# Patient Record
Sex: MALE | URBAN - METROPOLITAN AREA
[De-identification: names, ages, dates, MRNs, and addresses within clinical notes are randomized per-mention and may not be internally consistent; named-entity substitution may affect disease eponyms.]

---

## 2024-07-11 ENCOUNTER — TELEPHONE (OUTPATIENT)
Dept: TRANSPLANT | Facility: CLINIC | Age: 29
End: 2024-07-11

## 2024-07-15 ENCOUNTER — TELEPHONE (OUTPATIENT)
Dept: TRANSPLANT | Facility: CLINIC | Age: 29
End: 2024-07-15

## 2024-08-05 ENCOUNTER — TELEPHONE (OUTPATIENT)
Dept: TRANSPLANT | Facility: CLINIC | Age: 29
End: 2024-08-05
Payer: MEDICARE

## 2024-08-06 DIAGNOSIS — N18.6 END STAGE RENAL DISEASE: Primary | ICD-10-CM

## 2024-08-06 DIAGNOSIS — Z76.82 ORGAN TRANSPLANT CANDIDATE: ICD-10-CM

## 2024-08-15 ENCOUNTER — TELEPHONE (OUTPATIENT)
Dept: TRANSPLANT | Facility: CLINIC | Age: 29
End: 2024-08-15
Payer: MEDICAID

## 2024-08-20 ENCOUNTER — CLINICAL SUPPORT (OUTPATIENT)
Dept: INFECTIOUS DISEASES | Facility: CLINIC | Age: 29
End: 2024-08-20
Payer: MEDICARE

## 2024-08-20 ENCOUNTER — HOSPITAL ENCOUNTER (OUTPATIENT)
Dept: RADIOLOGY | Facility: HOSPITAL | Age: 29
Discharge: HOME | End: 2024-08-20
Attending: NURSE PRACTITIONER
Payer: MEDICARE

## 2024-08-20 ENCOUNTER — OFFICE VISIT (OUTPATIENT)
Dept: TRANSPLANT | Facility: CLINIC | Age: 29
End: 2024-08-20
Payer: MEDICARE

## 2024-08-20 ENCOUNTER — TELEPHONE (OUTPATIENT)
Dept: TRANSPLANT | Facility: CLINIC | Age: 29
End: 2024-08-20
Payer: MEDICARE

## 2024-08-20 VITALS
TEMPERATURE: 97 F | HEART RATE: 75 BPM | DIASTOLIC BLOOD PRESSURE: 104 MMHG | OXYGEN SATURATION: 99 % | WEIGHT: 164 LBS | HEIGHT: 71 IN | SYSTOLIC BLOOD PRESSURE: 158 MMHG | BODY MASS INDEX: 22.96 KG/M2 | RESPIRATION RATE: 18 BRPM

## 2024-08-20 DIAGNOSIS — Z01.818 PRE-TRANSPLANT EVALUATION FOR KIDNEY TRANSPLANT: Primary | ICD-10-CM

## 2024-08-20 DIAGNOSIS — K75.81 NASH (NONALCOHOLIC STEATOHEPATITIS): Primary | Chronic | ICD-10-CM

## 2024-08-20 DIAGNOSIS — N25.81 SECONDARY HYPERPARATHYROIDISM: ICD-10-CM

## 2024-08-20 DIAGNOSIS — K75.81 NASH (NONALCOHOLIC STEATOHEPATITIS): Chronic | ICD-10-CM

## 2024-08-20 DIAGNOSIS — I10 ESSENTIAL HYPERTENSION: Chronic | ICD-10-CM

## 2024-08-20 DIAGNOSIS — N18.6 ESRD ON PERITONEAL DIALYSIS: ICD-10-CM

## 2024-08-20 DIAGNOSIS — Z76.82 ORGAN TRANSPLANT CANDIDATE: ICD-10-CM

## 2024-08-20 DIAGNOSIS — Z99.2 ESRD ON PERITONEAL DIALYSIS: ICD-10-CM

## 2024-08-20 PROBLEM — E55.9 VITAMIN D DEFICIENCY: Status: ACTIVE | Noted: 2023-12-31

## 2024-08-20 PROBLEM — N18.9 ANEMIA IN CHRONIC KIDNEY DISEASE: Status: ACTIVE | Noted: 2024-06-14

## 2024-08-20 PROBLEM — D63.1 ANEMIA IN CHRONIC KIDNEY DISEASE: Status: ACTIVE | Noted: 2024-06-14

## 2024-08-20 PROCEDURE — 99999 PR PBB SHADOW E&M-EST. PATIENT-LVL IV: CPT | Mod: PBBFAC,TXP,, | Performed by: NURSE PRACTITIONER

## 2024-08-20 PROCEDURE — 90632 HEPA VACCINE ADULT IM: CPT | Mod: S$GLB,TXP,, | Performed by: INTERNAL MEDICINE

## 2024-08-20 PROCEDURE — 76770 US EXAM ABDO BACK WALL COMP: CPT | Mod: TC,TXP

## 2024-08-20 PROCEDURE — 71046 X-RAY EXAM CHEST 2 VIEWS: CPT | Mod: 26,TXP,, | Performed by: RADIOLOGY

## 2024-08-20 PROCEDURE — 90471 IMMUNIZATION ADMIN: CPT | Mod: S$GLB,TXP,, | Performed by: INTERNAL MEDICINE

## 2024-08-20 PROCEDURE — 71046 X-RAY EXAM CHEST 2 VIEWS: CPT | Mod: TC,TXP

## 2024-08-20 PROCEDURE — 99203 OFFICE O/P NEW LOW 30 MIN: CPT | Mod: 25,S$GLB,TXP, | Performed by: INTERNAL MEDICINE

## 2024-08-20 PROCEDURE — 76770 US EXAM ABDO BACK WALL COMP: CPT | Mod: 26,TXP,, | Performed by: RADIOLOGY

## 2024-08-20 PROCEDURE — 99203 OFFICE O/P NEW LOW 30 MIN: CPT | Mod: S$GLB,TXP,, | Performed by: TRANSPLANT SURGERY

## 2024-08-20 RX ORDER — LABETALOL 200 MG/1
400 TABLET, FILM COATED ORAL 2 TIMES DAILY
COMMUNITY

## 2024-08-20 RX ORDER — PANTOPRAZOLE SODIUM 40 MG/1
40 TABLET, DELAYED RELEASE ORAL DAILY
COMMUNITY

## 2024-08-20 RX ORDER — GENTAMICIN SULFATE 1 MG/G
1 CREAM TOPICAL DAILY
COMMUNITY

## 2024-08-20 RX ORDER — CALCIUM ACETATE 667 MG/1
1334 CAPSULE ORAL
COMMUNITY

## 2024-08-20 RX ORDER — CYCLOBENZAPRINE HCL 10 MG
10 TABLET ORAL 3 TIMES DAILY PRN
COMMUNITY

## 2024-08-20 RX ORDER — HYDRALAZINE HYDROCHLORIDE 25 MG/1
100 TABLET, FILM COATED ORAL EVERY 8 HOURS
COMMUNITY

## 2024-08-20 RX ORDER — NIFEDIPINE 90 MG/1
90 TABLET, EXTENDED RELEASE ORAL DAILY
COMMUNITY

## 2024-08-20 RX ORDER — FUROSEMIDE 80 MG/1
80 TABLET ORAL 2 TIMES DAILY
COMMUNITY

## 2024-08-20 RX ORDER — LOSARTAN POTASSIUM 50 MG/1
50 TABLET ORAL DAILY
COMMUNITY

## 2024-08-20 NOTE — PROGRESS NOTES
Transplant Nephrology  Kidney Transplant Recipient Evaluation    Referring Physician: Raj Florez  Current Nephrologist: Raj Florez    Subjective:   CC:  Initial evaluation of kidney transplant candidacy.    HPI:  Mr. Moreno is a 29 y.o. year old Black or  male who has presented to be evaluated as a potential kidney transplant recipient.  He has ESRD secondary to presumed HTN, no renal biopsy.  Initially started on HD 12/31/2023 for about 3 months before transitioning to home peritoneal dialysis. Currently doing cyclical PD, no peritonitis or exit site infections.  He has a PD catheter and permacath (planning for removal) for dialysis access.    Diagnosed with HTN around age 17. Hospitalized December 2022 with n/v and found to have hypertensive urgency and creatinine at the time was 2.6. No follow up after this until he presented again in December 2023 for similar complaints and found to have creatinine 9 with uremic symptoms. Ultimately initiated dialysis during this hospital stay. No renal biopsy, no family history of kidney disease. Does not recall ever having significant protein in his urine. He does believe he had a procedure done on his prostate in the past and was treated with antibiotics. Reports no problems passing urine.     Elevated AST/ALT on several labs, US abdomen notes mildly increased hepatic echogenicity and coarsened echotexture as seen with diffuse hepatic steatosis and/or chronic liver disease. He has never seen GI or hepatology.    He remains active. Looks great, not frail. Has several family members interested in living donation.    Denies MI, CVA, DVT/PE, or malignancy history.    Current Outpatient Medications   Medication Sig Dispense Refill    calcium acetate,phosphat bind, (PHOSLO) 667 mg capsule Take 1,334 mg by mouth 3 (three) times daily with meals.      cyclobenzaprine (FLEXERIL) 10 MG tablet Take 10 mg by mouth 3 (three) times daily as needed for Muscle  spasms.      furosemide (LASIX) 80 MG tablet Take 80 mg by mouth 2 (two) times daily.      gentamicin (GARAMYCIN) 0.1 % cream Apply 1 Application topically once daily.      hydrALAZINE (APRESOLINE) 25 MG tablet Take 100 mg by mouth every 8 (eight) hours.      labetaloL (NORMODYNE) 200 MG tablet Take 400 mg by mouth 2 (two) times daily.      losartan (COZAAR) 50 MG tablet Take 50 mg by mouth once daily.      NIFEdipine (ADALAT CC) 90 MG TbSR Take 90 mg by mouth once daily.      pantoprazole (PROTONIX) 40 MG tablet Take 40 mg by mouth once daily.       No current facility-administered medications for this visit.       Past Medical History:   Diagnosis Date    Anemia     Disorder of kidney and ureter     Enteritis     Hypertension     LVH (left ventricular hypertrophy)     ZAVALETA (nonalcoholic steatohepatitis)     Prostatitis     Secondary hyperparathyroidism        Past Medical and Surgical History: Mr. Moreno  has a past medical history of Anemia, Disorder of kidney and ureter, Enteritis, Hypertension, LVH (left ventricular hypertrophy), ZAVALETA (nonalcoholic steatohepatitis), Prostatitis, and Secondary hyperparathyroidism.  He has a past surgical history that includes Insertion of dialysis catheter and Peritoneal catheter insertion.    Past Social and Family History: Mr. Moreno reports that he has never smoked. He has never used smokeless tobacco. He reports that he does not currently use alcohol. He reports that he does not use drugs. His family history includes Heart disease in his sister; Hypertension in his maternal grandmother and mother.    Review of Systems   Constitutional:  Positive for fatigue. Negative for activity change and fever.   Eyes:  Negative for visual disturbance.   Respiratory:  Negative for cough and shortness of breath.    Cardiovascular:  Negative for chest pain and leg swelling.   Gastrointestinal:  Negative for abdominal pain, constipation, diarrhea and nausea.   Genitourinary:  Positive for  "decreased urine volume. Negative for difficulty urinating, frequency and hematuria.        Still urinating    Musculoskeletal:  Negative for arthralgias and myalgias.   Skin:  Negative for wound.   Neurological:  Negative for weakness.   Psychiatric/Behavioral:  Negative for sleep disturbance.        Objective:   Blood pressure (!) 158/104, pulse 75, temperature 96.8 °F (36 °C), temperature source Tympanic, resp. rate 18, height 5' 11.5" (1.816 m), weight 74.4 kg (164 lb 0.4 oz), SpO2 99%.body mass index is 22.56 kg/m².    Physical Exam  Vitals and nursing note reviewed.   Constitutional:       Appearance: Normal appearance.   Cardiovascular:      Rate and Rhythm: Normal rate and regular rhythm.      Heart sounds: Normal heart sounds.      Comments: permacath  Pulmonary:      Effort: Pulmonary effort is normal.      Breath sounds: Normal breath sounds.   Abdominal:      General: There is no distension.      Comments: PD catheter    Musculoskeletal:         General: Normal range of motion.   Skin:     General: Skin is warm and dry.   Neurological:      Mental Status: He is alert.         Labs:  Lab Results   Component Value Date    WBC 4.80 08/20/2024    HGB 10.1 (L) 08/20/2024    HCT 30.0 (L) 08/20/2024     08/20/2024    K 3.9 08/20/2024     08/20/2024    CO2 22 (L) 08/20/2024    BUN 68 (H) 08/20/2024    CREATININE 19.5 (H) 08/20/2024    EGFRNORACEVR 3.0 (A) 08/20/2024    CALCIUM 10.1 08/20/2024    PHOS 8.1 (H) 08/20/2024    ALBUMIN 3.6 08/20/2024    AST 32 08/20/2024    ALT 41 08/20/2024    .6 (H) 08/20/2024       Lab Results   Component Value Date    LIPASE 44 05/02/2024    PROTEINUA 3+ (A) 01/02/2024    WBCUA 0-5 (A) 01/02/2024       Labs were reviewed with the patient.    Assessment:     1. Pre-transplant evaluation for kidney transplant    2. ESRD on peritoneal dialysis    3. Essential hypertension    4. ZAVALETA (nonalcoholic steatohepatitis)    5. Secondary hyperparathyroidism    6. BMI " 22.0-22.9, adult      Plan:   29 y.o. year old  male who has presented to be evaluated as a potential kidney transplant recipient.  He has ESRD secondary to presumed HTN, no renal biopsy.  Initially started on HD 12/31/2023 for about 3 months before transitioning to home peritoneal dialysis. Will need afternoon imaging, stress test, and hepatology evaluation prior to selection. OK to start donor work up.      Transplant Candidacy:   Based on available information, Mr. Moreno is an excellent kidney transplant candidate.   Meets center eligibility for accepting HCV+ donor offer - Yes.  Patient educated on HCV+ donors. Jeffrey is willing to accept HCV+ donor offer - Yes   Patient is a candidate for KDPI > 85 kidney donor offer - No (age).  Final determination of transplant candidacy will be made once workup is complete and reviewed by the selection committee.    Patient advised that it is recommended that all transplant candidates, and their close contacts and household members receive Covid vaccination.    UNOS Patient Status  Functional Status: 90% - Able to carry on normal activity: minor symptoms of disease      Yola Brasher NP

## 2024-08-20 NOTE — PROGRESS NOTES
INITIAL PATIENT EDUCATION NOTE AA    Mr. Jeffrey Moreno was seen in pre-kidney transplant clinic for evaluation for kidney, kidney/pancreas or pancreas only transplant.  The patient attended an individual video education session that discussed/reviewed the following aspects of transplantation: evaluation including diagnostic and laboratory testing,(Chemistries, Hematology, Serologies including HIV and Hepatitis and HLA) required for transplantation and selection committee process, UNOS waitlist management/multiple listings, types of organs offered (KDPI < 85%, KDPI > 85%, PHS risk, DCD, HCV+, HIV+ for HIV+ recipients and enbloc/dual), financial aspects, surgical procedures, dietary instruction pre- and post-transplant, health maintenance pre- and post-transplant, post-transplant hospitalization and outpatient follow-up, potential to participate in a research protocol, and medication management and side effects.  A question and answer session was provided after the presentation.    The patient was seen by all members of the multi-disciplinary team to include: Nephrologist/RON, Surgeon, , Transplant Coordinator, , Pharmacist and Dietician (if applicable).    The patient reviewed and signed all consents for evaluation which were witnessed and sent to scanning into the [facility] chart.    The patient was given an education book and plan for further evaluation based on his individual assessment.      The Patient was educated on OPTN policy change regarding race based eGFR. For Black or  Americans, this eGFR could have shown that their kidneys were working better than they were.    Because of this change, we are looking at everyones record and assessing waiting time for people who are eligible. We will be reviewing your medical records and will notify you if you are eligible. We also encouraged patient to provide span 20 labs that are not in our electronic medical records    Reviewed  program requirement for complete COVID vaccination with documentation prior to listing.  COVID education information reviewed with patient. Patient encouraged to be up to date on all vaccinations.     The patient was informed that the transplant team would manage immediate post op pain. If the patient requires long term pain management, they will need to have that pain management addressed by their PCP or previous provider who wrote for long term pain medicines.    The patient was encouraged to call with any questions or concerns.

## 2024-08-20 NOTE — PROGRESS NOTES
PRE-TRANSPLANT INFECTIOUS DISEASE CONSULT    Reason for Visit:  Pre-transplant evaluation  Referring Provider: Dr. Raj Florez     History of Present Illness:    29 y.o. male with a history of HTN, ZAVALETA, ESRD on PD  presents for pre-kidney transplant evaluation.    Pt started PD about 2-3 months ago. Pt states he started on HD in Jan 2024. Pt has a chest catheter which is being removed on Thursday. Pt denies cf infection near PD cath site.     Infectious History:  Recent hospital admissions: Yes, pt recently diagnosis with Cdiff and various stomach infections (prior to PD cath being placed).   Recent infections: Yes, pt reports recent cdiff infection.   Recent or current antibiotic use: Yes, flagyl and cipro  History of recurrent infections *(sinus / pneumonia / UTI / SBP)*: no, though was diagnosised with pneumonia when he was treated inpatient for cdiff/enteritis   History of diabetic foot wound or bone/joint infection: No  Recent dental infections, issues or procedures: No  History of chicken pox: No  History of shingles: No  History of STI: No  History of COVID infection: Yes    History of Immunosuppression:  Prior chemotherapy / immunosuppression: No  Prior transplant: No  History of splenectomy: No    Tuberculosis:  Prior screening for latent TB: No  Prior diagnosis of latent TB: No  Risk factors for TB *known exposure, incarceration, homelessness*: No    Geographical exposures:  Currently lives in [place], [place] with mom  Lived in the following states: [place], [place]   Lived or travelled to the [facility] [place]: No  International travel: No  Travel-associated illness: No    Social/Environmental:  Occupation:  NA  Pets: Yes, dogs, cats (outdoor).   Livestock: Yes, horse.   Fishing / hunting: Yes, likes to fish   Hobbies: gardening, hiking   Water: [place]  Consumption of raw/undercooked meat or seafood?  No  Tobacco: No  Alcohol: No  Recreational drug use:  No  Sexual partners: yes, monogamous        Past Histories:   Past Medical History:   Diagnosis Date    Anemia     Disorder of kidney and ureter     Enteritis     Hypertension     LVH (left ventricular hypertrophy)     ZAVALETA (nonalcoholic steatohepatitis)     Secondary hyperparathyroidism      Past Surgical History:   Procedure Laterality Date    INSERTION OF DIALYSIS CATHETER      PERITONEAL CATHETER INSERTION       Family History   Problem Relation Name Age of Onset    Hypertension Mother      Heart disease Sister      Hypertension Maternal Grandmother       Social History     Tobacco Use    Smoking status: Never    Smokeless tobacco: Never   Substance Use Topics    Alcohol use: Not Currently    Drug use: Never     Review of patient's allergies indicates:  No Known Allergies      Current antibiotics:  Antibiotics (From admission, onward)      None              Review of Systems  Review of Systems   Constitutional: Negative for chills, fever, night sweats, weight gain and weight loss.   Respiratory:  Negative for cough and hemoptysis.    Skin:  Negative for poor wound healing, rash and suspicious lesions.   Gastrointestinal:  Negative for diarrhea, nausea and vomiting.   Genitourinary:  Negative for dysuria.          Objective  Physical Exam  Vitals reviewed.   Constitutional:       General: He is not in acute distress.     Appearance: Normal appearance. He is not ill-appearing.   HENT:      Head: Normocephalic.      Nose: Nose normal.      Mouth/Throat:      Mouth: Mucous membranes are moist.      Pharynx: Oropharynx is clear.   Eyes:      Conjunctiva/sclera: Conjunctivae normal.   Cardiovascular:      Rate and Rhythm: Normal rate.   Pulmonary:      Effort: Pulmonary effort is normal. No respiratory distress.      Breath sounds: No stridor.   Abdominal:      General: Abdomen is flat. There is no distension.      Palpations: Abdomen is soft.   Musculoskeletal:         General: Normal range of motion.      Cervical back: Normal range of motion.       "Right lower leg: No edema.      Left lower leg: No edema.   Skin:     Findings: No erythema or rash.   Neurological:      General: No focal deficit present.      Mental Status: He is alert and oriented to person, place, and time.      Motor: No weakness.      Gait: Gait normal.   Psychiatric:         Mood and Affect: Mood normal.         Behavior: Behavior normal.           Labs:    CBC:   Lab Results   Component Value Date    WBC 4.80 08/20/2024    HGB 10.1 (L) 08/20/2024    HCT 30.0 (L) 08/20/2024    MCV 87 08/20/2024     08/20/2024    GRAN 2.9 08/20/2024    GRAN 61.0 08/20/2024    LYMPH 1.4 08/20/2024    LYMPH 28.8 08/20/2024    MONO 0.3 08/20/2024    MONO 6.3 08/20/2024    EOSINOPHIL 2.7 08/20/2024       Syphilis screening:   Lab Results   Component Value Date    TREPABIGMIGG Nonreactive 08/20/2024        TB screening: No results found for: "TBGOLDPLUS", "TSPOTSCREN"    HIV screening:   Lab Results   Component Value Date    [id number] Non-reactive 08/20/2024       Strongyloides IgG: No results found for: "STRONGANTIGG"    Hepatitis Serologies:   Lab Results   Component Value Date    HEPAIGG Non-reactive 08/20/2024    HEPBCAB Non-reactive 08/20/2024    HEPBSAB >1000.00 08/20/2024    HEPBSAB Reactive 08/20/2024    HEPCAB Non-reactive 08/20/2024        Varicella IgG: No results found for: "VARICELLAINT"        There is no immunization history on file for this patient.     Immunization History:  Received all childhood vaccines: Yes  All household members receive annual flu vaccine: No  All household members are up to date on COVID vaccine: No    Assessment and Plan    1. Risks of Infection: Available serologies were reviewed. No unusual risks of infection or significant barriers to transplantation were identified from the infectious disease standpoint given the information available at this time.    - Acute infectious issues: None   - Pending serologies: Strongyloides IgG and VZV IgG   - Please call if any " pending serologic testing is positive.    2. Immunizations:  Based on the patient's immunization history and serologies, the following immunizations are recommended:  - Hepatitis A    Patient does not have immunity to hepatitis A    Vaccination ordered today: Yes   - Hepatitis B    Patient does have immunity to hepatitis B    Vaccination ordered today: No. Reason for not ordering: Immunity   - COVID    Current [facility] vaccination recommendations were discussed with the patient   - Annual high dose influenza     Vaccination ordered today: Yes   - Prevnar 20    Vaccination ordered today: Yes   - Tdap    Vaccination ordered today: Yes   - Shingrix    Vaccination ordered today: Yes    Recommended Pre-Transplant Immunization Schedule   Vaccine  0m 1m 2m 6m   Pneumococcal conjugate vaccine (Prevnar 20) X      Tetanus-diphtheria-pertussis (Tdap)* X      Hepatitis A Vaccine (Havrix)** X   X   Hepatitis B Vaccine (Heplisav)** X X     Influenza (annual) X      Zoster Recombinant Vaccine (Shingrix) X  X           *Administer booster every 10 years.       **Administer if no immunity demonstrated on serologies               Patient will receive vaccines today in ID clinic, and will be scheduled for all subsequent doses to be given in ID clinic.    3. Counseling:   I discussed with the patient the risk for increased susceptibility to infections following transplantation including increased risk for infection right after transplant and if rejection should occur.  The patient has been counseled on the importance of vaccinations to decrease risk of infection and severe illness. Specific guidance has been provided to the patient regarding the patient's occupation, hobbies and activities to avoid future infectious complications.     4. Transplant Candidacy: Based on available information, there are no identified significant barriers to transplantation from an infectious disease standpoint.  Final determination of transplant candidacy will  be made once evaluation is complete and reviewed by the Selection Committee.      Follow up with infectious disease as needed.       The total time for evaluation and management services performed on 08/20/2024 was greater than 30 minutes.

## 2024-08-20 NOTE — TELEPHONE ENCOUNTER
Reviewed pt transplant labs.  Notified dialysis unit dietitian of the following abnormal labs via fax and requested their most recent nutrition note on this pt.  Once this note is received it will be scanned into pt's chart.     Phos 8.1

## 2024-08-20 NOTE — PROGRESS NOTES
Transplant Surgery  Kidney Transplant Recipient Evaluation    Referring Physician: Raj Florez  Current Nephrologist: Raj Florez    Subjective:     Reason for Visit: evaluate transplant candidacy    History of Present Illness: Jeffrey Moreno is a 29 y.o. year old male undergoing transplant evaluation.    Dialysis History: Jeffrey is on peritoneal dialysis.      Transplant History: N/A    Etiology of Renal Disease: Hypertensive Nephrosclerosis (based on medical records from referral).    External provider notes reviewed: Yes    Review of Systems  Objective:     Physical Exam:  Constitutional:   Vitals reviewed: yes   Well-nourished and well-groomed: yes  Eyes:   Sclerae icteric: no   Extraocular movements intact: yes  GI:    Bowel sounds normal: yes   Tenderness: no    If yes, quadrant/location: not applicable   Palpable masses: no    If yes, quadrant/location: not applicable   Hepatosplenomegaly: no   Ascites: no   Hernia: no    If yes, type/location: not applicable   Surgical scars: yes    If yes, type/location: PD catheter  Resp:   Effort normal: yes   Breath sounds normal: yes    CV:   Regular rate and rhythm: yes   Heart sounds normal: yes   Femoral pulses normal: yes   Extremities edematous: no  Skin:   Rashes or lesions present: no    If yes, describe:not applicable   Jaundice:: no    Musculoskeletal:   Gait normal: yes   Strength normal: yes  Psych:   Oriented to person, place, and time: yes   Affect and mood normal: yes    Additional comments: not applicable    Diagnostics:  The following labs have been reviewed: NA  The following radiology images have been independently reviewed and interpreted: NA    Counseling: We provided Jeffrey Moreno with a group education session today.  We discussed kidney transplantation at length with him, including risks, potential complications, and alternatives in the management of his renal failure.  The discussion included complications related to anesthesia, bleeding,  infection, primary nonfunction, and ATN.  I discussed the typical postoperative course, length of hospitalization, the need for long-term immunosuppression, and the need for long-term routine follow-up.  I discussed living-donor and -donor transplantation and the relative advantages and disadvantages of each.  I also discussed average waiting times for both living donation and  donation.  I discussed national and center-specific survival rates.  I also mentioned the potential benefit of multicenter listing to candidates listed with centers within more than one organ procurement organization.  All questions were answered.    Patient advised that it is recommended that all transplant candidates, and their close contacts and household members receive Covid vaccination.    Final determination of transplant candidacy will be made once evaluation is complete and reviewed by the Kidney & Kidney/Pancreas Selection Committee.    Coronavirus disease (COVID-19) caused by severe acute respiratory virus coronavirus 2 (SARS-C0V 2) is associated with increased mortality in solid organ transplant recipients (SOT) compared to non-transplant patients. Vaccine responses to vaccination are depressed against SARS-CoV2 compared to normal individuals but improve with third vaccination doses. Vaccination prior to SOT provides both the best opportunity for transplant candidates to develop protective immunity and to reduce the risk of serious COVID19 infections post transplantation. Organ transplant candidates at [facility] will be required to receive SARS-CoV-2 vaccination prior to being listed with a an active status, whenever possible. Exceptions will be made for disability related reasons or for sincerely held Church beliefs.          Transplant Surgery - Candidacy   Assessment/Plan:   Jeffrey Moreno has end stage renal disease (ESRD) on dialysis. I see no surgical contraindication  to placing a kidney transplant. Based on available information, Jeffrey Moreno is a suitable kidney transplant candidate.     Additional testing to be completed according to the Written Order Guidelines for Adult Pre-kidney and Pancreas Transplant Evaluation (KI-02).  Interpretation of tests and discussion of patient management involves all members of the multidisciplinary transplant team.    Julius Hutchison MD

## 2024-08-20 NOTE — PROGRESS NOTES
PHARM.D. PRE-TRANSPLANT NOTE:    This patient's medication therapy was evaluated as part of his pre-transplant evaluation.      The following general pharmacologic concerns were noted: None    The following concerns for post-operative pain management were noted: Patient currently on cyclobenzaprine    The following pharmacologic concerns related to HCV therapy were noted: None      This patient's medication profile was reviewed for considerations for DAA Hepatitis C therapy:    [X]  No current inducers of CYP 3A4 or PGP  [X]  No amiodarone on this patient's EMR profile in the last 24 months  [X]  No past or current atrial fibrillation on this patient's EMR profile       Current Outpatient Medications   Medication Sig Dispense Refill    calcium acetate,phosphat bind, (PHOSLO) 667 mg capsule Take 1,334 mg by mouth 3 (three) times daily with meals.      cyclobenzaprine (FLEXERIL) 10 MG tablet Take 10 mg by mouth 3 (three) times daily as needed for Muscle spasms.      furosemide (LASIX) 80 MG tablet Take 80 mg by mouth 2 (two) times daily.      gentamicin (GARAMYCIN) 0.1 % cream Apply 1 Application topically once daily.      hydrALAZINE (APRESOLINE) 25 MG tablet Take 100 mg by mouth every 8 (eight) hours.      labetaloL (NORMODYNE) 200 MG tablet Take 400 mg by mouth 2 (two) times daily.      losartan (COZAAR) 50 MG tablet Take 50 mg by mouth once daily.      NIFEdipine (ADALAT CC) 90 MG TbSR Take 90 mg by mouth once daily.      pantoprazole (PROTONIX) 40 MG tablet Take 40 mg by mouth once daily.       No current facility-administered medications for this visit.           I am available for consultation and can be contacted, as needed by the other members of the Transplant team.

## 2024-08-20 NOTE — LETTER
August 21, 2024        Raj Florez  [address] [place] [address]  Phone: [phone]  Fax: [phone]             [facility]- Trans[facility] Fl  [address]  [facility] [address]  Phone: [phone]   Patient: Jeffrey Moreno   MR Number: [id number]   YOB: 1995   Date of Visit: 8/20/2024       Dear Dr. Raj Florez    Thank you for referring Jeffrey Moreno to me for evaluation. Attached you will find relevant portions of my assessment and plan of care.    If you have questions, please do not hesitate to call me. I look forward to following Jeffrey Moreno along with you.    Sincerely,    Yola Brasher NP    Enclosure    If you would like to receive this communication electronically, please contact [email or web address] or [phone] to request LIA Link access.    LIA Link is a tool which provides read-only access to select patient information with whom you have a relationship. Its easy to use and provides real time access to review your patients record including encounter summaries, notes, results, and demographic information.    If you feel you have received this communication in error or would no longer like to receive these types of communications, please e-mail [email or web address]

## 2024-08-26 NOTE — PROGRESS NOTES
Transplant Recipient Adult Psychosocial Assessment    Jeffrey Moreno  [address]  [place] [address]  Telephone Information:   Mobile [phone]   Home  [phone] (home)  Work  There is no work phone number on file.  E-mail  [email or web address]    Sex: male  YOB: 1995  Age: 29 y.o.    Encounter Date: 8/20/2024  U[place] Citizen: yes  Primary Language: English   Needed: no    Emergency Contact:  Name: Sarah Duffy  Relationship: mother  Address: same as pt   Phone Numbers: [phone] (mobile)    Family/Social Support:   Number of dependents/: Pt reports no minor dependents   Marital history: Pt reports no marital history  Other family dynamics: Pt presents with highly supportive mother Sarah. Pt resides with mother. Pt reports father is alive but they are currently estranged. Pt also reports having a highly supportive sister,girlfriend,friends and grandparents.     Household Composition:  Name: Sarah Duffy  Relationship: mother  Transportation: drives own vehicle   Phone Numbers: [phone] (mobile)    Do you and your caregivers have access to reliable transportation? yes  PRIMARY CAREGIVER: Sarah Duffy will be primary caregiver, phone number [phone].      provided in-depth information to patient and caregiver regarding pre- and post-transplant caregiver role.   strongly encourages patient and caregiver to have concrete plan regarding post-transplant care giving, including back-up caregiver(s) to ensure care giving needs are met as needed.    Patient and Caregiver states understanding all aspects of caregiver role/commitment and is able/willing/committed to being caregiver to the fullest extent necessary.    Patient and Caregiver verbalizes understanding of the education provided today and caregiver responsibilities.         remains available. Patient and Caregiver agree to contact  in a timely manner  if concerns arise.      Able to take time off work without financial concerns: yes.     Additional Significant Others who will Assist with Transplant:  Name: Dunia Duffy  Age: 70  Number: [phone]  City: [place] State: [place]  Relationship: grandmother  Does person drive? yes    Name: Brittanie  Age: 23  Number: [phone]  City: [place] State: [place]  Relationship: sister  Does person drive? yes    Living Will: no  Healthcare Power of : no Pt reports trusting mother with medical decisions as needed  Advance Directives on file: <<no information> per medical record.  Verbally reviewed LW/HCPA information.   provided patient with copy of LW/HCPA documents and provided education on completion of forms.    Living Donors: Yes.  Name: Nohemi, pt's sister expressed interest . Education and resource information given to patient.    Highest Education Level: High School (9-12) or GED  Reading Ability: 12th grade  Reports difficulty with: N/A  Learns Best By:  A combination of verbal, written, and hands-on instruction.       Status: no  VA Benefits: no     Working for Income: No  If no, reason not working: Disability  Patient is disabled.  Prior to disability, patient  was unemployed..    Spouse/Significant Other Employment: Pt's zoey Abernathy is also not employed    Disabled: yes: date disability began: , due to: ESRD.    Monthly Income:  Salary/Wages: $800  Food [place]: $96  Able to afford all costs now and if transplanted, including medications: yes  Patient and Caregiver verbalizes understanding of personal responsibilities related to transplant costs and the importance of having a financial plan to ensure that patients transplant costs are fully covered.       provided fundraising information/education. Patient and Caregiververbalizes understanding.   remains available.    Insurance:   Payer/Plan Subscr  Sex Relation Sub. Ins. ID Effective Group Num   1. [employer] MANAGE*  DARLINE SEWELL 1995 Male Self [id number] 5/1/24 [id number]                                   [address] [address]   2. [place] JOSE[place]* DARLINE SEWELL 1995 Male Self [id number] 7/1/24                                    [address] [address]     Primary Insurance (for UNOS reporting): Public Insurance - Medicare FFS (Fee For Service)  Secondary Insurance (for UNOS reporting): Public Insurance - Medicaid  Patient and Caregiver verbalizes clear understanding that patient may experience difficulty obtaining and/or be denied insurance coverage post-surgery. This includes and is not limited to disability insurance, life insurance, health insurance, burial insurance, long term care insurance, and other insurances.      Patient and Caregiver also reports understanding that future health concerns related to or unrelated to transplantation may not be covered by patient's insurance.  Resources and information provided and reviewed.     Patient and Caregiver provides verbal permission to release any necessary information to outside resources for patient care and discharge planning.  Resources and information provided are reviewed.      Dialysis Adherence: Patient reports as highly complaint with all PD recommendations. Adherence report requested.      Infusion Service: patient utilizing? no  Home Health: patient utilizing? no  DME: yes PD equipment   Pulmonary/Cardiac Rehab: pt denies   ADLS:  Pt reports pt is independent with all ADLS including driving, bathing,walking,taking medications, cooking, housekeeping, eating, and shopping.      Adherence:  Adherence education and counseling provided.     Per History Section:  Past Medical History:   Diagnosis Date    Anemia     Disorder of kidney and ureter     Enteritis     Hypertension     LVH (left ventricular hypertrophy)     ZAVALETA (nonalcoholic steatohepatitis)     Prostatitis     Secondary hyperparathyroidism      Social History     Tobacco Use    Smoking status: Never    Smokeless  tobacco: Never   Substance Use Topics    Alcohol use: Not Currently     Social History     Substance and Sexual Activity   Drug Use Never     Social History     Substance and Sexual Activity   Sexual Activity Not Currently       Per Today's Psychosocial:  Tobacco: none, patient denies any use.  Alcohol: none, patient denies any use.  Illicit Drugs/Non-prescribed Medications: none, patient denies any use.    Patient and Caregiver states clear understanding of the potential impact of substance use as it relates to transplant candidacy and is aware of possible random substance screening.  Substance abstinence/cessation counseling, education and resources provided and reviewed.     Arrests/DWI/Treatment/Rehab: patient denies    Psychiatric History:    Mental Health: Pt denies history of anxiety, depression and or overwhelming feelings of sadness at this time or in the past.   Psychiatrist/Counselor: Pt denies currently or in the past and reports willingness to meet with psychiatry if required by transplant.   Medications:  Pt denies utilizing mental health medications currently or in the past  Suicide/Homicide Issues: Pt denies feelings of wanting to harm self or other currently or in the past  Safety at home: Pt reports feeling safe at home.     Knowledge: Patient and Caregiver states having clear understanding and realistic expectations regarding the potential risks and potential benefits of organ transplantation and organ donation and agrees to discuss with health care team members and support system members, as well as to utilize available resources and express questions and/or concerns in order to further facilitate the pt informed decision-making.  Resources and information provided and reviewed.    Patient and Caregiver is aware of [facility]'s affiliation and/or partnership with agencies in home health care, LTAC, SNF, [facility], and other hospitals and clinics.    Understanding: Patient and Caregiver reports having a  clear understanding of the many lifetime commitments involved with being a transplant recipient, including costs, compliance, medications, lab work, procedures, appointments, concrete and financial planning, preparedness, timely and appropriate communication of concerns, abstinence (ETOH, tobacco, illicit non-prescribed drugs), adherence to all health care team recommendations, support system and caregiver involvement, appropriate and timely resource utilization and follow-through, mental health counseling as needed/recommended, and patient and caregiver responsibilities.  Social Service Handbook, resources and detailed educational information provided and reviewed.  Educational information provided.    Patient and Caregiver also reports current and expected compliance with health care regime and states having a clear understanding of the importance of compliance.      Patient and Caregiver reports a clear understanding that risks and benefits may be involved with organ transplantation and with organ donation.       Patient and Caregiver also reports clear understanding that psychosocial risk factors may affect patient, and include but are not limited to feelings of depression, generalized anxiety, anxiety regarding dependence on others, post traumatic stress disorder, feelings of guilt and other emotional and/or mental concerns, and/or exacerbation of existing mental health concerns.  Detailed resources provided and discussed.      Patient and Caregiver agrees to access appropriate resources in a timely manner as needed and/or as recommended, and to communicate concerns appropriately.  Patient and Caregiver also reports a clear understanding of treatment options available.     Patient and Caregiver received education in a group setting.   reviewed education, provided additional information, and answered questions.    Feelings or Concerns: Pt denies having any concerns and or overwhelming feelings  regarding transplant at this time.       Coping: Identify Patient & Caregiver Strategies to [place]:   1. In the past, coping with major surgery and/or related stress - enjoys his horse, dogs, cats,dahlia and family    2. Currently & Pre-transplant - enjoys his horse, dogs, cats and family    3. At the time of surgery - enjoys family and dahlia   4. During post-Transplant & Recovery Period - enjoys his horse, dogs, cats,dahlia and family     Goals: Pt reports post transplant goal of returning to work. Patient referred to Vocational Rehabilitation.    Interview Behavior: Patient and Caregiver presents as alert and oriented x 4, pleasant, good eye contact, well groomed, recall good, concentration/judgement good, average intelligence, calm, communicative, cooperative, and asking and answering questions appropriately. Pt and mother were highly engaged and motivated for transplant.         Transplant Social Work - Candidacy  Assessment/Plan:     Psychosocial Suitability: Patient presents as an acceptable candidate for transplant at this time. Based on psychosocial risk factors, patient presents as low risk, due to absence of overwhelming psychosocial concerns .    Recommendations/Additional Comments: SW recommends that pt conduct fundraising to assist pt with pay for cost of medications, food, gas, and other transplant related needs. SW recommends that pt remain aware of potential mental health concerns and contact the team if any concerns arise. SW recommends that pt remain abstinent from tobacco, ETOH, and drug use. SW supports pt's continued dialysis adherence. SW remains available to answer any questions or concerns that arise as the pt moves through the transplant process.     Deepti Wise, JUAN, LMSW

## 2024-08-28 ENCOUNTER — TELEPHONE (OUTPATIENT)
Dept: TRANSPLANT | Facility: CLINIC | Age: 29
End: 2024-08-28
Payer: MEDICARE

## 2024-08-28 NOTE — TELEPHONE ENCOUNTER
MA notes per Adherence form     FOR THE PAST THREE MONTHS:    0-AMA's  0-No-shows    No concerns with care giving, transportation, or mental health    Scanned in pt's media    Malorie Morgan  Abdominal Transplant MA

## 2024-08-29 ENCOUNTER — TELEPHONE (OUTPATIENT)
Dept: TRANSPLANT | Facility: CLINIC | Age: 29
End: 2024-08-29
Payer: MEDICARE

## 2024-09-05 ENCOUNTER — DOCUMENTATION ONLY (OUTPATIENT)
Dept: TRANSPLANT | Facility: CLINIC | Age: 29
End: 2024-09-05
Payer: MEDICARE

## 2024-09-25 ENCOUNTER — TELEPHONE (OUTPATIENT)
Dept: TRANSPLANT | Facility: CLINIC | Age: 29
End: 2024-09-25
Payer: MEDICARE

## 2024-09-25 NOTE — TELEPHONE ENCOUNTER
Returned call to pt, he wants to schedule stress test locally in [place].  Confirmed that outside testing letter has been sent to his local nephrologist/pcp, instructed him to call their office and they will get the stress test scheduled for him.  Pt verbalized understanding and will call Dr Florez office.

## 2024-09-25 NOTE — TELEPHONE ENCOUNTER
----- Message from Thalia Mendiola sent at 9/25/2024 12:43 PM CDT -----  Regarding: Stress Test  Contact: [phone]  Good afternoon,     Pt called requesting to schedule a Stress Test.  Pt is requesting a call back from clinical staff, in hopes to be scheduled.    Thank you,   Renate Antunez Navigator   Patient unable to complete

## 2024-09-26 ENCOUNTER — TELEPHONE (OUTPATIENT)
Dept: TRANSPLANT | Facility: CLINIC | Age: 29
End: 2024-09-26
Payer: MEDICARE

## 2024-09-30 ENCOUNTER — EPISODE CHANGES (OUTPATIENT)
Dept: TRANSPLANT | Facility: CLINIC | Age: 29
End: 2024-09-30

## 2024-10-01 ENCOUNTER — TELEPHONE (OUTPATIENT)
Dept: TRANSPLANT | Facility: CLINIC | Age: 29
End: 2024-10-01
Payer: MEDICARE

## 2024-10-01 NOTE — TELEPHONE ENCOUNTER
Lab results faxed to number below as requested.  ----- Message from Melly Quevedo sent at 9/26/2024  4:58 PM CDT -----  Regarding: FW: Consult/Advisory  Contact: Isabeljessee @ [phone]    ----- Message -----  From: Anthony Padilla RN  Sent: 9/26/2024  10:46 AM CDT  To: [facility] Pre-Kidney Transplant Clinical  Subject: FW: Consult/Advisory                               ----- Message -----  From: Génesis Ybarra  Sent: 9/25/2024   3:07 PM CDT  To: [facility] Post-Kidney Transplant Non-Clinical  Subject: Consult/Advisory                                 Consult/Advisory     Name Of Caller:Juan     Contact Preference: [phone]     Nature of call: nurse at [facility] is calling to speak about labs (LST) results that's needed. Fax number [phone]

## 2024-10-01 NOTE — LETTER
2024             To Whom It May Concern:  [facility]   Fax number [phone]     We received a request from Renate requesting lab results for the patient below, attached are the most recent results that we have.      RE: Jeffrey Sewell   MRN: [id number]   SEX male   : 1995     Insurance:   Payer/Plan Subscr  Sex Relation Sub. Ins. ID Effective Group Num   1. [facility]* JEFFREY SEWELL 1995 Male Self [id number]                                   [address] [address]   2. [place] M* JEFFREY SEWELL 1995 Male Self [id number]                                    [address] [address]       Sincerely,       Olive Chiu RN  Clinical Transplant Coordinator  [facility]  [address] [place] [address]  [phone] office  [phone] fax

## 2024-11-13 ENCOUNTER — TELEPHONE (OUTPATIENT)
Dept: TRANSPLANT | Facility: CLINIC | Age: 29
End: 2024-11-13
Payer: MEDICARE

## 2024-11-13 NOTE — TELEPHONE ENCOUNTER
----- Message from Med Assistant Stevens sent at 11/13/2024 12:29 PM CST -----  Regarding: FW: Questions    ----- Message -----  From: Adalgisa Babb  Sent: 11/13/2024  12:13 PM CST  To: [facility] Pre-Kidney Transplant Non-Clinical  Subject: Questions                                              Name Of Caller:  Jeffrey      Contact Preference:  [phone]       Nature of call:   Pt would like to speak with Shakeel. He has some questions regarding his referral, tests, and appts with kidney txp.

## 2024-11-29 ENCOUNTER — TELEPHONE (OUTPATIENT)
Dept: TRANSPLANT | Facility: CLINIC | Age: 29
End: 2024-11-29
Payer: MEDICARE

## 2024-11-29 DIAGNOSIS — N18.6 ESRD (END STAGE RENAL DISEASE): ICD-10-CM

## 2024-11-29 DIAGNOSIS — K75.81 NASH (NONALCOHOLIC STEATOHEPATITIS): Primary | ICD-10-CM

## 2024-11-29 DIAGNOSIS — Z76.82 AWAITING ORGAN TRANSPLANT STATUS: ICD-10-CM

## 2024-11-29 NOTE — LETTER
This communication is flagged as high priority.      November 29, 2024    Jeffrey Moreno  [address]   [place] [address]         Fax:  [phone]    Patient: Jeffrey Moreno   MR Number: [id number]   YOB: 1995     A battery of tests must be done to determine if you are in suitable health to undergo a kidney transplant.  All  the recommended studies must be completed and received by the transplant team before you can be presented to the transplant selection committee. Once all your evaluation is complete the committee will then decide if you are a suitable transplant candidate.  The following studies need to be obtained at home:    __X___ Hepatology consult and clearance: ICD-10 code: N75.81, Z76.82 We are asking for hepatology clearance for transplant surgery and maximize your medical management.  We also need to note if there are special  management strategies that need to be used during your transplant event, especially since we routinely use IV fluids to help the new kidney function at its best.  Also, your doctor needs to know that the average wait for a kidney transplant can be as long as 3-5 years.  Thus, we not only ask for a preoperative clearance, but also optimal medical management for ZAVALETA (nonalcoholic steatohepatitis).      You and your doctor should feel free to contact us at any time, if there are questions or concerns about these tests or the transplant evaluation process.    Sincerely,    Coby Sargent MD  Medical Director, Kidney & Kidney/Pancreas Transplantation      [facility]  [address] [place] [address]  [phone]    Cc: Raj Florez MD

## 2024-11-29 NOTE — TELEPHONE ENCOUNTER
----- Message from Med Assistant Beckford sent at 11/13/2024  4:37 PM CST -----  Hey,      This patient needs a Hepatology clearance. They need a referral with supporting documentation in order for them to get him an appointment. Patient called the nurse on [address] way and this is what she said. Her fax number is [phone].        Thanks :)

## 2025-02-12 DIAGNOSIS — Z01.818 PRE-TRANSPLANT EVALUATION FOR KIDNEY TRANSPLANT: Primary | ICD-10-CM

## 2025-02-20 ENCOUNTER — TELEPHONE (OUTPATIENT)
Dept: PODIATRY | Facility: CLINIC | Age: 30
End: 2025-02-20
Payer: MEDICARE

## 2025-02-20 ENCOUNTER — CLINICAL SUPPORT (OUTPATIENT)
Dept: INFECTIOUS DISEASES | Facility: CLINIC | Age: 30
End: 2025-02-20
Payer: MEDICARE

## 2025-02-20 DIAGNOSIS — Z00.00 HEALTHCARE MAINTENANCE: Primary | ICD-10-CM

## 2025-02-20 NOTE — PROGRESS NOTES
Pt received Havrix IM to right deltoid, pt tolerated injection well and departed from clinic in [facility].

## 2025-02-20 NOTE — TELEPHONE ENCOUNTER
I called patient unable to schedule appt. Message was given to patient. The patients insurance has no benefits for services at this location. The patient will have to pay out of pocket for this visit. The patient can contact their insurance carrier to locate providers in their network. By proceeding I acknowledge that I have shared this information with the patient.

## 2025-02-28 ENCOUNTER — TELEPHONE (OUTPATIENT)
Dept: HEPATOLOGY | Facility: CLINIC | Age: 30
End: 2025-02-28
Payer: MEDICARE

## 2025-02-28 NOTE — TELEPHONE ENCOUNTER
An appointment has been scheduled on Thursday, April 3, 2025 at 10:30AM w/Soniya Kelley.  Patient confirmed.  A copy of the appointment has been mailed out.

## 2025-04-03 ENCOUNTER — TELEPHONE (OUTPATIENT)
Dept: HEPATOLOGY | Facility: CLINIC | Age: 30
End: 2025-04-03

## 2025-04-03 ENCOUNTER — PROCEDURE VISIT (OUTPATIENT)
Dept: HEPATOLOGY | Facility: CLINIC | Age: 30
End: 2025-04-03
Payer: MEDICARE

## 2025-04-03 ENCOUNTER — PATIENT MESSAGE (OUTPATIENT)
Dept: HEPATOLOGY | Facility: CLINIC | Age: 30
End: 2025-04-03

## 2025-04-03 ENCOUNTER — RESULTS FOLLOW-UP (OUTPATIENT)
Dept: HEPATOLOGY | Facility: CLINIC | Age: 30
End: 2025-04-03

## 2025-04-03 ENCOUNTER — LAB VISIT (OUTPATIENT)
Dept: LAB | Facility: HOSPITAL | Age: 30
End: 2025-04-03
Payer: MEDICARE

## 2025-04-03 ENCOUNTER — OFFICE VISIT (OUTPATIENT)
Dept: HEPATOLOGY | Facility: CLINIC | Age: 30
End: 2025-04-03
Payer: MEDICARE

## 2025-04-03 VITALS — WEIGHT: 171.31 LBS | BODY MASS INDEX: 23.98 KG/M2 | HEIGHT: 71 IN

## 2025-04-03 DIAGNOSIS — K76.0 FATTY LIVER: ICD-10-CM

## 2025-04-03 DIAGNOSIS — R93.2 ABNORMAL FINDINGS ON DIAGNOSTIC IMAGING OF LIVER: ICD-10-CM

## 2025-04-03 DIAGNOSIS — R74.8 ELEVATED LIVER ENZYMES: ICD-10-CM

## 2025-04-03 DIAGNOSIS — E83.00 LOW CERULOPLASMIN LEVEL: ICD-10-CM

## 2025-04-03 DIAGNOSIS — K76.0 FATTY LIVER: Primary | ICD-10-CM

## 2025-04-03 PROBLEM — E83.39 OTHER DISORDERS OF PHOSPHORUS METABOLISM: Status: ACTIVE | Noted: 2024-06-14

## 2025-04-03 PROBLEM — T78.40XA ALLERGY, UNSPECIFIED, INITIAL ENCOUNTER: Status: ACTIVE | Noted: 2024-06-14

## 2025-04-03 PROBLEM — E56.8 DEFICIENCY OF OTHER VITAMINS: Status: ACTIVE | Noted: 2023-12-31

## 2025-04-03 PROBLEM — R88.0 CLOUDY DIALYSIS EFFLUENT: Status: ACTIVE | Noted: 2024-06-14

## 2025-04-03 PROBLEM — R25.2 CRAMP AND SPASM: Status: ACTIVE | Noted: 2024-06-14

## 2025-04-03 PROBLEM — Z99.2 DEPENDENCE ON RENAL DIALYSIS: Status: ACTIVE | Noted: 2024-06-14

## 2025-04-03 PROBLEM — E46 UNSPECIFIED PROTEIN-CALORIE MALNUTRITION: Status: ACTIVE | Noted: 2024-06-14

## 2025-04-03 PROBLEM — E87.70 HYPERVOLEMIA: Status: ACTIVE | Noted: 2024-09-21

## 2025-04-03 PROBLEM — K52.9 ENTERITIS: Status: ACTIVE | Noted: 2024-04-29

## 2025-04-03 PROBLEM — T85.71XA: Status: ACTIVE | Noted: 2024-06-14

## 2025-04-03 PROBLEM — R51.9 HEADACHE, UNSPECIFIED: Status: ACTIVE | Noted: 2024-06-14

## 2025-04-03 PROBLEM — N17.9 ACUTE RENAL FAILURE: Status: ACTIVE | Noted: 2023-12-30

## 2025-04-03 PROBLEM — Z99.2 ESRD (END STAGE RENAL DISEASE) ON DIALYSIS: Chronic | Status: ACTIVE | Noted: 2024-04-29

## 2025-04-03 PROBLEM — D63.8 ANEMIA OF CHRONIC DISEASE: Status: ACTIVE | Noted: 2024-06-14

## 2025-04-03 PROBLEM — R11.2 NAUSEA WITH VOMITING, UNSPECIFIED: Status: ACTIVE | Noted: 2024-06-14

## 2025-04-03 PROBLEM — R07.9 CHEST PAIN, UNSPECIFIED: Status: ACTIVE | Noted: 2024-06-14

## 2025-04-03 PROBLEM — E83.49 OTHER DISORDERS OF MAGNESIUM METABOLISM: Status: ACTIVE | Noted: 2024-06-14

## 2025-04-03 PROBLEM — T78.2XXA ANAPHYLACTIC SHOCK, UNSPECIFIED, INITIAL ENCOUNTER: Status: ACTIVE | Noted: 2024-06-14

## 2025-04-03 PROBLEM — I11.0 LVH (LEFT VENTRICULAR HYPERTROPHY) DUE TO HYPERTENSIVE DISEASE, WITH HEART FAILURE: Chronic | Status: ACTIVE | Noted: 2024-05-07

## 2025-04-03 PROBLEM — N19 UREMIA: Status: ACTIVE | Noted: 2023-12-31

## 2025-04-03 PROBLEM — K29.70 GASTRITIS, UNSPECIFIED, WITHOUT BLEEDING: Status: ACTIVE | Noted: 2024-06-28

## 2025-04-03 PROBLEM — I10 SEVERE UNCONTROLLED HYPERTENSION: Status: ACTIVE | Noted: 2023-12-31

## 2025-04-03 PROBLEM — R50.9 FEVER, UNSPECIFIED: Status: ACTIVE | Noted: 2024-06-14

## 2025-04-03 PROBLEM — R06.02 SHORTNESS OF BREATH: Status: ACTIVE | Noted: 2024-06-14

## 2025-04-03 PROBLEM — I95.9 HYPOTENSION, UNSPECIFIED: Status: ACTIVE | Noted: 2024-06-14

## 2025-04-03 PROBLEM — I12.0 HYPERTENSIVE CHRONIC KIDNEY DISEASE WITH STAGE 5 CHRONIC KIDNEY DISEASE OR END STAGE RENAL DISEASE: Status: ACTIVE | Noted: 2024-06-14

## 2025-04-03 PROBLEM — N18.31 STAGE 3A CHRONIC KIDNEY DISEASE: Status: ACTIVE | Noted: 2023-12-31

## 2025-04-03 PROBLEM — L29.9 PRURITUS, UNSPECIFIED: Status: ACTIVE | Noted: 2024-06-14

## 2025-04-03 PROBLEM — D50.9 IRON DEFICIENCY ANEMIA, UNSPECIFIED: Status: ACTIVE | Noted: 2024-06-14

## 2025-04-03 PROBLEM — N18.6 ESRD (END STAGE RENAL DISEASE) ON DIALYSIS: Chronic | Status: ACTIVE | Noted: 2024-04-29

## 2025-04-03 PROBLEM — Z01.84 ENCOUNTER FOR ANTIBODY RESPONSE EXAMINATION: Status: ACTIVE | Noted: 2024-06-14

## 2025-04-03 PROBLEM — R10.9 INTRACTABLE ABDOMINAL PAIN: Status: ACTIVE | Noted: 2024-04-29

## 2025-04-03 PROBLEM — K52.9 NONINFECTIVE GASTROENTERITIS AND COLITIS, UNSPECIFIED: Status: ACTIVE | Noted: 2024-06-28

## 2025-04-03 PROBLEM — R19.7 DIARRHEA, UNSPECIFIED: Status: ACTIVE | Noted: 2024-06-14

## 2025-04-03 PROBLEM — R52 PAIN, UNSPECIFIED: Status: ACTIVE | Noted: 2024-06-14

## 2025-04-03 LAB — CERULOPLASMIN SERPL-MCNC: 19 MG/DL (ref 15–45)

## 2025-04-03 PROCEDURE — 36415 COLL VENOUS BLD VENIPUNCTURE: CPT | Mod: TXP

## 2025-04-03 PROCEDURE — 82390 ASSAY OF CERULOPLASMIN: CPT | Mod: TXP

## 2025-04-03 PROCEDURE — 99999 PR PBB SHADOW E&M-EST. PATIENT-LVL IV: CPT | Mod: PBBFAC,TXP,, | Performed by: NURSE PRACTITIONER

## 2025-04-03 RX ORDER — GABAPENTIN 100 MG/1
100 CAPSULE ORAL
COMMUNITY
Start: 2025-03-11 | End: 2026-03-11

## 2025-04-03 RX ORDER — FOLIC ACID 1 MG/1
1 TABLET ORAL
COMMUNITY
Start: 2024-12-12

## 2025-04-03 RX ORDER — FLUCONAZOLE 100 MG/1
TABLET ORAL
COMMUNITY
Start: 2024-11-11 | End: 2025-04-03 | Stop reason: ALTCHOICE

## 2025-04-03 RX ORDER — MINOXIDIL 2.5 MG/1
3 TABLET ORAL
COMMUNITY
Start: 2024-09-05

## 2025-04-03 NOTE — PROCEDURES
FibroScan Transplant Hepatology(Vibration Controlled Transient Elastography)    Date/Time: 4/3/2025 11:30 AM    Performed by: Soniya Kelley NP  Authorized by: Soniya Kelley NP    Diagnosis:  NAFLD    Probe:  M    Universal Protocol: Patient's identity, procedure and site were verified, confirmatory pause was performed.  Discussed procedure including risks and potential complications.  Questions answered.  Patient verbalizes understanding and wishes to proceed with VCTE.     Procedure: After providing explanations of the procedure, patient was placed in the supine position with right arm in maximum abduction to allow optimal exposure of right lateral abdomen.  Patient was briefly assessed, Testing was performed in the mid-axillary location, 50Hz Shear Wave pulses were applied and the resulting Shear Wave and Propagation Speed detected with a 3.5 MHz ultrasonic signal, using the FibroScan probe, Skin to liver capsule distance and liver parenchyma were accessed during the entire examination with the FibroScan probe, Patient was instructed to breathe normally and to abstain from sudden movements during the procedure, allowing for random measurements of liver stiffness. At least 10 Shear Waves were produced, Individual measurements of each Shear Wave were calculated.  Patient tolerated the procedure well with no complications.  Meets discharge criteria as was dismissed.  Rates pain 0 out of 10.  Patient will follow up with ordering provider to review results.    Findings  Median liver stiffness score:  5.7  CAP Reading: dB/m:  189    IQR/med %:  8  Interpretation  Fibrosis interpretation is based on medial liver stiffness - Kilopascal (kPa).    Fibrosis Stage:  F 0-1  Steatosis interpretation is based on controlled attenuation parameter - (dB/m).    Steatosis Grade:  <S1

## 2025-04-03 NOTE — PROGRESS NOTES
"[facility] New Patient Visit    Reason for Visit:  Fatty Liver    PCP: Ellyn, Primary Doctor    HPI:  This is a 29 y.o. male with PMH noted below, here for evaluation of fatty liver. PMH is significant for ESRD, on peritoneal dialysis. He is currently undergoing evaluation for kidney transplant.    The patient's risk factors for fatty liver disease include:     Overweight/Obesity                     No; BMI 23.89  Dyslipidemia                                No; Refer to most recent lipid panel results below:    Insulin resistance/Diabetes         No; Last HgbA1c was 4.4% (1/2025)    He has had intermittently elevated liver enzymes in a hepatocellular pattern since at least 12/2023, based on review of outside records.    Abdominal US in 10/2024 showed a small volume perihepatic free fluid. Prior abdominal US in 5/2024 showed "mildly increased hepatic echogenicity and coarsened echotexture as seen with diffuse hepatic steatosis and/or chronic liver disease. No evidence of advanced cirrhosis. No focal liver lesions."    Prior abdominal CT with contrast in 4/2024 showed a normal appearing liver and spleen. Platelet counts are normal.     A Fibroscan was done to non-invasively stage his liver disease at an outside facility in [place] in 12/2024, but the results are not available for review. Per patient report, he was told that the results did not suggest any significant fibrosis.     He has never undergone a liver biopsy.    FIB-4 Calculation: 0.67 at 8/20/2024  7:43 AM  Calculated from:  SGOT/AST: 32 U/L at 8/20/2024  7:43 AM  SGPT/ALT: 41 U/L at 8/20/2024  7:43 AM  Platelets: 209 K/uL at 8/20/2024  7:43 AM  Age: 29 years     FIB-4 below 1.30 is considered as low-risk for advanced fibrosis  FIB-4 over 2.67 is considered as high-risk for advanced fibrosis  FIB-4 values between 1.30 and 2.67 are considered as intermediate-risk of advanced fibrosis for ages 36-64.     For ages > 64 the cut-off for low-risk goes to < " 2.  This is a screening tool and clinical judgement should be used in the interpretation of these results.    Family history is significant for Mother with a history of fatty liver disease. He denies any history of heavy alcohol intake and does not use illicit drugs. HBV, HCV and HIV negative on prior labs. He is immune to Hepatitis B, through prior vaccination. He is S/P vaccination for HAV.     Serological work up was done per local GI provider to rule out other causes of chronic liver disease, and was negative for Alpha-1 antitrypsin deficiency, autoimmune etiology and iron overload. Of note, Ceruloplasmin was mildly low (17.3), but 24 hour urine for copper was normal.     He is well appearing, and has no signs or symptoms of hepatic decompensation including jaundice, dark urine, pruritus, abdominal distention, lower extremity edema, hematemesis, melena, or periods of confusion suggestive of hepatic encephalopathy.      PMHX:  has a past medical history of Anemia, Disorder of kidney and ureter, Enteritis, Hypertension, LVH (left ventricular hypertrophy), ZAVALETA (nonalcoholic steatohepatitis), Prostatitis, and Secondary hyperparathyroidism.    PSHX:  has a past surgical history that includes Insertion of dialysis catheter and Peritoneal catheter insertion.    The patient's social and family histories were reviewed by me and updated in the appropriate section of the electronic medical record.    Review of patient's allergies indicates:  No Known Allergies    Medications Ordered Prior to Encounter[1]    SOCIAL HISTORY:   Tobacco Use History[2]    Social History     Substance and Sexual Activity   Alcohol Use Not Currently     Social History     Substance and Sexual Activity   Drug Use Never     ROS:   GENERAL: Denies fever, chills, weight loss/gain, fatigue  HEENT: Denies headaches, dizziness, vision/hearing changes  CARDIOVASCULAR: Denies chest pain, palpitations, or edema  RESPIRATORY: Denies dyspnea, cough  GI:  "Denies abdominal pain, rectal bleeding, nausea, vomiting. No change in bowel pattern or color  : Denies dysuria, hematuria   SKIN: Denies rash, itching   NEURO: Denies confusion, memory loss, or mood changes  PSYCH: Denies depression or anxiety  HEME/LYMPH: Denies easy bruising or bleeding    Objective Findings:    PHYSICAL EXAM:   Friendly Black or  male, in no acute distress; alert and oriented to person, place and time  VITALS: Ht 5' 11" (1.803 m)   Wt 77.7 kg (171 lb 4.8 oz)   BMI 23.89 kg/m²   HENT: Normocephalic, without obvious abnormality.   EYES: Sclerae anicteric.   NECK: No obvious masses.  CARDIOVASCULAR: No peripheral edema.  RESPIRATORY: Normal respiratory effort.   GI: Non-distended abdomen.  EXTREMITIES:  No clubbing, cyanosis or edema.  SKIN: Warm and dry. No jaundice. No rashes noted to exposed skin.   NEURO: No asterixis.  PSYCH:  Memory intact. Thought and speech pattern appropriate. Behavior normal. No depression or anxiety noted.    DIAGNOSTIC STUDIES: Reviewed in Epic.     FIBROSCAN - Ordered at visit.    ASSESSMENT & PLAN:  29 y.o. Black or  male with:    1. Fatty liver  Ceruloplasmin    FibroScan Transplant Hepatology(Vibration Controlled Transient Elastography)    US Abdomen Complete      2. Abnormal findings on diagnostic imaging of liver  Ceruloplasmin    FibroScan Transplant Hepatology(Vibration Controlled Transient Elastography)    US Abdomen Complete      3. History of elevated liver enzymes  Ceruloplasmin    FibroScan Transplant Hepatology(Vibration Controlled Transient Elastography)    US Abdomen Complete      4. Low ceruloplasmin level  Ceruloplasmin    FibroScan Transplant Hepatology(Vibration Controlled Transient Elastography)    US Abdomen Complete          - Schedule Fibroscan to non-invasively stage liver disease.  - Repeat liver function tests every 6 months.  - Obtain repeat Ceruloplasmin level today.   - Maintain a healthy weight, through " diet and exercise.  - Recommend good control of cholesterol, blood pressure, & blood sugar levels.  - Avoid herbal supplements/alternative remedies.  - Limit alcohol use to no more than 5-7 standard drinks per week, ideally less.  - Continue follow up with local Nephrologist and Kidney transplant team, as planned.  - Return to clinic TBD by lab and Fibroscan results.     Thank you for allowing me to participate in the care of Jeffrey Moreno       Hepatology Nurse Practitioner  [facility]    CC'ed note to:   Yola Brasher, NP  No, Primary Doctor           [1]   Current Outpatient Medications on File Prior to Visit   Medication Sig Dispense Refill    fluconazole (DIFLUCAN) 100 MG tablet       folic acid (FOLVITE) 1 MG tablet Take 1 tablet by mouth.      gabapentin (NEURONTIN) 100 MG capsule Take 100 mg by mouth.      gentamicin (GARAMYCIN) 0.1 % cream Apply 1 Application topically once daily.      hydrALAZINE (APRESOLINE) 25 MG tablet Take 100 mg by mouth every 8 (eight) hours.      labetaloL (NORMODYNE) 200 MG tablet Take 400 mg by mouth 2 (two) times daily.      losartan (COZAAR) 50 MG tablet Take 50 mg by mouth once daily.      minoxidiL (LONITEN) 2.5 MG tablet Take 3 tablets by mouth.      calcium acetate,phosphat bind, (PHOSLO) 667 mg capsule Take 1,334 mg by mouth 3 (three) times daily with meals. (Patient not taking: Reported on 4/3/2025)      cyclobenzaprine (FLEXERIL) 10 MG tablet Take 10 mg by mouth 3 (three) times daily as needed for Muscle spasms. (Patient not taking: Reported on 4/3/2025)      furosemide (LASIX) 80 MG tablet Take 80 mg by mouth 2 (two) times daily. (Patient not taking: Reported on 4/3/2025)      NIFEdipine (ADALAT CC) 90 MG TbSR Take 90 mg by mouth once daily. (Patient not taking: Reported on 4/3/2025)      pantoprazole (PROTONIX) 40 MG tablet Take 40 mg by mouth once daily. (Patient not taking: Reported on 4/3/2025)       No current  facility-administered medications on file prior to visit.   [2]   Social History  Tobacco Use   Smoking Status Never   Smokeless Tobacco Never

## 2025-04-03 NOTE — TELEPHONE ENCOUNTER
----- Message from Soniya Kelley NP sent at 4/3/2025  2:17 PM CDT -----  Please contact patient's local GI provider (Genny Benitez NP) at [phone] to obtain a copy of Fibroscan results from 12/2024. Thanks!      ----- Message -----  From: Lab, Background User  Sent: 4/3/2025   1:54 PM CDT  To: Soniya Kelley NP

## 2025-04-04 ENCOUNTER — PATIENT MESSAGE (OUTPATIENT)
Dept: HEPATOLOGY | Facility: CLINIC | Age: 30
End: 2025-04-04
Payer: MEDICARE

## 2025-04-07 ENCOUNTER — HOSPITAL ENCOUNTER (OUTPATIENT)
Dept: RADIOLOGY | Facility: HOSPITAL | Age: 30
Discharge: HOME | End: 2025-04-07
Attending: NURSE PRACTITIONER
Payer: MEDICARE

## 2025-04-07 DIAGNOSIS — R93.2 ABNORMAL FINDINGS ON DIAGNOSTIC IMAGING OF LIVER: ICD-10-CM

## 2025-04-07 DIAGNOSIS — R74.8 ELEVATED LIVER ENZYMES: ICD-10-CM

## 2025-04-07 DIAGNOSIS — E83.00 LOW CERULOPLASMIN LEVEL: ICD-10-CM

## 2025-04-07 DIAGNOSIS — K76.0 FATTY LIVER: ICD-10-CM

## 2025-04-07 PROCEDURE — 76700 US EXAM ABDOM COMPLETE: CPT | Mod: TC,PO,TXP

## 2025-04-24 ENCOUNTER — EPISODE CHANGES (OUTPATIENT)
Dept: TRANSPLANT | Facility: CLINIC | Age: 30
End: 2025-04-24

## 2025-04-25 ENCOUNTER — TELEPHONE (OUTPATIENT)
Dept: TRANSPLANT | Facility: CLINIC | Age: 30
End: 2025-04-25
Payer: MEDICARE

## 2025-04-30 ENCOUNTER — TELEPHONE (OUTPATIENT)
Dept: TRANSPLANT | Facility: CLINIC | Age: 30
End: 2025-04-30
Payer: MEDICARE

## 2025-04-30 NOTE — TELEPHONE ENCOUNTER
MA notes per Adherence form     PD PT    FOR THE PAST THREE MONTHS:    0-AMA's  0-No-shows    No concerns with care giving, transportation, or mental health    Scanned in pt's media    Malorie Morgan  Abdominal Transplant MA

## 2025-04-30 NOTE — TELEPHONE ENCOUNTER
----- Message from Donnie Beckford sent at 4/29/2025  9:14 AM CDT -----  Regarding: FW: list status and next steps  Contact: Patient can be contacted @# [phone]    ----- Message -----  From: Muna Ruiz  Sent: 4/29/2025   9:04 AM CDT  To: [facility] Non-Clinical  Subject: list status and next steps                       PT called asking status on txp list and next steps Please reach out to advise PTPatient can be contacted @# [phone]

## 2025-05-02 ENCOUNTER — COMMITTEE REVIEW (OUTPATIENT)
Dept: TRANSPLANT | Facility: CLINIC | Age: 30
End: 2025-05-02
Payer: MEDICARE

## 2025-05-02 ENCOUNTER — EPISODE CHANGES (OUTPATIENT)
Dept: TRANSPLANT | Facility: HOSPITAL | Age: 30
End: 2025-05-02

## 2025-05-02 NOTE — COMMITTEE REVIEW
Native Organ Dx: Hypertensive Nephrosclerosis      SELECTION COMMITTEE NOTE    Jeffrey Moreno was presented at selection committee on 5/02/2025 .  Patient met selection criteria for kidney transplant related to CKD, Stage 5 due to    Hypertensive Nephrosclerosis.  No absolute contraindications to transplant at this time.  Patient will be placed on the cadaveric wait list pending final financial approval from insurance company.  Patient will return to clinic for routine appointment in 12 month(s). Patient does not meet criteria for High KDPI kidney offer. Patient meets HCV+ kidney offer. Patient meets criteria for dual/enbloc, due to Committee.  Planned immunosuppression Thymoglobulin.    The patient was discussed in Selection.  His history is consistent with APOL-1 associated nephropathy.  No further work-up was deemed necessary.    Spoke to pt, notified him that he has been approved for kidney transplant, will be activated on the kidney transplant waitlist when we receive the final insurance authorization.  Pt verbalized understanding, no further questions at this time.    Note written by Olive Chiu RN.    ===============================================    I was present at the meeting and attest to the general consensus of the committee.   Gelacio Tyler Jr.

## 2025-05-02 NOTE — LETTER
May 2, 2025    Raj Florez MD  [facility] [address] [place] [address]  Phone: [phone]  Fax: [phone]     Dear Dr. Florez:    Patient: Jeffrey Moreno   MR Number: [id number]   YOB: 1995     Your patient, Jfefrey Moreno, was recently discussed at the [facility] Selection Committee meeting on 5/02/2025 . I am happy to inform you that Jeffrey has been approved for transplantation.  He has met selection criteria for a kidney transplant related to CKD stage 5 secondary to primary diagnosis of Hypertensive Nephrosclerosis. Your patient will be placed on the cadaveric wait list pending final financial approval from insurance company.     We appreciate your confidence in allowing us to participate in your patients care.  If you have any questions or concerns, please do not hesitate to contact me.    Sincerely,      Coby Sargent MD  Medical Director, Kidney & Kidney/Pancreas Transplantation    CC:  Jeffrey Moreno (patient)           [facility]

## 2025-05-06 ENCOUNTER — TELEPHONE (OUTPATIENT)
Dept: TRANSPLANT | Facility: CLINIC | Age: 30
End: 2025-05-06
Payer: MEDICARE

## 2025-05-07 ENCOUNTER — TELEPHONE (OUTPATIENT)
Dept: TRANSPLANT | Facility: CLINIC | Age: 30
End: 2025-05-07
Payer: MEDICARE

## 2025-05-07 NOTE — TELEPHONE ENCOUNTER
Faxed and sent via email.----- Message from Donnie Beckford sent at 5/7/2025  2:53 PM CDT -----  Images pushed! Fill out a sheet and fax it.

## 2025-05-15 ENCOUNTER — TELEPHONE (OUTPATIENT)
Dept: TRANSPLANT | Facility: CLINIC | Age: 30
End: 2025-05-15
Payer: MEDICARE

## 2025-05-15 NOTE — TELEPHONE ENCOUNTER
MA notes per Adherence form     PD PT    FOR THE PAST THREE MONTHS:    0-AMA's  0-No-shows    No concerns with care giving, transportation, or mental health    PD PT  ADMIT DATE 6/17/24    Scanned in pt's media    Malorie Morgan  Abdominal Transplant MA

## 2025-06-05 ENCOUNTER — TELEPHONE (OUTPATIENT)
Dept: TRANSPLANT | Facility: CLINIC | Age: 30
End: 2025-06-05
Payer: MEDICARE

## 2025-06-05 DIAGNOSIS — N18.6 END STAGE RENAL DISEASE: ICD-10-CM

## 2025-06-05 DIAGNOSIS — Z76.82 ORGAN TRANSPLANT CANDIDATE: Primary | ICD-10-CM

## 2025-06-05 DIAGNOSIS — Z76.82 ORGAN TRANSPLANT CANDIDATE: ICD-10-CM

## 2025-06-05 DIAGNOSIS — N18.6 END STAGE RENAL DISEASE: Primary | ICD-10-CM

## 2025-06-07 ENCOUNTER — TELEPHONE (OUTPATIENT)
Dept: TRANSPLANT | Facility: CLINIC | Age: 30
End: 2025-06-07
Payer: MEDICARE

## 2025-06-07 ENCOUNTER — LAB VISIT (OUTPATIENT)
Dept: LAB | Facility: HOSPITAL | Age: 30
End: 2025-06-07
Payer: MEDICARE

## 2025-06-07 DIAGNOSIS — Z76.82 AWAITING ORGAN TRANSPLANT STATUS: ICD-10-CM

## 2025-06-07 DIAGNOSIS — Z76.82 AWAITING ORGAN TRANSPLANT STATUS: Primary | ICD-10-CM

## 2025-06-07 PROCEDURE — 86826 HLA X-MATCH NONCYTOTOXC ADDL: CPT | Performed by: TRANSPLANT SURGERY

## 2025-06-07 PROCEDURE — 36415 COLL VENOUS BLD VENIPUNCTURE: CPT | Mod: TXP

## 2025-06-07 PROCEDURE — 86825 HLA X-MATH NON-CYTOTOXIC: CPT | Performed by: TRANSPLANT SURGERY

## 2025-06-07 PROCEDURE — 86977 RBC SERUM PRETX INCUBJ/INHIB: CPT | Performed by: TRANSPLANT SURGERY

## 2025-06-07 PROCEDURE — 86832 HLA CLASS I HIGH DEFIN QUAL: CPT | Performed by: TRANSPLANT SURGERY

## 2025-06-07 PROCEDURE — 86826 HLA X-MATCH NONCYTOTOXC ADDL: CPT | Mod: 91 | Performed by: TRANSPLANT SURGERY

## 2025-06-07 PROCEDURE — 86825 HLA X-MATH NON-CYTOTOXIC: CPT | Mod: 91 | Performed by: TRANSPLANT SURGERY

## 2025-06-07 PROCEDURE — 86833 HLA CLASS II HIGH DEFIN QUAL: CPT | Performed by: TRANSPLANT SURGERY

## 2025-06-07 NOTE — TELEPHONE ENCOUNTER
ON CALL NOTE    UNOS # [id number]    0700:  Assumed care from Ruchi SANCHEZ Report received.  [facility] and notified that a prospective [facility]match needs to be [facility].  Patient will arrive to the ED to provide a sample today.  Spoke with Sybil BEE ED charge nurse that patient will arrive today to provide an HLA sample and to contact Raj with [facility] for pickup.      Received message from TONY Sheehan in ED that sample was collected and ready for pickup.  I told Sissy that I will contact Raj with [facility] to  sample.      Raj contacted and sample has been received.     6/7/25 2100:  Received message from Gelacio Casas that patient has a negative crossmatch, pt notified with no new updates at this time.     6/8/25 1700:  reached out to patient.  No new updates at this time.      2115:  Received call from Gelacio Casas that donor is set for the OR at 2100 on 6/8/25.  Patient is now up as primary for one of the kidneys.  Contacted pt with update and made NPO as of now.  Will provide additional updates once available and pt should expect an update later tonight.  Patient states understanding and did not have any further questions at this time.    6/9/25 0100:  Received call from Ryan Buckley, .   We've accepted the kidney for patient and to have pt admitted.  OR is scheduled for 0900 with Dr. Mcfadden.    0130:  spoke to patient and instructed to proceed to [facility] for admission and to check in through the Emergency Dept.  Informed patient that his surgery time is scheduled for 0900.  Patient has been NPO since 2100.  Last PD was on 6/7/25 with no remaining fluids.  ETA 0330. Patient did not have any further questions at this time.     The following have been notified of admission.   Admit: Ruthie Kendrick Supervisor:  Leana  RON: Genny Jefferson  [facility] charge:  Tiffanie RICO# [id number]    6/9/25 0700:  case reported and passed on to Ruchi SANCHEZ

## 2025-06-07 NOTE — TELEPHONE ENCOUNTER
ON-CALL NOTE    UNOS# [id number]  Back-up    6/7/2025 06:05 am  Notified by Ryan Thayer, , that Jeffrey Moreno is eligible for kidney offer.  Spoke with patient and identified no acute medical issues with telephone assessment. Protocol script read to patient regarding N/A, standard donor offer. Patient verbalized understanding, all questions answered, patient accepts organ offer. Notified by Ryan Thayer that virtual crossmatch is not available.  Last sample of blood is available from date 8/2024.  Patient reports no sensitizing event since last blood sample for PRA received.   Patient will need to go to ER and provide a fresh sample for prospective crossmatch. Order placed.      Patient notified of plan to go to ER at [facility] to provide fresh sample today and states understanding.      07:45  Report given to Marlene Pedraza RN

## 2025-06-08 ENCOUNTER — DOCUMENTATION ONLY (OUTPATIENT)
Dept: TRANSPLANT | Facility: HOSPITAL | Age: 30
End: 2025-06-08
Payer: MEDICARE

## 2025-06-09 ENCOUNTER — ANESTHESIA (OUTPATIENT)
Dept: SURGERY | Facility: HOSPITAL | Age: 30
DRG: 652 | End: 2025-06-09
Payer: MEDICARE

## 2025-06-09 ENCOUNTER — TELEPHONE (OUTPATIENT)
Dept: TRANSPLANT | Facility: CLINIC | Age: 30
End: 2025-06-09
Payer: MEDICARE

## 2025-06-09 ENCOUNTER — ANESTHESIA EVENT (OUTPATIENT)
Dept: SURGERY | Facility: HOSPITAL | Age: 30
DRG: 652 | End: 2025-06-09
Payer: MEDICARE

## 2025-06-09 ENCOUNTER — HOSPITAL ENCOUNTER (INPATIENT)
Facility: HOSPITAL | Age: 30
LOS: 2 days | Discharge: HOME | DRG: 652 | End: 2025-06-11
Attending: TRANSPLANT SURGERY | Admitting: TRANSPLANT SURGERY
Payer: MEDICARE

## 2025-06-09 DIAGNOSIS — Z94.0 STATUS POST KIDNEY TRANSPLANT: Primary | ICD-10-CM

## 2025-06-09 DIAGNOSIS — Z29.89 NEED FOR PROPHYLACTIC IMMUNOTHERAPY: ICD-10-CM

## 2025-06-09 DIAGNOSIS — Z79.61 LONG-TERM CURRENT USE OF IMMUNOMODULATOR: ICD-10-CM

## 2025-06-09 DIAGNOSIS — N18.6 ESRD (END STAGE RENAL DISEASE): ICD-10-CM

## 2025-06-09 DIAGNOSIS — Z01.818 PRE-OP EVALUATION: ICD-10-CM

## 2025-06-09 DIAGNOSIS — Z91.89 AT RISK FOR OPPORTUNISTIC INFECTIONS: ICD-10-CM

## 2025-06-09 DIAGNOSIS — I10 ESSENTIAL HYPERTENSION: Chronic | ICD-10-CM

## 2025-06-09 DIAGNOSIS — N18.6 ESRD (END STAGE RENAL DISEASE) ON DIALYSIS: Chronic | ICD-10-CM

## 2025-06-09 DIAGNOSIS — Z99.2 ESRD (END STAGE RENAL DISEASE) ON DIALYSIS: Chronic | ICD-10-CM

## 2025-06-09 LAB
ABSOLUTE EOSINOPHIL (OHS): 0.02 K/UL
ABSOLUTE EOSINOPHIL (OHS): 1.48 K/UL
ABSOLUTE MONOCYTE (OHS): 0.22 K/UL (ref 0.3–1)
ABSOLUTE MONOCYTE (OHS): 0.59 K/UL (ref 0.3–1)
ABSOLUTE NEUTROPHIL COUNT (OHS): 3.64 K/UL (ref 1.8–7.7)
ABSOLUTE NEUTROPHIL COUNT (OHS): 7.98 K/UL (ref 1.8–7.7)
ALBUMIN SERPL BCP-MCNC: 3.1 G/DL (ref 3.5–5.2)
ALBUMIN SERPL BCP-MCNC: 3.4 G/DL (ref 3.5–5.2)
ALBUMIN SERPL BCP-MCNC: 3.7 G/DL (ref 3.5–5.2)
ALP SERPL-CCNC: 87 UNIT/L (ref 40–150)
ALT SERPL W/O P-5'-P-CCNC: 38 UNIT/L (ref 10–44)
ANION GAP (OHS): 12 MMOL/L (ref 8–16)
ANION GAP (OHS): 14 MMOL/L (ref 8–16)
ANION GAP (OHS): 8 MMOL/L (ref 8–16)
APPEARANCE FLD: NORMAL
AST SERPL-CCNC: 34 UNIT/L (ref 11–45)
BASOPHILS # BLD AUTO: 0.01 K/UL
BASOPHILS # BLD AUTO: 0.08 K/UL
BASOPHILS NFR BLD AUTO: 0.1 %
BASOPHILS NFR BLD AUTO: 1 %
BILIRUB SERPL-MCNC: 0.3 MG/DL (ref 0.1–1)
BUN SERPL-MCNC: 83 MG/DL (ref 6–20)
BUN SERPL-MCNC: 92 MG/DL (ref 6–20)
BUN SERPL-MCNC: 93 MG/DL (ref 6–20)
CALCIUM SERPL-MCNC: 8.2 MG/DL (ref 8.7–10.5)
CALCIUM SERPL-MCNC: 8.4 MG/DL (ref 8.7–10.5)
CALCIUM SERPL-MCNC: 9.3 MG/DL (ref 8.7–10.5)
CHLORIDE SERPL-SCNC: 106 MMOL/L (ref 95–110)
CHLORIDE SERPL-SCNC: 109 MMOL/L (ref 95–110)
CHLORIDE SERPL-SCNC: 110 MMOL/L (ref 95–110)
CO2 SERPL-SCNC: 20 MMOL/L (ref 23–29)
CO2 SERPL-SCNC: 21 MMOL/L (ref 23–29)
CO2 SERPL-SCNC: 22 MMOL/L (ref 23–29)
COLOR FLD: COLORLESS
CREAT SERPL-MCNC: 14.9 MG/DL (ref 0.5–1.4)
CREAT SERPL-MCNC: 18.2 MG/DL (ref 0.5–1.4)
CREAT SERPL-MCNC: 19.4 MG/DL (ref 0.5–1.4)
EOSINOPHIL NFR FLD MANUAL: 39 %
ERYTHROCYTE [DISTWIDTH] IN BLOOD BY AUTOMATED COUNT: 14.5 % (ref 11.5–14.5)
ERYTHROCYTE [DISTWIDTH] IN BLOOD BY AUTOMATED COUNT: 14.6 % (ref 11.5–14.5)
GFR SERPLBLD CREATININE-BSD FMLA CKD-EPI: 3 ML/MIN/1.73/M2
GFR SERPLBLD CREATININE-BSD FMLA CKD-EPI: 3 ML/MIN/1.73/M2
GFR SERPLBLD CREATININE-BSD FMLA CKD-EPI: 4 ML/MIN/1.73/M2
GLUCOSE SERPL-MCNC: 101 MG/DL (ref 70–110)
GLUCOSE SERPL-MCNC: 121 MG/DL (ref 70–110)
GLUCOSE SERPL-MCNC: 84 MG/DL (ref 70–110)
GRAM STN SPEC: NORMAL
HBV CORE AB SERPL QL IA: NORMAL
HBV CORE IGM SERPL QL IA: NORMAL
HBV SURFACE AG SERPL QL IA: NORMAL
HCT VFR BLD AUTO: 30.2 % (ref 40–54)
HCT VFR BLD AUTO: 32.5 % (ref 40–54)
HCT VFR BLD AUTO: 33.2 % (ref 40–54)
HCV AB SERPL QL IA: NORMAL
HCV RNA SERPL NAA+PROBE-LOG IU: NOT DETECTED {LOG_IU}/ML
HCV RNA SERPL NAA+PROBE-LOG IU: NOT DETECTED {LOG_IU}/ML
HGB BLD-MCNC: 10.5 GM/DL (ref 14–18)
HGB BLD-MCNC: 10.7 GM/DL (ref 14–18)
HIV 1+2 AB+HIV1 P24 AG SERPL QL IA: NORMAL
IMM GRANULOCYTES # BLD AUTO: 0.02 K/UL (ref 0–0.04)
IMM GRANULOCYTES # BLD AUTO: 0.02 K/UL (ref 0–0.04)
IMM GRANULOCYTES NFR BLD AUTO: 0.2 % (ref 0–0.5)
IMM GRANULOCYTES NFR BLD AUTO: 0.3 % (ref 0–0.5)
INDIRECT COOMBS: NORMAL
INR PPP: 0.9 (ref 0.8–1.2)
LYMPHOCYTES # BLD AUTO: 0.03 K/UL (ref 1–4.8)
LYMPHOCYTES # BLD AUTO: 1.83 K/UL (ref 1–4.8)
LYMPHOCYTES NFR FLD MANUAL: 25 %
MCH RBC QN AUTO: 27.9 PG (ref 27–31)
MCH RBC QN AUTO: 28 PG (ref 27–31)
MCHC RBC AUTO-ENTMCNC: 32.2 G/DL (ref 32–36)
MCHC RBC AUTO-ENTMCNC: 32.3 G/DL (ref 32–36)
MCV RBC AUTO: 86 FL (ref 82–98)
MCV RBC AUTO: 87 FL (ref 82–98)
MESOTHL CELL NFR FLD MANUAL: 1 %
MONOS+MACROS NFR FLD MANUAL: 33 %
NEUTROPHILS NFR FLD MANUAL: 2 %
NUCLEATED RBC (/100WBC) (OHS): 0 /100 WBC
NUCLEATED RBC (/100WBC) (OHS): 0 /100 WBC
OHS QRS DURATION: 86 MS
OHS QTC CALCULATION: 441 MS
PATHOLOGIST REVIEW - BODY FLUID (OHS): NORMAL
PHOSPHATE SERPL-MCNC: 4.4 MG/DL (ref 2.7–4.5)
PHOSPHATE SERPL-MCNC: 4.7 MG/DL (ref 2.7–4.5)
PHOSPHATE SERPL-MCNC: 4.9 MG/DL (ref 2.7–4.5)
PLATELET # BLD AUTO: 218 K/UL (ref 150–450)
PLATELET # BLD AUTO: 228 K/UL (ref 150–450)
PMV BLD AUTO: 9.2 FL (ref 9.2–12.9)
PMV BLD AUTO: 9.6 FL (ref 9.2–12.9)
POCT GLUCOSE: 219 MG/DL (ref 70–110)
POCT GLUCOSE: 98 MG/DL (ref 70–110)
POTASSIUM SERPL-SCNC: 4.2 MMOL/L (ref 3.5–5.1)
POTASSIUM SERPL-SCNC: 4.4 MMOL/L (ref 3.5–5.1)
POTASSIUM SERPL-SCNC: 5.6 MMOL/L (ref 3.5–5.1)
PROT SERPL-MCNC: 6.9 GM/DL (ref 6–8.4)
PROTHROMBIN TIME: 10.3 SECONDS (ref 9–12.5)
RBC # BLD AUTO: 3.77 M/UL (ref 4.6–6.2)
RBC # BLD AUTO: 3.82 M/UL (ref 4.6–6.2)
RELATIVE EOSINOPHIL (OHS): 0.2 %
RELATIVE EOSINOPHIL (OHS): 19.4 %
RELATIVE LYMPHOCYTE (OHS): 0.4 % (ref 18–48)
RELATIVE LYMPHOCYTE (OHS): 24 % (ref 18–48)
RELATIVE MONOCYTE (OHS): 2.7 % (ref 4–15)
RELATIVE MONOCYTE (OHS): 7.7 % (ref 4–15)
RELATIVE NEUTROPHIL (OHS): 47.6 % (ref 38–73)
RELATIVE NEUTROPHIL (OHS): 96.4 % (ref 38–73)
RH BLD: NORMAL
SODIUM SERPL-SCNC: 140 MMOL/L (ref 136–145)
SODIUM SERPL-SCNC: 141 MMOL/L (ref 136–145)
SODIUM SERPL-SCNC: 141 MMOL/L (ref 136–145)
SPECIMEN OUTDATE: NORMAL
WBC # BLD AUTO: 7.64 K/UL (ref 3.9–12.7)
WBC # BLD AUTO: 8.28 K/UL (ref 3.9–12.7)
WBC # FLD: 21 /CU MM

## 2025-06-09 PROCEDURE — 85025 COMPLETE CBC W/AUTO DIFF WBC: CPT | Mod: NTX | Performed by: PHYSICIAN ASSISTANT

## 2025-06-09 PROCEDURE — 80053 COMPREHEN METABOLIC PANEL: CPT | Mod: NTX | Performed by: PHYSICIAN ASSISTANT

## 2025-06-09 PROCEDURE — 27000207 HC ISOLATION

## 2025-06-09 PROCEDURE — 20600001 HC STEP DOWN PRIVATE ROOM

## 2025-06-09 PROCEDURE — 99223 1ST HOSP IP/OBS HIGH 75: CPT | Mod: 57,AI,NTX, | Performed by: PHYSICIAN ASSISTANT

## 2025-06-09 PROCEDURE — 71000033 HC RECOVERY, INTIAL HOUR: Performed by: TRANSPLANT SURGERY

## 2025-06-09 PROCEDURE — S5010 5% DEXTROSE AND 0.45% SALINE: HCPCS | Performed by: TRANSPLANT SURGERY

## 2025-06-09 PROCEDURE — 93010 ELECTROCARDIOGRAM REPORT: CPT | Mod: NTX,,, | Performed by: INTERNAL MEDICINE

## 2025-06-09 PROCEDURE — 25000003 PHARM REV CODE 250: Mod: NTX | Performed by: PHYSICIAN ASSISTANT

## 2025-06-09 PROCEDURE — 81200001 HC KIDNEY ACQUISITION - CADAVER

## 2025-06-09 PROCEDURE — 0WPG03Z REMOVAL OF INFUSION DEVICE FROM PERITONEAL CAVITY, OPEN APPROACH: ICD-10-PCS | Performed by: TRANSPLANT SURGERY

## 2025-06-09 PROCEDURE — 63600175 PHARM REV CODE 636 W HCPCS: Mod: NTX | Performed by: PHYSICIAN ASSISTANT

## 2025-06-09 PROCEDURE — 82040 ASSAY OF SERUM ALBUMIN: CPT | Performed by: NURSE PRACTITIONER

## 2025-06-09 PROCEDURE — 25000003 PHARM REV CODE 250: Performed by: NURSE ANESTHETIST, CERTIFIED REGISTERED

## 2025-06-09 PROCEDURE — 89051 BODY FLUID CELL COUNT: CPT | Mod: NTX | Performed by: PHYSICIAN ASSISTANT

## 2025-06-09 PROCEDURE — 63600175 PHARM REV CODE 636 W HCPCS: Performed by: ANESTHESIOLOGY

## 2025-06-09 PROCEDURE — 87205 SMEAR GRAM STAIN: CPT | Performed by: PHYSICIAN ASSISTANT

## 2025-06-09 PROCEDURE — 37000008 HC ANESTHESIA 1ST 15 MINUTES: Performed by: TRANSPLANT SURGERY

## 2025-06-09 PROCEDURE — 82040 ASSAY OF SERUM ALBUMIN: CPT | Performed by: TRANSPLANT SURGERY

## 2025-06-09 PROCEDURE — 63600175 PHARM REV CODE 636 W HCPCS: Performed by: NURSE PRACTITIONER

## 2025-06-09 PROCEDURE — 27201423 OPTIME MED/SURG SUP & DEVICES STERILE SUPPLY: Performed by: TRANSPLANT SURGERY

## 2025-06-09 PROCEDURE — 86901 BLOOD TYPING SEROLOGIC RH(D): CPT | Performed by: PHYSICIAN ASSISTANT

## 2025-06-09 PROCEDURE — 36000931 HC OR TIME LEV VII EA ADD 15 MIN: Performed by: TRANSPLANT SURGERY

## 2025-06-09 PROCEDURE — 27200950 HC CAPD SUPPORT: Mod: NTX

## 2025-06-09 PROCEDURE — 85610 PROTHROMBIN TIME: CPT | Mod: NTX | Performed by: PHYSICIAN ASSISTANT

## 2025-06-09 PROCEDURE — 37000009 HC ANESTHESIA EA ADD 15 MINS: Performed by: TRANSPLANT SURGERY

## 2025-06-09 PROCEDURE — 63600175 PHARM REV CODE 636 W HCPCS: Performed by: TRANSPLANT SURGERY

## 2025-06-09 PROCEDURE — 86705 HEP B CORE ANTIBODY IGM: CPT | Mod: NTX | Performed by: PHYSICIAN ASSISTANT

## 2025-06-09 PROCEDURE — 71000016 HC POSTOP RECOV ADDL HR: Performed by: TRANSPLANT SURGERY

## 2025-06-09 PROCEDURE — 86706 HEP B SURFACE ANTIBODY: CPT | Mod: NTX | Performed by: PHYSICIAN ASSISTANT

## 2025-06-09 PROCEDURE — 90945 DIALYSIS ONE EVALUATION: CPT | Mod: NTX

## 2025-06-09 PROCEDURE — 87205 SMEAR GRAM STAIN: CPT | Mod: NTX | Performed by: PHYSICIAN ASSISTANT

## 2025-06-09 PROCEDURE — 84100 ASSAY OF PHOSPHORUS: CPT | Mod: NTX | Performed by: PHYSICIAN ASSISTANT

## 2025-06-09 PROCEDURE — C2617 STENT, NON-COR, TEM W/O DEL: HCPCS | Performed by: TRANSPLANT SURGERY

## 2025-06-09 PROCEDURE — C1729 CATH, DRAINAGE: HCPCS | Performed by: TRANSPLANT SURGERY

## 2025-06-09 PROCEDURE — 63600175 PHARM REV CODE 636 W HCPCS: Mod: JZ,TB,NTX | Performed by: PHYSICIAN ASSISTANT

## 2025-06-09 PROCEDURE — 93005 ELECTROCARDIOGRAM TRACING: CPT | Mod: NTX

## 2025-06-09 PROCEDURE — 25000003 PHARM REV CODE 250: Performed by: TRANSPLANT SURGERY

## 2025-06-09 PROCEDURE — 25000003 PHARM REV CODE 250: Performed by: NURSE PRACTITIONER

## 2025-06-09 PROCEDURE — 87075 CULTR BACTERIA EXCEPT BLOOD: CPT | Mod: NTX | Performed by: PHYSICIAN ASSISTANT

## 2025-06-09 PROCEDURE — 86803 HEPATITIS C AB TEST: CPT | Mod: NTX | Performed by: PHYSICIAN ASSISTANT

## 2025-06-09 PROCEDURE — 36415 COLL VENOUS BLD VENIPUNCTURE: CPT | Mod: NTX

## 2025-06-09 PROCEDURE — 36000930 HC OR TIME LEV VII 1ST 15 MIN: Performed by: TRANSPLANT SURGERY

## 2025-06-09 PROCEDURE — 86704 HEP B CORE ANTIBODY TOTAL: CPT | Mod: NTX | Performed by: PHYSICIAN ASSISTANT

## 2025-06-09 PROCEDURE — 71000015 HC POSTOP RECOV 1ST HR: Performed by: TRANSPLANT SURGERY

## 2025-06-09 PROCEDURE — 87522 HEPATITIS C REVRS TRNSCRPJ: CPT | Mod: NTX

## 2025-06-09 PROCEDURE — 85025 COMPLETE CBC W/AUTO DIFF WBC: CPT | Performed by: NURSE PRACTITIONER

## 2025-06-09 PROCEDURE — 63600175 PHARM REV CODE 636 W HCPCS: Performed by: NURSE ANESTHETIST, CERTIFIED REGISTERED

## 2025-06-09 PROCEDURE — 0TY10Z0 TRANSPLANTATION OF LEFT KIDNEY, ALLOGENEIC, OPEN APPROACH: ICD-10-PCS | Performed by: TRANSPLANT SURGERY

## 2025-06-09 PROCEDURE — 85014 HEMATOCRIT: CPT | Performed by: TRANSPLANT SURGERY

## 2025-06-09 PROCEDURE — 36415 COLL VENOUS BLD VENIPUNCTURE: CPT | Mod: NTX | Performed by: PHYSICIAN ASSISTANT

## 2025-06-09 PROCEDURE — 87522 HEPATITIS C REVRS TRNSCRPJ: CPT | Mod: NTX | Performed by: PHYSICIAN ASSISTANT

## 2025-06-09 PROCEDURE — 87389 HIV-1 AG W/HIV-1&-2 AB AG IA: CPT | Mod: NTX | Performed by: PHYSICIAN ASSISTANT

## 2025-06-09 PROCEDURE — 87340 HEPATITIS B SURFACE AG IA: CPT | Mod: NTX | Performed by: PHYSICIAN ASSISTANT

## 2025-06-09 DEVICE — STENT DOUBLE J 7FRX12CM: Type: IMPLANTABLE DEVICE | Site: URETER | Status: FUNCTIONAL

## 2025-06-09 RX ORDER — FUROSEMIDE 10 MG/ML
80 INJECTION INTRAMUSCULAR; INTRAVENOUS ONCE
Status: DISCONTINUED | OUTPATIENT
Start: 2025-06-09 | End: 2025-06-09

## 2025-06-09 RX ORDER — ONDANSETRON HYDROCHLORIDE 2 MG/ML
4 INJECTION, SOLUTION INTRAVENOUS EVERY 6 HOURS PRN
Status: DISCONTINUED | OUTPATIENT
Start: 2025-06-09 | End: 2025-06-11 | Stop reason: HOSPADM

## 2025-06-09 RX ORDER — TACROLIMUS 1 MG/1
3 CAPSULE ORAL 2 TIMES DAILY
Status: DISCONTINUED | OUTPATIENT
Start: 2025-06-09 | End: 2025-06-10

## 2025-06-09 RX ORDER — HYDRALAZINE HYDROCHLORIDE 50 MG/1
50 TABLET, FILM COATED ORAL EVERY 8 HOURS
Status: DISCONTINUED | OUTPATIENT
Start: 2025-06-09 | End: 2025-06-09

## 2025-06-09 RX ORDER — MIDAZOLAM HYDROCHLORIDE 1 MG/ML
INJECTION INTRAMUSCULAR; INTRAVENOUS
Status: DISCONTINUED | OUTPATIENT
Start: 2025-06-09 | End: 2025-06-09

## 2025-06-09 RX ORDER — IBUPROFEN 200 MG
16 TABLET ORAL
Status: DISCONTINUED | OUTPATIENT
Start: 2025-06-09 | End: 2025-06-11 | Stop reason: HOSPADM

## 2025-06-09 RX ORDER — DIPHENHYDRAMINE HCL 25 MG
25 CAPSULE ORAL
Status: COMPLETED | OUTPATIENT
Start: 2025-06-10 | End: 2025-06-10

## 2025-06-09 RX ORDER — VALGANCICLOVIR 450 MG/1
450 TABLET, FILM COATED ORAL EVERY MORNING
Status: DISCONTINUED | OUTPATIENT
Start: 2025-06-19 | End: 2025-06-11 | Stop reason: HOSPADM

## 2025-06-09 RX ORDER — FAMOTIDINE 20 MG/1
20 TABLET, FILM COATED ORAL NIGHTLY
Status: DISCONTINUED | OUTPATIENT
Start: 2025-06-09 | End: 2025-06-11 | Stop reason: HOSPADM

## 2025-06-09 RX ORDER — HYDRALAZINE HYDROCHLORIDE 20 MG/ML
10 INJECTION INTRAMUSCULAR; INTRAVENOUS EVERY 6 HOURS PRN
Status: DISCONTINUED | OUTPATIENT
Start: 2025-06-09 | End: 2025-06-11 | Stop reason: HOSPADM

## 2025-06-09 RX ORDER — FENTANYL CITRATE 50 UG/ML
25 INJECTION, SOLUTION INTRAMUSCULAR; INTRAVENOUS EVERY 5 MIN PRN
Status: DISCONTINUED | OUTPATIENT
Start: 2025-06-09 | End: 2025-06-09 | Stop reason: HOSPADM

## 2025-06-09 RX ORDER — SODIUM CHLORIDE 0.9 % (FLUSH) 0.9 %
10 SYRINGE (ML) INJECTION
Status: DISCONTINUED | OUTPATIENT
Start: 2025-06-09 | End: 2025-06-09

## 2025-06-09 RX ORDER — SODIUM CHLORIDE 0.9 % (FLUSH) 0.9 %
10 SYRINGE (ML) INJECTION
Status: DISCONTINUED | OUTPATIENT
Start: 2025-06-09 | End: 2025-06-11 | Stop reason: HOSPADM

## 2025-06-09 RX ORDER — HYDRALAZINE HYDROCHLORIDE 50 MG/1
50 TABLET, FILM COATED ORAL EVERY 8 HOURS
Status: DISCONTINUED | OUTPATIENT
Start: 2025-06-09 | End: 2025-06-10

## 2025-06-09 RX ORDER — MUPIROCIN 20 MG/G
1 OINTMENT TOPICAL 2 TIMES DAILY
Status: DISCONTINUED | OUTPATIENT
Start: 2025-06-09 | End: 2025-06-11 | Stop reason: HOSPADM

## 2025-06-09 RX ORDER — SODIUM CHLORIDE 0.9 % (FLUSH) 0.9 %
3 SYRINGE (ML) INJECTION
Status: DISCONTINUED | OUTPATIENT
Start: 2025-06-09 | End: 2025-06-09 | Stop reason: HOSPADM

## 2025-06-09 RX ORDER — PHENYLEPHRINE HCL IN 0.9% NACL 1 MG/10 ML
SYRINGE (ML) INTRAVENOUS
Status: DISCONTINUED | OUTPATIENT
Start: 2025-06-09 | End: 2025-06-09

## 2025-06-09 RX ORDER — EPINEPHRINE 1 MG/ML
1 INJECTION, SOLUTION, CONCENTRATE INTRAVENOUS ONCE AS NEEDED
Status: DISCONTINUED | OUTPATIENT
Start: 2025-06-09 | End: 2025-06-11 | Stop reason: HOSPADM

## 2025-06-09 RX ORDER — LABETALOL 100 MG/1
400 TABLET, FILM COATED ORAL ONCE
Status: COMPLETED | OUTPATIENT
Start: 2025-06-09 | End: 2025-06-09

## 2025-06-09 RX ORDER — IBUPROFEN 200 MG
24 TABLET ORAL
Status: DISCONTINUED | OUTPATIENT
Start: 2025-06-09 | End: 2025-06-11 | Stop reason: HOSPADM

## 2025-06-09 RX ORDER — SULFAMETHOXAZOLE AND TRIMETHOPRIM 400; 80 MG/1; MG/1
1 TABLET ORAL EVERY MORNING
Status: DISCONTINUED | OUTPATIENT
Start: 2025-06-19 | End: 2025-06-11 | Stop reason: HOSPADM

## 2025-06-09 RX ORDER — DOCUSATE SODIUM 100 MG/1
100 CAPSULE, LIQUID FILLED ORAL 3 TIMES DAILY
Status: DISCONTINUED | OUTPATIENT
Start: 2025-06-09 | End: 2025-06-11 | Stop reason: HOSPADM

## 2025-06-09 RX ORDER — GLUCAGON 1 MG
1 KIT INJECTION CONTINUOUS PRN
Status: DISCONTINUED | OUTPATIENT
Start: 2025-06-09 | End: 2025-06-11 | Stop reason: HOSPADM

## 2025-06-09 RX ORDER — HEPARIN SODIUM 5000 [USP'U]/ML
5000 INJECTION, SOLUTION INTRAVENOUS; SUBCUTANEOUS ONCE
Status: COMPLETED | OUTPATIENT
Start: 2025-06-09 | End: 2025-06-09

## 2025-06-09 RX ORDER — ROCURONIUM BROMIDE 10 MG/ML
INJECTION, SOLUTION INTRAVENOUS
Status: DISCONTINUED | OUTPATIENT
Start: 2025-06-09 | End: 2025-06-09

## 2025-06-09 RX ORDER — ONDANSETRON HYDROCHLORIDE 2 MG/ML
INJECTION, SOLUTION INTRAVENOUS
Status: DISCONTINUED | OUTPATIENT
Start: 2025-06-09 | End: 2025-06-09

## 2025-06-09 RX ORDER — DIPHENHYDRAMINE HCL 25 MG
50 CAPSULE ORAL ONCE AS NEEDED
Status: DISCONTINUED | OUTPATIENT
Start: 2025-06-09 | End: 2025-06-11 | Stop reason: HOSPADM

## 2025-06-09 RX ORDER — ACETAMINOPHEN 325 MG/1
650 TABLET ORAL EVERY 8 HOURS
Status: COMPLETED | OUTPATIENT
Start: 2025-06-09 | End: 2025-06-11

## 2025-06-09 RX ORDER — METHYLPREDNISOLONE SOD SUCC 125 MG
250 VIAL (EA) INJECTION ONCE
Status: COMPLETED | OUTPATIENT
Start: 2025-06-10 | End: 2025-06-10

## 2025-06-09 RX ORDER — ACETAMINOPHEN 650 MG/20.3ML
650 LIQUID ORAL ONCE
Status: COMPLETED | OUTPATIENT
Start: 2025-06-09 | End: 2025-06-09

## 2025-06-09 RX ORDER — DIPHENHYDRAMINE HYDROCHLORIDE 50 MG/ML
50 INJECTION, SOLUTION INTRAMUSCULAR; INTRAVENOUS ONCE
Status: COMPLETED | OUTPATIENT
Start: 2025-06-09 | End: 2025-06-09

## 2025-06-09 RX ORDER — HEPARIN SOD,PORCINE/0.9 % NACL 1000/500ML
INTRAVENOUS SOLUTION INTRAVENOUS
Status: DISCONTINUED | OUTPATIENT
Start: 2025-06-09 | End: 2025-06-09

## 2025-06-09 RX ORDER — FUROSEMIDE 10 MG/ML
INJECTION INTRAMUSCULAR; INTRAVENOUS
Status: DISCONTINUED | OUTPATIENT
Start: 2025-06-09 | End: 2025-06-09

## 2025-06-09 RX ORDER — FENTANYL CITRATE 50 UG/ML
INJECTION, SOLUTION INTRAMUSCULAR; INTRAVENOUS
Status: DISCONTINUED | OUTPATIENT
Start: 2025-06-09 | End: 2025-06-09

## 2025-06-09 RX ORDER — MANNITOL 250 MG/ML
INJECTION, SOLUTION INTRAVENOUS
Status: DISCONTINUED | OUTPATIENT
Start: 2025-06-09 | End: 2025-06-09

## 2025-06-09 RX ORDER — LABETALOL 100 MG/1
200 TABLET, FILM COATED ORAL EVERY 12 HOURS
Status: DISCONTINUED | OUTPATIENT
Start: 2025-06-09 | End: 2025-06-11

## 2025-06-09 RX ORDER — ACETAMINOPHEN 325 MG/1
650 TABLET ORAL
Status: ACTIVE | OUTPATIENT
Start: 2025-06-10 | End: 2025-06-11

## 2025-06-09 RX ORDER — SODIUM CHLORIDE 9 MG/ML
INJECTION, SOLUTION INTRAVENOUS CONTINUOUS
Status: DISCONTINUED | OUTPATIENT
Start: 2025-06-09 | End: 2025-06-09

## 2025-06-09 RX ORDER — GLUCAGON 1 MG
1 KIT INJECTION
Status: DISCONTINUED | OUTPATIENT
Start: 2025-06-09 | End: 2025-06-09 | Stop reason: HOSPADM

## 2025-06-09 RX ORDER — BISACODYL 5 MG
10 TABLET, DELAYED RELEASE (ENTERIC COATED) ORAL NIGHTLY
Status: DISCONTINUED | OUTPATIENT
Start: 2025-06-09 | End: 2025-06-11 | Stop reason: HOSPADM

## 2025-06-09 RX ORDER — PREGABALIN 75 MG/1
75 CAPSULE ORAL
Status: COMPLETED | OUTPATIENT
Start: 2025-06-09 | End: 2025-06-09

## 2025-06-09 RX ORDER — PROPOFOL 10 MG/ML
VIAL (ML) INTRAVENOUS
Status: DISCONTINUED | OUTPATIENT
Start: 2025-06-09 | End: 2025-06-09

## 2025-06-09 RX ORDER — CEFAZOLIN 2 G/1
2 INJECTION, POWDER, FOR SOLUTION INTRAMUSCULAR; INTRAVENOUS
Status: COMPLETED | OUTPATIENT
Start: 2025-06-09 | End: 2025-06-10

## 2025-06-09 RX ORDER — OXYCODONE HYDROCHLORIDE 10 MG/1
10 TABLET ORAL EVERY 4 HOURS PRN
Refills: 0 | Status: DISCONTINUED | OUTPATIENT
Start: 2025-06-09 | End: 2025-06-11 | Stop reason: HOSPADM

## 2025-06-09 RX ORDER — TRAMADOL HYDROCHLORIDE 50 MG/1
50 TABLET, FILM COATED ORAL EVERY 6 HOURS PRN
Status: DISCONTINUED | OUTPATIENT
Start: 2025-06-09 | End: 2025-06-09

## 2025-06-09 RX ORDER — LIDOCAINE HYDROCHLORIDE 20 MG/ML
INJECTION INTRAVENOUS
Status: DISCONTINUED | OUTPATIENT
Start: 2025-06-09 | End: 2025-06-09

## 2025-06-09 RX ORDER — MYCOPHENOLATE MOFETIL 250 MG/1
1000 CAPSULE ORAL 2 TIMES DAILY
Status: DISCONTINUED | OUTPATIENT
Start: 2025-06-09 | End: 2025-06-11 | Stop reason: HOSPADM

## 2025-06-09 RX ORDER — SODIUM CHLORIDE 9 MG/ML
INJECTION, SOLUTION INTRAVENOUS CONTINUOUS
Status: DISCONTINUED | OUTPATIENT
Start: 2025-06-09 | End: 2025-06-10

## 2025-06-09 RX ORDER — CEFAZOLIN 2 G/1
2 INJECTION, POWDER, FOR SOLUTION INTRAMUSCULAR; INTRAVENOUS
Status: COMPLETED | OUTPATIENT
Start: 2025-06-09 | End: 2025-06-09

## 2025-06-09 RX ORDER — HEPARIN SODIUM 5000 [USP'U]/ML
5000 INJECTION, SOLUTION INTRAVENOUS; SUBCUTANEOUS EVERY 8 HOURS
Status: DISCONTINUED | OUTPATIENT
Start: 2025-06-09 | End: 2025-06-11 | Stop reason: HOSPADM

## 2025-06-09 RX ORDER — BUPIVACAINE HYDROCHLORIDE 2.5 MG/ML
INJECTION, SOLUTION EPIDURAL; INFILTRATION; INTRACAUDAL; PERINEURAL
Status: DISCONTINUED | OUTPATIENT
Start: 2025-06-09 | End: 2025-06-09

## 2025-06-09 RX ORDER — DEXTROSE MONOHYDRATE AND SODIUM CHLORIDE 5; .45 G/100ML; G/100ML
INJECTION, SOLUTION INTRAVENOUS CONTINUOUS
Status: DISCONTINUED | OUTPATIENT
Start: 2025-06-09 | End: 2025-06-10

## 2025-06-09 RX ORDER — OXYCODONE HYDROCHLORIDE 5 MG/1
5 TABLET ORAL EVERY 4 HOURS PRN
Refills: 0 | Status: DISCONTINUED | OUTPATIENT
Start: 2025-06-09 | End: 2025-06-11 | Stop reason: HOSPADM

## 2025-06-09 RX ORDER — INSULIN ASPART 100 [IU]/ML
0-5 INJECTION, SOLUTION INTRAVENOUS; SUBCUTANEOUS
Status: DISCONTINUED | OUTPATIENT
Start: 2025-06-09 | End: 2025-06-11 | Stop reason: HOSPADM

## 2025-06-09 RX ORDER — PREDNISONE 20 MG/1
20 TABLET ORAL DAILY
Status: DISCONTINUED | OUTPATIENT
Start: 2025-06-11 | End: 2025-06-11 | Stop reason: HOSPADM

## 2025-06-09 RX ORDER — OXYCODONE HYDROCHLORIDE 5 MG/1
5 TABLET ORAL EVERY 6 HOURS PRN
Status: DISCONTINUED | OUTPATIENT
Start: 2025-06-09 | End: 2025-06-09

## 2025-06-09 RX ORDER — MUPIROCIN 20 MG/G
OINTMENT TOPICAL
Status: DISCONTINUED | OUTPATIENT
Start: 2025-06-09 | End: 2025-06-09

## 2025-06-09 RX ADMIN — ROCURONIUM BROMIDE 10 MG: 10 INJECTION, SOLUTION INTRAVENOUS at 12:06

## 2025-06-09 RX ADMIN — CEFAZOLIN 2 G: 2 INJECTION, POWDER, FOR SOLUTION INTRAMUSCULAR; INTRAVENOUS at 05:06

## 2025-06-09 RX ADMIN — SODIUM CHLORIDE: 0.9 INJECTION, SOLUTION INTRAVENOUS at 01:06

## 2025-06-09 RX ADMIN — LABETALOL HYDROCHLORIDE 400 MG: 100 TABLET, FILM COATED ORAL at 06:06

## 2025-06-09 RX ADMIN — FENTANYL CITRATE 50 MCG: 50 INJECTION, SOLUTION INTRAMUSCULAR; INTRAVENOUS at 10:06

## 2025-06-09 RX ADMIN — DIPHENHYDRAMINE HYDROCHLORIDE 50 MG: 50 INJECTION, SOLUTION INTRAMUSCULAR; INTRAVENOUS at 10:06

## 2025-06-09 RX ADMIN — HYDRALAZINE HYDROCHLORIDE 10 MG: 20 INJECTION, SOLUTION INTRAMUSCULAR; INTRAVENOUS at 07:06

## 2025-06-09 RX ADMIN — FENTANYL CITRATE 25 MCG: 50 INJECTION INTRAMUSCULAR; INTRAVENOUS at 02:06

## 2025-06-09 RX ADMIN — INSULIN ASPART 2 UNITS: 100 INJECTION, SOLUTION INTRAVENOUS; SUBCUTANEOUS at 09:06

## 2025-06-09 RX ADMIN — MANNITOL 25 G: 12.5 INJECTION, SOLUTION INTRAVENOUS at 11:06

## 2025-06-09 RX ADMIN — PROPOFOL 50 MG: 10 INJECTION, EMULSION INTRAVENOUS at 09:06

## 2025-06-09 RX ADMIN — HEPARIN SODIUM 5000 UNITS: 5000 INJECTION INTRAVENOUS; SUBCUTANEOUS at 05:06

## 2025-06-09 RX ADMIN — HEPARIN SODIUM 5000 UNITS: 5000 INJECTION INTRAVENOUS; SUBCUTANEOUS at 09:06

## 2025-06-09 RX ADMIN — MIDAZOLAM HYDROCHLORIDE 2 MG: 2 INJECTION, SOLUTION INTRAMUSCULAR; INTRAVENOUS at 09:06

## 2025-06-09 RX ADMIN — MYCOPHENOLATE MOFETIL 1000 MG: 250 CAPSULE ORAL at 02:06

## 2025-06-09 RX ADMIN — ONDANSETRON 4 MG: 2 INJECTION INTRAMUSCULAR; INTRAVENOUS at 12:06

## 2025-06-09 RX ADMIN — PROPOFOL 150 MG: 10 INJECTION, EMULSION INTRAVENOUS at 09:06

## 2025-06-09 RX ADMIN — HYDRALAZINE HYDROCHLORIDE 50 MG: 50 TABLET ORAL at 09:06

## 2025-06-09 RX ADMIN — TRAMADOL HYDROCHLORIDE 50 MG: 50 TABLET, COATED ORAL at 05:06

## 2025-06-09 RX ADMIN — LIDOCAINE HYDROCHLORIDE 100 MG: 20 INJECTION INTRAVENOUS at 09:06

## 2025-06-09 RX ADMIN — FUROSEMIDE 100 MG: 10 INJECTION, SOLUTION INTRAMUSCULAR; INTRAVENOUS at 11:06

## 2025-06-09 RX ADMIN — SODIUM CHLORIDE: 9 INJECTION, SOLUTION INTRAVENOUS at 05:06

## 2025-06-09 RX ADMIN — ROCURONIUM BROMIDE 20 MG: 10 INJECTION, SOLUTION INTRAVENOUS at 10:06

## 2025-06-09 RX ADMIN — DEXTROSE AND SODIUM CHLORIDE: 5; 450 INJECTION, SOLUTION INTRAVENOUS at 02:06

## 2025-06-09 RX ADMIN — OXYCODONE 5 MG: 5 TABLET ORAL at 02:06

## 2025-06-09 RX ADMIN — ROCURONIUM BROMIDE 20 MG: 10 INJECTION, SOLUTION INTRAVENOUS at 11:06

## 2025-06-09 RX ADMIN — HEPARIN SODIUM 5000 UNITS: 5000 INJECTION INTRAVENOUS; SUBCUTANEOUS at 02:06

## 2025-06-09 RX ADMIN — Medication 100 MCG: at 11:06

## 2025-06-09 RX ADMIN — CEFAZOLIN 2 G: 2 INJECTION, POWDER, FOR SOLUTION INTRAMUSCULAR; INTRAVENOUS at 10:06

## 2025-06-09 RX ADMIN — SODIUM CHLORIDE: 9 INJECTION, SOLUTION INTRAVENOUS at 10:06

## 2025-06-09 RX ADMIN — FENTANYL CITRATE 50 MCG: 50 INJECTION, SOLUTION INTRAMUSCULAR; INTRAVENOUS at 01:06

## 2025-06-09 RX ADMIN — Medication 1 TABLET: at 02:06

## 2025-06-09 RX ADMIN — FENTANYL CITRATE 50 MCG: 50 INJECTION, SOLUTION INTRAMUSCULAR; INTRAVENOUS at 12:06

## 2025-06-09 RX ADMIN — DOCUSATE SODIUM 100 MG: 100 CAPSULE, LIQUID FILLED ORAL at 09:06

## 2025-06-09 RX ADMIN — LABETALOL HYDROCHLORIDE 200 MG: 200 TABLET, FILM COATED ORAL at 04:06

## 2025-06-09 RX ADMIN — MUPIROCIN: 20 OINTMENT TOPICAL at 08:06

## 2025-06-09 RX ADMIN — SODIUM CHLORIDE: 9 INJECTION, SOLUTION INTRAVENOUS at 03:06

## 2025-06-09 RX ADMIN — ACETAMINOPHEN 650 MG: 325 TABLET ORAL at 02:06

## 2025-06-09 RX ADMIN — TACROLIMUS 3 MG: 1 CAPSULE ORAL at 05:06

## 2025-06-09 RX ADMIN — SODIUM CHLORIDE 175 MG: 9 INJECTION, SOLUTION INTRAVENOUS at 10:06

## 2025-06-09 RX ADMIN — MUPIROCIN 1 G: 20 OINTMENT TOPICAL at 09:06

## 2025-06-09 RX ADMIN — FENTANYL CITRATE 50 MCG: 50 INJECTION, SOLUTION INTRAMUSCULAR; INTRAVENOUS at 09:06

## 2025-06-09 RX ADMIN — ACETAMINOPHEN 650 MG: 325 TABLET ORAL at 09:06

## 2025-06-09 RX ADMIN — ROCURONIUM BROMIDE 50 MG: 10 INJECTION, SOLUTION INTRAVENOUS at 09:06

## 2025-06-09 RX ADMIN — SODIUM CHLORIDE: 9 INJECTION, SOLUTION INTRAVENOUS at 09:06

## 2025-06-09 RX ADMIN — HYDRALAZINE HYDROCHLORIDE 50 MG: 50 TABLET ORAL at 04:06

## 2025-06-09 RX ADMIN — PREGABALIN 75 MG: 75 CAPSULE ORAL at 08:06

## 2025-06-09 RX ADMIN — FAMOTIDINE 20 MG: 20 TABLET, FILM COATED ORAL at 09:06

## 2025-06-09 RX ADMIN — MYCOPHENOLATE MOFETIL 1000 MG: 250 CAPSULE ORAL at 09:06

## 2025-06-09 RX ADMIN — BISACODYL 10 MG: 5 TABLET, COATED ORAL at 09:06

## 2025-06-09 RX ADMIN — DOCUSATE SODIUM 100 MG: 100 CAPSULE, LIQUID FILLED ORAL at 02:06

## 2025-06-09 RX ADMIN — ACETAMINOPHEN 650 MG: 160 SOLUTION ORAL at 09:06

## 2025-06-09 RX ADMIN — SODIUM CHLORIDE: 9 INJECTION, SOLUTION INTRAVENOUS at 07:06

## 2025-06-09 RX ADMIN — SODIUM CHLORIDE 500 MG: 9 INJECTION, SOLUTION INTRAVENOUS at 10:06

## 2025-06-09 RX ADMIN — SODIUM CHLORIDE: 0.9 INJECTION, SOLUTION INTRAVENOUS at 09:06

## 2025-06-09 RX ADMIN — SUGAMMADEX 200 MG: 100 INJECTION, SOLUTION INTRAVENOUS at 01:06

## 2025-06-09 RX ADMIN — OXYCODONE 5 MG: 5 TABLET ORAL at 07:06

## 2025-06-09 RX ADMIN — Medication 100 MCG: at 01:06

## 2025-06-09 NOTE — ANESTHESIA PROCEDURE NOTES
Intubation    Date/Time: 6/9/2025 9:23 AM    Performed by: Lyubov Thomas CRNA  Authorized by: Wanda Cazares MD    Intubation:     Induction:  Intravenous    Intubated:  Postinduction    Mask Ventilation:  Easy mask    Attempts:  1    Attempted By:  CRNA    Method of Intubation:  Video laryngoscopy    Blade:  Roa 3    Laryngeal View Grade: Grade I - full view of cords      Difficult Airway Encountered?: No      Complications:  None    Airway Device:  Oral endotracheal tube    Airway Device Size:  7.5    Style/Cuff Inflation:  Cuffed (inflated to minimal occlusive pressure)    Inflation Amount (mL):  5    Tube secured:  22    Secured at:  The teeth    Placement Verified By:  Capnometry    Complicating Factors:  None    Findings Post-Intubation:  Atraumatic/condition of teeth unchanged and BS equal bilateral

## 2025-06-09 NOTE — TRANSFER OF CARE
"Anesthesia Transfer of Care Note    Patient: Jeffrey Moreno    Procedure(s) Performed: Procedure(s) (LRB):  TRANSPLANT, KIDNEY (N/A)  REMOVAL, CATHETER, DIALYSIS, PERITONEAL (N/A)    Patient location: PACU    Anesthesia Type: general    Transport from OR: Transported from OR on 6-10 L/min O2 by face mask with adequate spontaneous ventilation    Post pain: adequate analgesia    Post assessment: no apparent anesthetic complications    Post vital signs: stable    Level of consciousness: sedated    Nausea/Vomiting: no nausea/vomiting    Complications: none    Transfer of care protocol was followedComments: Oral airway in place.       Last vitals: Visit Vitals  BP (!) 156/94 (BP Location: Right arm, Patient Position: Lying)   Pulse 75   Temp 36.6 °C (97.9 °F) (Temporal)   Resp (!) 24   Ht 6' 1" (1.854 m)   Wt 78.2 kg (172 lb 6.4 oz)   SpO2 100%   BMI 22.75 kg/m²     "

## 2025-06-09 NOTE — ANESTHESIA PROCEDURE NOTES
Peripheral IV Insertion    Diagnosis: I99.8 Other disorder of circulatory system    Patient location during procedure: OR    Staffing  Authorizing Provider: Wanda Cazares MD  Performing Provider: Lyubov Thomas CRNA    Staffing  Performed by: Lyubov Thomas CRNA  Authorized by: Wanda Cazares MD    Anesthesiologist was present at the time of the procedure.  Peripheral IV Insertion  Skin Prep: chlorhexidine gluconate  Local Infiltration: none  Orientation: left  Location: forearm  Catheter Type: peripheral IV (single lumen)  Catheter Size: 18 G  Catheter placement by Anatomical landmarks. Heme positive aspiration all ports. Insertion Attempts: 1  Assessment  Dressing: secured with tape and tegaderm  Patient: Tolerated well  Line flushed easily.

## 2025-06-09 NOTE — OP NOTE
Operative Report    Date of Procedure: 6/9/2025  Date of Transplant (UNOS): 6/9/25    Surgeons:  Surgeons and Role:     * Grayson Mcfadden Jr., MD - Primary     * Blade Maloney MD - Fellow    First Assistant Attestation:  The indicated resident served as first assistant for this procedure.    Pre-operative Diagnosis: ESRD, requiring chronic dialysis secondary to Hypertensive Nephrosclerosis  Post-operative Diagnosis: Same    Procedure(s) Performed:   Back Table Preparation of Left Kidney     Donation after Brain Death Kidney transplant  Peritoneal Dialysis Catheter removal    Anesthesia: General endotracheal    Preamble  Indications and Patient Counseling: The patient is a 29 y.o. year-old male with end-stage kidney disease secondary to Hypertensive Nephrosclerosis who has been evaluated for a kidney transplant.  The procedure was thoroughly discussed with the patient, including potential risks, complications, and alternatives.  Specific complications mentioned included death, graft non-function, bleeding, infection, and rejection, as well as the possibility of other complications not specifically mentioned.    Donor Risk Factors: Prior to the operation, the patient was advised of any donor-specific risk factors requiring specific disclosure.  Factors in this case included nothing that required specific disclosure.      Specific [facility] donor risk criteria for the organ donor include:  None    All questions were answered, the patient voiced appropriate understanding, and he agreed to proceed with the planned procedure.    ABO Confirmation: Immediately following arrival of the donor organ and prior to implantation, a formal ABO confirmation was done according to hospital and UNOS policies.  I confirmed the UNOS ID number ([id number]) of the donor organ and the donor and recipient ABO types, directly verifying these data by comparison with the UNOS Match Run report ([id number]).  This confirmation was personally done by  an attending surgeon and circulating nurse, and is officially documented elsewhere.    Time-Out: A complete time out was carried out prior to incision, with confirmation of patient identity, correct procedure, correct operative site, appropriate antibiotic prophylaxis, review of any known allergies, and presence of all needed equipment.    Procedure in Detail  Prior to starting the operation, the left kidney  was prepared on the back table. Arterial anatomy was single. Venous anatomy was single. Ureteral anatomy was single. Back table vascular reconstruction was not required  .  Unneeded fat was removed from the kidney, the vessels were cleaned of adherent tissue and tested for leaks, and the kidney was maintained at ice temperature in organ preservation solution until it was brought to the operative field.     The patient was brought into the operating room and placed in a supine position on the OR table.  After the induction of general endotracheal anesthesia, lines were placed by the anesthesiologist.  The urinary bladder was catheterized and irrigated with antibiotic solution.  There was no tension on the axillae and all pressure points were padded.  Sequential compression boots were used as were Vane Huggers.  The abdomen was prepped and draped in the usual sterile fashion.  Skin was incised over the right with a knife and deepened with electrocautery.  The peritoneum and its contents were swept medially, exposing the right external iliac artery and the right external iliac vein.  The Bookwalter retractor was used to provide exposure.  Overlying lymphatics were ligated or cauterized and the vessels were dissected free for a length compatible with anastomosis.  The kidney was brought to the OR table at 6/9/2025 10:59 AM.  Venous control was obtained with a vascular clamp.  A venotomy was made, the vein irrigated, and an end renal to right external iliac vein anastomosis was created with 5-0 polypropylene.   Arterial control was obtained with a vascular clamp.  Arteriotomy was made, the artery irrigated, and an end renal to right external iliac artery anastomosis was created with 5-0 polypropylene.  The kidney was unclamped and reperfused at 6/9/2025 11:36 AM.  Reperfusion quality was good. Intraoperative urine production was observed.  After hemostasis was obtained, a Lich uretero-neocystostomy was created.  The bladder was filled and identified, opened, and the anastomosis created using 6-0 PDS.  The bladder muscle was closed over the distal ureter to create an antireflux tunnel.  A ureteral stent was used.  With the kidney well perfused and sitting appropriately without tension on the anastomoses, viscera were replaced in their usual position.        PD cath was removed in its entirety after making an elliptical incision around the exit site and then dissecting around each cuff.  The fascial defect was closed with 0 vicryl.      The fascia was closed in two layers and then intra-op US was done which demonstrated satisfactory flow.     The wound was closed after a final check for hemostasis.  Overall, the graft quality was assessed to be good. At the end of the case the needle, sponge and instrument counts were all correct.  Sterile dressings were applied and the patient was brought to the recovery room/ICU in good condition.        Estimated Blood Loss: 100 mL  Fluids Administered:   Crystalloid (mL) 2,000      Drains: 19f Dhruv drains x 1  Specimens: none    Findings:    Organ Transplanted: Left Kidney    Arterial Anatomy: single  Number of Arteries: 1  Configuration of Multiple Arteries: not applicable  Venous Anatomy: single  Number of Veins: 1  Ureteral Anatomy: single  Number of Ureters: 1  Reperfusion Quality: good  Overall Graft Quality: good  Intraoperative Urine Production: yes  Sullivan: not to be removed before 2 days.  Ureteral Stent: Yes    Ischemic Times:   Anastomosis (warm ischemia) time: 37 minutes    Cold ischemia time: 724 minutes  Total ischemia time: 761 minutes    Donor Data:  UNOS ID: [id number]    [facility] Match Run: [id number]    Donor Type: Donation after Brain Death    Donor CMV Status: Negative    Donor HBcAB: Negative    Donor HCV Status: Negative

## 2025-06-09 NOTE — ASSESSMENT & PLAN NOTE
- ESRD 2/2 presumed HTN  - Primary for kidney txp 6/9  - Pre op labs and diagnostics pending, will be reviewed prior to surgery.  - Tentative OR time 0900 with Dr. Mcfadden as primary surgeon.

## 2025-06-09 NOTE — H&P
[facility] - Transplant Step[facility]  Kidney Transplant  H&P      Subjective:     Chief Complaint/Reason for Admission: ESRD, Kidney Txp    History of Present Illness:  Mr. Moreno is a 29 y.o. year old Black or  male with ZAVALETA (saw hepatology 4/3, see note) and ESRD secondary to presumed HTN, no renal biopsy.  Initially started on HD 12/31/2023 for about 3 months before transitioning to home peritoneal dialysis. Currently doing cyclical PD, no peritonitis or exit site infections. Native UOP ~500 ml/day. Pre op labs and diagnostics in process, will be reviewed prior to surgery. Tentative OR time 0900 with Dr. Mcfadden.       Dialysis History: Mr. Murphy with ESRD, requiring chronic dialysis who is on peritoneal dialysis started on 12/21/23 (HD x 3 months then PD). Patient is dialyzing on cyclic peritoneal dialysis.  Patient reports that he is tolerating dialysis well.     Native urine output per day: ~500 mL    Previous Transplant: no    PTA Medications   Medication Sig    calcium acetate,phosphat bind, (PHOSLO) 667 mg capsule Take 1,334 mg by mouth 3 (three) times daily with meals. (Patient not taking: Reported on 4/3/2025)    folic acid (FOLVITE) 1 MG tablet Take 1 tablet by mouth.    gabapentin (NEURONTIN) 100 MG capsule Take 100 mg by mouth.    gentamicin (GARAMYCIN) 0.1 % cream Apply 1 Application topically once daily.    hydrALAZINE (APRESOLINE) 25 MG tablet Take 100 mg by mouth every 8 (eight) hours.    labetaloL (NORMODYNE) 200 MG tablet Take 400 mg by mouth 2 (two) times daily.    losartan (COZAAR) 50 MG tablet Take 50 mg by mouth once daily.    minoxidiL (LONITEN) 2.5 MG tablet Take 3 tablets by mouth.    NIFEdipine (ADALAT CC) 90 MG TbSR Take 90 mg by mouth once daily. (Patient not taking: Reported on 4/3/2025)    pantoprazole (PROTONIX) 40 MG tablet Take 40 mg by mouth once daily. (Patient not taking: Reported on 4/3/2025)       Review of patient's allergies indicates:  No Known  "Allergies    Past Medical History:   Diagnosis Date    Anemia     Disorder of kidney and ureter     Enteritis     Hypertension     LVH (left ventricular hypertrophy)     ZAVALETA (nonalcoholic steatohepatitis)     Prostatitis     Secondary hyperparathyroidism      Past Surgical History:   Procedure Laterality Date    INSERTION OF DIALYSIS CATHETER      PERITONEAL CATHETER INSERTION       Family History       Problem Relation (Age of Onset)    Heart disease Sister    Hypertension Mother, Maternal Grandmother          Tobacco Use    Smoking status: Never    Smokeless tobacco: Never   Substance and Sexual Activity    Alcohol use: Not Currently    Drug use: Never    Sexual activity: Not Currently        Review of Systems   Constitutional:  Negative for appetite change, chills and fever.   HENT:  Negative for facial swelling and trouble swallowing.    Eyes:  Negative for photophobia and pain.   Respiratory:  Negative for cough, shortness of breath, wheezing and stridor.    Cardiovascular:  Negative for chest pain, palpitations and leg swelling.   Gastrointestinal:  Negative for abdominal distention, abdominal pain, constipation, diarrhea, nausea and vomiting.   Genitourinary:  Negative for difficulty urinating and dysuria.   Musculoskeletal:  Negative for neck pain and neck stiffness.   Skin:  Negative for wound.   Neurological:  Negative for dizziness, weakness and light-headedness.   Psychiatric/Behavioral:  Negative for agitation, behavioral problems, confusion and decreased concentration.      Objective:     Vital Signs (Most Recent):  Temp: 99.2 °F (37.3 °C) (06/09/25 0432)  Pulse: 85 (06/09/25 0432)  Resp: 18 (06/09/25 0432)  BP: (!) 167/108 (06/09/25 0432)  SpO2: 98 % (06/09/25 0432)  Height: 6' 1" (185.4 cm)  Weight: 78.2 kg (172 lb 6.4 oz)  Body mass index is 22.75 kg/m².      Physical Exam  Vitals and nursing note reviewed.   Constitutional:       General: He is not in acute distress.  HENT:      Head: " Normocephalic and atraumatic.      Nose: Nose normal.   Eyes:      General: No scleral icterus.        Right eye: No discharge.   Cardiovascular:      Rate and Rhythm: Normal rate and regular rhythm.      Pulses: Normal pulses.   Pulmonary:      Effort: Pulmonary effort is normal. No respiratory distress.      Breath sounds: No wheezing or rales.   Abdominal:      General: Bowel sounds are normal. There is no distension.      Tenderness: There is no abdominal tenderness. There is no guarding.      Comments: Left PD catheter no s/s/i   Musculoskeletal:      Right lower leg: No edema.      Left lower leg: No edema.   Skin:     General: Skin is warm and dry.      Capillary Refill: Capillary refill takes less than 2 seconds.   Neurological:      General: No focal deficit present.      Mental Status: He is alert and oriented to person, place, and time.   Psychiatric:         Mood and Affect: Mood normal.         Behavior: Behavior normal.         Thought Content: Thought content normal.          Laboratory  pending    Diagnostic Results:  CXR and EKG to be reviewed prior to surgery  Assessment/Plan:     Cardiac/Vascular  Essential hypertension  - Give home BB pre op  - Resume home meds post op as appropriate.      Renal/  * ESRD (end stage renal disease) on dialysis  - ESRD 2/2 presumed HTN  - Primary for kidney txp 6/9  - Pre op labs and diagnostics pending, will be reviewed prior to surgery.  - Tentative OR time 0900 with Dr. Mcfadden as primary surgeon.            The patient presents for kidney transplant.  There are no apparent contraindications to proceeding with the planned transplant.  The patient understands that the transplant could potentially be cancelled pending detailed assessment of the donor organ.  He will receive Thymoglobulin induction.  A complete discussion of the transplant procedure, including risks, complications, and alternatives, as well as any donor-specific risk factors requiring specific  disclosure, will be carried out by the responsible staff surgeon prior to the procedure.         Medical decision making for this encounter includes review of pertinent labs and diagnostic studies, assessment and planning, discussions with consulting providers, discussion with patient/family, and participation in multidisciplinary rounds. Time spent caring for patient: 60 minutes    Genny Jefferson PA-C  Kidney Transplant  [facility] - [facility] [place]

## 2025-06-09 NOTE — ANESTHESIA PREPROCEDURE EVALUATION
[facility]  Anesthesia Pre-Operative Evaluation        Patient Name: Jeffrey Moreno  YOB: 1995  MRN: [id number]    SUBJECTIVE:     Pre-operative Evaluation for Procedure(s) (LRB):  TRANSPLANT, KIDNEY (N/A)     06/09/2025    Jeffrey Moreno is a 29 y.o. male with a PMHx significant for HTN, ZAVALETA, and ESRD on PD.     He now presents for the above procedure(s).    Previous Airway: None documented       LDA:        Peripheral IV - Single Lumen 06/09/25 0511 20 G Anterior;Right Forearm (Active)   Number of days: 0       Problem List[1]    Review of patient's allergies indicates:  No Known Allergies    Current Outpatient Medications   Medication Instructions    calcium acetate(phosphat bind) (PHOSLO) 1,334 mg, 3 times daily with meals    folic acid (FOLVITE) 1 MG tablet 1 tablet    gabapentin (NEURONTIN) 100 mg    gentamicin (GARAMYCIN) 0.1 % cream 1 Application, Daily    hydrALAZINE (APRESOLINE) 100 mg, Every 8 hours    labetaloL (NORMODYNE) 400 mg, 2 times daily    losartan (COZAAR) 50 mg, Daily    minoxidiL (LONITEN) 2.5 MG tablet 3 tablets    NIFEdipine (ADALAT CC) 90 mg, Daily    pantoprazole (PROTONIX) 40 mg, Daily       Past Surgical History:   Procedure Laterality Date    INSERTION OF DIALYSIS CATHETER      PERITONEAL CATHETER INSERTION         Social History     Substance and Sexual Activity   Drug Use Never     Alcohol Use: Not At Risk (2/13/2025)    AUDIT-C     Frequency of Alcohol Consumption: Never     Average Number of Drinks: Patient does not drink     Frequency of Binge Drinking: Never     Tobacco Use: Low Risk  (4/14/2025)    Received from [facility]    Patient History     Smoking Tobacco Use: Never     Smokeless Tobacco Use: Never     Passive Exposure: Not on file       OBJECTIVE:     Vital Signs Range (Last 24H):  Temp:  [37.3 °C (99.2 °F)]   Pulse:  [85]   Resp:  [18]   BP: (167)/(108)   SpO2:  [98 %]       Significant Labs    Heme  Profile  Lab Results   Component Value Date    WBC 4.80 08/20/2024    HGB 10.1 (L) 08/20/2024    HCT 30.0 (L) 08/20/2024     08/20/2024       Coagulation Studies  Lab Results   Component Value Date    LABPROT 12.9 12/12/2024    INR 1 12/12/2024    APTT 23.0 08/20/2024       BMP  Lab Results   Component Value Date     12/12/2024    K 3.9 04/14/2025    CL 99 12/12/2024    CO2 28 12/12/2024    BUN 61 (H) 12/12/2024    CREATININE 18.52 (HH) 12/12/2024    PHOS 8.1 (H) 08/20/2024       Liver Function Tests  Lab Results   Component Value Date    AST 32 08/20/2024    ALT 41 08/20/2024    ALKPHOS 53 (L) 08/20/2024    BILITOT 0.5 08/20/2024    PROT 6.9 08/20/2024    ALBUMIN 3.6 08/20/2024       Lipid Profile  Lab Results   Component Value Date    CHOL 161 08/20/2024    HDL 42 08/20/2024    TRIG 141 08/20/2024       Endocrine Profile  Lab Results   Component Value Date    HGBA1C 4.4 01/29/2025    TSH 1.878 03/11/2025         Cardiac Studies    EKG:   No results found for this or any previous visit.    ASSESSMENT/PLAN:         Pre-op Assessment    I have reviewed the Patient Summary Reports.     I have reviewed the Nursing Notes. I have reviewed the NPO Status.   I have reviewed the Medications.     Review of Systems  Anesthesia Hx:  No problems with previous Anesthesia               Denies Personal Hx of Anesthesia complications.                    Cardiovascular:     Hypertension                       Shortness of Breath                    Hypertension         Pulmonary:      Shortness of breath                  Renal/:  Chronic Renal Disease        Kidney Function/Disease             Hepatic/GI:      Liver Disease, Hepatitis           Liver Disease, Hepatitis        Neurological:      Headaches      Dx of Headaches                               Physical Exam  General: Well nourished, Cooperative and Alert    Airway:  Mallampati: II   Mouth Opening: Normal  TM Distance: Normal  Tongue: Normal  Neck ROM: Normal  ROM    Dental:  Intact        Anesthesia Plan  Type of Anesthesia, risks & benefits discussed:    Anesthesia Type: Gen ETT  Intra-op Monitoring Plan: Standard ASA Monitors and Art Line  Post Op Pain Control Plan: multimodal analgesia and IV/PO Opioids PRN  Induction:  IV  Airway Plan: Direct, Post-Induction  Informed Consent: Informed consent signed with the Patient and all parties understand the risks and agree with anesthesia plan.  All questions answered.   ASA Score: 3  Day of Surgery Review of History & Physical: H&P Update referred to the surgeon/provider.    Ready For Surgery From Anesthesia Perspective.     .           [1]   Patient Active Problem List  Diagnosis    Anemia of chronic disease    Essential hypertension    ZAVALETA (nonalcoholic steatohepatitis)    Secondary hyperparathyroidism    Deficiency of other vitamins    Pre-transplant evaluation for kidney transplant    Hepatic steatosis    Elevated liver enzymes    Acute renal failure    Allergy, unspecified, initial encounter    Anaphylactic shock, unspecified, initial encounter    Chest pain, unspecified    Cloudy dialysis effluent    Cramp and spasm    Dependence on renal dialysis    Diarrhea, unspecified    Encounter for antibody response examination    Enteritis    ESRD (end stage renal disease) on dialysis    Fever, unspecified    Gastritis, unspecified, without bleeding    Headache, unspecified    Hypertensive chronic kidney disease with stage 5 chronic kidney disease or end stage renal disease    Hypervolemia    Hypotension, unspecified    Infection and inflammatory reaction due to peritoneal dialysis catheter, initial encounter    Intractable abdominal pain    Iron deficiency anemia, unspecified    LVH (left ventricular hypertrophy) due to hypertensive disease, with heart failure    Nausea with vomiting, unspecified    Noninfective gastroenteritis and colitis, unspecified    Other disorders of magnesium metabolism    Other disorders of phosphorus  metabolism    Pain, unspecified    Pruritus, unspecified    Severe uncontrolled hypertension    Shortness of breath    Stage 3a chronic kidney disease    Unspecified protein-calorie malnutrition    Uremia    Low ceruloplasmin level    Abnormal findings on diagnostic imaging of liver

## 2025-06-09 NOTE — ANESTHESIA POSTPROCEDURE EVALUATION
Anesthesia Post Evaluation    Patient: Jeffrey Moreno    Procedure(s) Performed: Procedure(s) (LRB):  TRANSPLANT, KIDNEY (N/A)  REMOVAL, CATHETER, DIALYSIS, PERITONEAL (N/A)    Final Anesthesia Type: general      Patient location during evaluation: PACU  Patient participation: No - Unable to Participate, Sedation  Level of consciousness: sedated  Post-procedure vital signs: reviewed and stable  Pain management: adequate  Airway patency: patent    PONV status at discharge: No PONV  Anesthetic complications: no      Cardiovascular status: hemodynamically stable  Respiratory status: unassisted and spontaneous ventilation  Hydration status: euvolemic  Follow-up not needed.          Vitals Value Taken Time   /64 06/09/25 14:01   Pulse 73 06/09/25 14:06   Resp 21 06/09/25 14:06   SpO2 100 % 06/09/25 14:06   Vitals shown include unfiled device data.      No case tracking events are documented in the log.      Pain/Domingo Score: No data recorded

## 2025-06-09 NOTE — HPI
Mr. Moreno is a 29 y.o. year old Black or  male with ZAVALETA (saw hepatology 4/3, see note) and ESRD secondary to presumed HTN, no renal biopsy.  Initially started on HD 12/31/2023 for about 3 months before transitioning to home peritoneal dialysis. Currently doing cyclical PD, no peritonitis or exit site infections. Native UOP ~500 ml/day. Pre op labs and diagnostics in process, will be reviewed prior to surgery. Tentative OR time 0900 with Dr. Mcfadden.

## 2025-06-09 NOTE — NURSING
Pt off floor to [facility] at this time. He left via wheelchair with pt escort.

## 2025-06-09 NOTE — NURSING TRANSFER
Nursing Transfer Note      6/9/2025   4:33 PM    Nurse giving handoff:zan ramos  Nurse receiving handoff:zan marcano    Reason patient is being transferred: per md order    Transfer To: [id number]    Transfer via bed    Transfer with iv pump    Transported by pct x2    Order for Tele Monitor? No    Additional Lines: Sullivan Catheter    Medicines sent: ivf and thymo infusing    Any special needs or follow-up needed: n/a    Patient belongings transferred with patient: No    Chart send with patient: Yes    Notified: mother

## 2025-06-09 NOTE — SUBJECTIVE & OBJECTIVE
Subjective:     Chief Complaint/Reason for Admission: ESRD, Kidney Txp    History of Present Illness:  Mr. Moreno is a 29 y.o. year old Black or  male with ZAVALETA (saw hepatology 4/3, see note) and ESRD secondary to presumed HTN, no renal biopsy.  Initially started on HD 12/31/2023 for about 3 months before transitioning to home peritoneal dialysis. Currently doing cyclical PD, no peritonitis or exit site infections. Native UOP ~500 ml/day. Pre op labs and diagnostics in process, will be reviewed prior to surgery. Tentative OR time 0900 with Dr. Mcfadden.       Dialysis History: Mr. Murphy with ESRD, requiring chronic dialysis who is on peritoneal dialysis started on 12/21/23 (HD x 3 months then PD). Patient is dialyzing on cyclic peritoneal dialysis.  Patient reports that he is tolerating dialysis well.     Native urine output per day: ~500 mL    Previous Transplant: no    PTA Medications   Medication Sig    calcium acetate,phosphat bind, (PHOSLO) 667 mg capsule Take 1,334 mg by mouth 3 (three) times daily with meals. (Patient not taking: Reported on 4/3/2025)    folic acid (FOLVITE) 1 MG tablet Take 1 tablet by mouth.    gabapentin (NEURONTIN) 100 MG capsule Take 100 mg by mouth.    gentamicin (GARAMYCIN) 0.1 % cream Apply 1 Application topically once daily.    hydrALAZINE (APRESOLINE) 25 MG tablet Take 100 mg by mouth every 8 (eight) hours.    labetaloL (NORMODYNE) 200 MG tablet Take 400 mg by mouth 2 (two) times daily.    losartan (COZAAR) 50 MG tablet Take 50 mg by mouth once daily.    minoxidiL (LONITEN) 2.5 MG tablet Take 3 tablets by mouth.    NIFEdipine (ADALAT CC) 90 MG TbSR Take 90 mg by mouth once daily. (Patient not taking: Reported on 4/3/2025)    pantoprazole (PROTONIX) 40 MG tablet Take 40 mg by mouth once daily. (Patient not taking: Reported on 4/3/2025)       Review of patient's allergies indicates:  No Known Allergies    Past Medical History:   Diagnosis Date    Anemia     Disorder  "of kidney and ureter     Enteritis     Hypertension     LVH (left ventricular hypertrophy)     ZAVALETA (nonalcoholic steatohepatitis)     Prostatitis     Secondary hyperparathyroidism      Past Surgical History:   Procedure Laterality Date    INSERTION OF DIALYSIS CATHETER      PERITONEAL CATHETER INSERTION       Family History       Problem Relation (Age of Onset)    Heart disease Sister    Hypertension Mother, Maternal Grandmother          Tobacco Use    Smoking status: Never    Smokeless tobacco: Never   Substance and Sexual Activity    Alcohol use: Not Currently    Drug use: Never    Sexual activity: Not Currently        Review of Systems   Constitutional:  Negative for appetite change, chills and fever.   HENT:  Negative for facial swelling and trouble swallowing.    Eyes:  Negative for photophobia and pain.   Respiratory:  Negative for cough, shortness of breath, wheezing and stridor.    Cardiovascular:  Negative for chest pain, palpitations and leg swelling.   Gastrointestinal:  Negative for abdominal distention, abdominal pain, constipation, diarrhea, nausea and vomiting.   Genitourinary:  Negative for difficulty urinating and dysuria.   Musculoskeletal:  Negative for neck pain and neck stiffness.   Skin:  Negative for wound.   Neurological:  Negative for dizziness, weakness and light-headedness.   Psychiatric/Behavioral:  Negative for agitation, behavioral problems, confusion and decreased concentration.      Objective:     Vital Signs (Most Recent):  Temp: 99.2 °F (37.3 °C) (06/09/25 0432)  Pulse: 85 (06/09/25 0432)  Resp: 18 (06/09/25 0432)  BP: (!) 167/108 (06/09/25 0432)  SpO2: 98 % (06/09/25 0432)  Height: 6' 1" (185.4 cm)  Weight: 78.2 kg (172 lb 6.4 oz)  Body mass index is 22.75 kg/m².      Physical Exam  Vitals and nursing note reviewed.   Constitutional:       General: He is not in acute distress.  HENT:      Head: Normocephalic and atraumatic.      Nose: Nose normal.   Eyes:      General: No scleral " icterus.        Right eye: No discharge.   Cardiovascular:      Rate and Rhythm: Normal rate and regular rhythm.      Pulses: Normal pulses.   Pulmonary:      Effort: Pulmonary effort is normal. No respiratory distress.      Breath sounds: No wheezing or rales.   Abdominal:      General: Bowel sounds are normal. There is no distension.      Tenderness: There is no abdominal tenderness. There is no guarding.      Comments: Left PD catheter no s/s/i   Musculoskeletal:      Right lower leg: No edema.      Left lower leg: No edema.   Skin:     General: Skin is warm and dry.      Capillary Refill: Capillary refill takes less than 2 seconds.   Neurological:      General: No focal deficit present.      Mental Status: He is alert and oriented to person, place, and time.   Psychiatric:         Mood and Affect: Mood normal.         Behavior: Behavior normal.         Thought Content: Thought content normal.          Laboratory  pending    Diagnostic Results:  CXR and EKG to be reviewed prior to surgery

## 2025-06-09 NOTE — ANESTHESIA PROCEDURE NOTES
Arterial    Diagnosis: esrd    Patient location during procedure: done in OR    Staffing  Authorizing Provider: Wanda Cazares MD  Performing Provider: Wanda Cazares MD    Staffing  Performed by: Wanda Cazares MD  Authorized by: Wanda Cazares MD    Anesthesiologist was present at the time of the procedure.    Preanesthetic Checklist  Completed: patient identified, IV checked, site marked, risks and benefits discussed, surgical consent, monitors and equipment checked, pre-op evaluation, timeout performed and anesthesia consent givenArterial  Skin Prep: chlorhexidine gluconate  Local Infiltration: none  Orientation: left  Location: radial    Catheter Size: 20 G  Catheter placement by Ultrasound guidance. Heme positive aspiration all ports.   Vessel Caliber: patent  Needle advanced into vessel with real time Ultrasound guidance.Insertion Attempts: 1  Assessment  Dressing: secured with tape and tegaderm  Patient: Tolerated well

## 2025-06-09 NOTE — PLAN OF CARE
Oriented to setting. VSS on room air. Independent and ambulatory. Provider JOSEFINA Jefferson notified of pt arrival. EKG, chest xray, labs done. Anesthesia consent signed. HD nurse collected fluid from PD cath. Mother at bedside, primary contact.     Pt updated on plan of care. Plan for K tx at 0900. NPO since 2100.     Bed locked, lowered. Call light within reach.

## 2025-06-09 NOTE — NURSING
Received pt from PACU. Call bell placed within reach. Family at bedside. Pt mostly sleeping between care.

## 2025-06-09 NOTE — ANESTHESIA PROCEDURE NOTES
Trialysis Catheter    Diagnosis: ESRD  Patient location during procedure: done in OR  Procedure Urgency: Routine      Staffing  Authorizing Provider: Wanda Cazares MD  Performing Provider: Wanda Cazares MD    Staffing  Performed by: Wanda Cazares MD  Authorized by: Wanda Cazares MD    Anesthesiologist was present at the time of the procedure.  Preanesthetic Checklist  Completed: patient identified, IV checked, site marked, risks and benefits discussed, surgical consent, monitors and equipment checked, pre-op evaluation, timeout performed and anesthesia consent given  Indication   Indication: hemodialysis, med administration, vascular access, hemodynamic monitoring     Anesthesia   general anesthesia    Central Line   Skin Prep: skin prepped with ChloraPrep, skin prep agent completely dried prior to procedure  Sterile Barriers Followed: Yes    All five maximal barriers used- gloves, gown, cap, mask, and large sterile sheet    hand hygiene performed prior to central venous catheter insertion  Location: right internal jugular.   Catheter type: dialysis  Catheter Size: 12 Fr  Ultrasound: vascular probe with ultrasound   Vessel Caliber: large, patent, compressibility normal  Needle advanced into vessel with real time Ultrasound guidance.  sterile gel and probe cover used in ultrasound-guided central venous catheter insertion  Manometry: none  Insertion Attempts: 1   Securement:line sutured, chlorhexidine patch, sterile dressing applied and blood return through all ports    Post-Procedure    Adverse Events:none      Guidewire Guidewire removed intact.

## 2025-06-10 DIAGNOSIS — Z94.0 KIDNEY REPLACED BY TRANSPLANT: Primary | ICD-10-CM

## 2025-06-10 PROBLEM — R10.9 INTRACTABLE ABDOMINAL PAIN: Status: RESOLVED | Noted: 2024-04-29 | Resolved: 2025-06-10

## 2025-06-10 PROBLEM — Z01.818 PRE-TRANSPLANT EVALUATION FOR KIDNEY TRANSPLANT: Status: RESOLVED | Noted: 2024-08-20 | Resolved: 2025-06-10

## 2025-06-10 PROBLEM — Z79.61: Status: ACTIVE | Noted: 2025-06-10

## 2025-06-10 PROBLEM — R06.02 SHORTNESS OF BREATH: Status: RESOLVED | Noted: 2024-06-14 | Resolved: 2025-06-10

## 2025-06-10 PROBLEM — R11.2 NAUSEA WITH VOMITING, UNSPECIFIED: Status: RESOLVED | Noted: 2024-06-14 | Resolved: 2025-06-10

## 2025-06-10 PROBLEM — N19 UREMIA: Status: RESOLVED | Noted: 2023-12-31 | Resolved: 2025-06-10

## 2025-06-10 PROBLEM — Z29.89 NEED FOR PROPHYLACTIC IMMUNOTHERAPY: Status: ACTIVE | Noted: 2025-06-10

## 2025-06-10 PROBLEM — Z91.89 AT RISK FOR OPPORTUNISTIC INFECTIONS: Status: ACTIVE | Noted: 2025-06-10

## 2025-06-10 PROBLEM — R50.9 FEVER, UNSPECIFIED: Status: RESOLVED | Noted: 2024-06-14 | Resolved: 2025-06-10

## 2025-06-10 PROBLEM — L29.9 PRURITUS, UNSPECIFIED: Status: RESOLVED | Noted: 2024-06-14 | Resolved: 2025-06-10

## 2025-06-10 LAB
ABSOLUTE EOSINOPHIL (OHS): 0.02 K/UL
ABSOLUTE EOSINOPHIL (OHS): 0.06 K/UL
ABSOLUTE MONOCYTE (OHS): 0.17 K/UL (ref 0.3–1)
ABSOLUTE MONOCYTE (OHS): 0.59 K/UL (ref 0.3–1)
ABSOLUTE NEUTROPHIL COUNT (OHS): 8.23 K/UL (ref 1.8–7.7)
ABSOLUTE NEUTROPHIL COUNT (OHS): 8.35 K/UL (ref 1.8–7.7)
ALBUMIN SERPL BCP-MCNC: 3 G/DL (ref 3.5–5.2)
ALBUMIN SERPL BCP-MCNC: 3 G/DL (ref 3.5–5.2)
ANION GAP (OHS): 8 MMOL/L (ref 8–16)
ANION GAP (OHS): 8 MMOL/L (ref 8–16)
BASOPHILS # BLD AUTO: 0.01 K/UL
BASOPHILS # BLD AUTO: 0.03 K/UL
BASOPHILS NFR BLD AUTO: 0.1 %
BASOPHILS NFR BLD AUTO: 0.3 %
BUN SERPL-MCNC: 50 MG/DL (ref 6–20)
BUN SERPL-MCNC: 60 MG/DL (ref 6–20)
CALCIUM SERPL-MCNC: 8.2 MG/DL (ref 8.7–10.5)
CALCIUM SERPL-MCNC: 8.3 MG/DL (ref 8.7–10.5)
CHLORIDE SERPL-SCNC: 111 MMOL/L (ref 95–110)
CHLORIDE SERPL-SCNC: 113 MMOL/L (ref 95–110)
CLASS I ANTIBODIES - LUMINEX: NORMAL
CLASS I ANTIBODY COMMENTS - LUMINEX: NORMAL
CLASS II ANTIBODY COMMENTS - LUMINEX: NORMAL
CO2 SERPL-SCNC: 20 MMOL/L (ref 23–29)
CO2 SERPL-SCNC: 22 MMOL/L (ref 23–29)
CPRA %: 30
CREAT SERPL-MCNC: 6 MG/DL (ref 0.5–1.4)
CREAT SERPL-MCNC: 9 MG/DL (ref 0.5–1.4)
CREAT UR-MCNC: 111 MG/DL (ref 23–375)
ERYTHROCYTE [DISTWIDTH] IN BLOOD BY AUTOMATED COUNT: 14.9 % (ref 11.5–14.5)
ERYTHROCYTE [DISTWIDTH] IN BLOOD BY AUTOMATED COUNT: 15 % (ref 11.5–14.5)
GFR SERPLBLD CREATININE-BSD FMLA CKD-EPI: 12 ML/MIN/1.73/M2
GFR SERPLBLD CREATININE-BSD FMLA CKD-EPI: 8 ML/MIN/1.73/M2
GLUCOSE SERPL-MCNC: 117 MG/DL (ref 70–110)
GLUCOSE SERPL-MCNC: 123 MG/DL (ref 70–110)
HCT VFR BLD AUTO: 30.6 % (ref 40–54)
HCT VFR BLD AUTO: 31.9 % (ref 40–54)
HGB BLD-MCNC: 10.1 GM/DL (ref 14–18)
HGB BLD-MCNC: 9.9 GM/DL (ref 14–18)
IMM GRANULOCYTES # BLD AUTO: 0.03 K/UL (ref 0–0.04)
IMM GRANULOCYTES # BLD AUTO: 0.05 K/UL (ref 0–0.04)
IMM GRANULOCYTES NFR BLD AUTO: 0.3 % (ref 0–0.5)
IMM GRANULOCYTES NFR BLD AUTO: 0.6 % (ref 0–0.5)
LYMPHOCYTES # BLD AUTO: 0.04 K/UL (ref 1–4.8)
LYMPHOCYTES # BLD AUTO: 0.08 K/UL (ref 1–4.8)
MAGNESIUM SERPL-MCNC: 2.4 MG/DL (ref 1.6–2.6)
MCH RBC QN AUTO: 27.7 PG (ref 27–31)
MCH RBC QN AUTO: 27.9 PG (ref 27–31)
MCHC RBC AUTO-ENTMCNC: 31.7 G/DL (ref 32–36)
MCHC RBC AUTO-ENTMCNC: 32.4 G/DL (ref 32–36)
MCV RBC AUTO: 86 FL (ref 82–98)
MCV RBC AUTO: 88 FL (ref 82–98)
NUCLEATED RBC (/100WBC) (OHS): 0 /100 WBC
NUCLEATED RBC (/100WBC) (OHS): 0 /100 WBC
PHOSPHATE SERPL-MCNC: 3.8 MG/DL (ref 2.7–4.5)
PHOSPHATE SERPL-MCNC: 4.2 MG/DL (ref 2.7–4.5)
PLATELET # BLD AUTO: 250 K/UL (ref 150–450)
PLATELET # BLD AUTO: 254 K/UL (ref 150–450)
PMV BLD AUTO: 9.5 FL (ref 9.2–12.9)
PMV BLD AUTO: 9.7 FL (ref 9.2–12.9)
POCT GLUCOSE: 103 MG/DL (ref 70–110)
POCT GLUCOSE: 131 MG/DL (ref 70–110)
POCT GLUCOSE: 134 MG/DL (ref 70–110)
POTASSIUM SERPL-SCNC: 3.9 MMOL/L (ref 3.5–5.1)
POTASSIUM SERPL-SCNC: 4 MMOL/L (ref 3.5–5.1)
PROT UR-MCNC: 93 MG/DL
PROT/CREAT UR: 0.84 MG/G{CREAT}
RBC # BLD AUTO: 3.55 M/UL (ref 4.6–6.2)
RBC # BLD AUTO: 3.64 M/UL (ref 4.6–6.2)
RELATIVE EOSINOPHIL (OHS): 0.2 %
RELATIVE EOSINOPHIL (OHS): 0.7 %
RELATIVE LYMPHOCYTE (OHS): 0.5 % (ref 18–48)
RELATIVE LYMPHOCYTE (OHS): 0.9 % (ref 18–48)
RELATIVE MONOCYTE (OHS): 2 % (ref 4–15)
RELATIVE MONOCYTE (OHS): 6.5 % (ref 4–15)
RELATIVE NEUTROPHIL (OHS): 91 % (ref 38–73)
RELATIVE NEUTROPHIL (OHS): 96.9 % (ref 38–73)
SERUM COLLECTION DT - LUMINEX CLASS I: NORMAL
SERUM COLLECTION DT - LUMINEX CLASS II: NORMAL
SODIUM SERPL-SCNC: 141 MMOL/L (ref 136–145)
SODIUM SERPL-SCNC: 141 MMOL/L (ref 136–145)
SPCL1 TESTING DATE: NORMAL
SPCL2 TESTING DATE: NORMAL
SPLUA TESTING DATE: NORMAL
TACROLIMUS BLD-MCNC: <2 NG/ML (ref 5–15)
WBC # BLD AUTO: 8.62 K/UL (ref 3.9–12.7)
WBC # BLD AUTO: 9.04 K/UL (ref 3.9–12.7)

## 2025-06-10 PROCEDURE — 20600001 HC STEP DOWN PRIVATE ROOM

## 2025-06-10 PROCEDURE — 25000003 PHARM REV CODE 250: Performed by: NURSE PRACTITIONER

## 2025-06-10 PROCEDURE — 83735 ASSAY OF MAGNESIUM: CPT | Performed by: TRANSPLANT SURGERY

## 2025-06-10 PROCEDURE — 82040 ASSAY OF SERUM ALBUMIN: CPT | Performed by: TRANSPLANT SURGERY

## 2025-06-10 PROCEDURE — 84156 ASSAY OF PROTEIN URINE: CPT | Performed by: NURSE PRACTITIONER

## 2025-06-10 PROCEDURE — 25000003 PHARM REV CODE 250: Performed by: PHYSICIAN ASSISTANT

## 2025-06-10 PROCEDURE — 63600175 PHARM REV CODE 636 W HCPCS: Performed by: NURSE PRACTITIONER

## 2025-06-10 PROCEDURE — 80197 ASSAY OF TACROLIMUS: CPT | Performed by: TRANSPLANT SURGERY

## 2025-06-10 PROCEDURE — 85025 COMPLETE CBC W/AUTO DIFF WBC: CPT | Performed by: NURSE PRACTITIONER

## 2025-06-10 PROCEDURE — 25000003 PHARM REV CODE 250: Performed by: TRANSPLANT SURGERY

## 2025-06-10 PROCEDURE — 63600175 PHARM REV CODE 636 W HCPCS: Performed by: TRANSPLANT SURGERY

## 2025-06-10 PROCEDURE — 85025 COMPLETE CBC W/AUTO DIFF WBC: CPT | Performed by: TRANSPLANT SURGERY

## 2025-06-10 PROCEDURE — 82040 ASSAY OF SERUM ALBUMIN: CPT | Performed by: NURSE PRACTITIONER

## 2025-06-10 PROCEDURE — 99233 SBSQ HOSP IP/OBS HIGH 50: CPT | Mod: 24,,, | Performed by: NURSE PRACTITIONER

## 2025-06-10 PROCEDURE — 27000207 HC ISOLATION

## 2025-06-10 RX ORDER — TACROLIMUS 1 MG/1
6 CAPSULE ORAL EVERY 12 HOURS
Qty: 360 CAPSULE | Refills: 11 | Status: SHIPPED | OUTPATIENT
Start: 2025-06-10 | End: 2025-06-11

## 2025-06-10 RX ORDER — PREDNISONE 5 MG/1
TABLET ORAL
Qty: 75 TABLET | Refills: 5 | Status: SHIPPED | OUTPATIENT
Start: 2025-06-11 | End: 2025-06-11

## 2025-06-10 RX ORDER — LABETALOL 200 MG/1
200 TABLET, FILM COATED ORAL 2 TIMES DAILY
Qty: 60 TABLET | Refills: 5 | Status: SHIPPED | OUTPATIENT
Start: 2025-06-10 | End: 2025-06-11

## 2025-06-10 RX ORDER — METHOCARBAMOL 500 MG/1
500 TABLET, FILM COATED ORAL 3 TIMES DAILY
Status: DISCONTINUED | OUTPATIENT
Start: 2025-06-10 | End: 2025-06-10

## 2025-06-10 RX ORDER — METHOCARBAMOL 500 MG/1
500 TABLET, FILM COATED ORAL 4 TIMES DAILY
Status: DISCONTINUED | OUTPATIENT
Start: 2025-06-10 | End: 2025-06-11 | Stop reason: HOSPADM

## 2025-06-10 RX ORDER — NIFEDIPINE 30 MG/1
60 TABLET, EXTENDED RELEASE ORAL 2 TIMES DAILY
Status: DISCONTINUED | OUTPATIENT
Start: 2025-06-10 | End: 2025-06-11 | Stop reason: HOSPADM

## 2025-06-10 RX ORDER — TACROLIMUS 5 MG/1
6 CAPSULE ORAL EVERY 12 HOURS
Qty: 60 CAPSULE | Refills: 11 | Status: SHIPPED | OUTPATIENT
Start: 2025-06-10 | End: 2025-06-10

## 2025-06-10 RX ORDER — SULFAMETHOXAZOLE AND TRIMETHOPRIM 400; 80 MG/1; MG/1
1 TABLET ORAL EVERY MORNING
Qty: 30 TABLET | Refills: 5 | Status: SHIPPED | OUTPATIENT
Start: 2025-06-09 | End: 2025-12-06

## 2025-06-10 RX ORDER — OXYBUTYNIN CHLORIDE 5 MG/1
5 TABLET ORAL 3 TIMES DAILY PRN
Status: DISCONTINUED | OUTPATIENT
Start: 2025-06-10 | End: 2025-06-11 | Stop reason: HOSPADM

## 2025-06-10 RX ORDER — MYCOPHENOLATE MOFETIL 250 MG/1
1000 CAPSULE ORAL 2 TIMES DAILY
Qty: 240 CAPSULE | Refills: 11 | Status: SHIPPED | OUTPATIENT
Start: 2025-06-10 | End: 2026-06-10

## 2025-06-10 RX ORDER — VALGANCICLOVIR 450 MG/1
900 TABLET, FILM COATED ORAL DAILY
Qty: 60 TABLET | Refills: 2 | Status: SHIPPED | OUTPATIENT
Start: 2025-06-09 | End: 2025-06-11

## 2025-06-10 RX ORDER — TACROLIMUS 1 MG/1
2 CAPSULE ORAL ONCE
Status: COMPLETED | OUTPATIENT
Start: 2025-06-10 | End: 2025-06-10

## 2025-06-10 RX ORDER — TACROLIMUS 1 MG/1
5 CAPSULE ORAL 2 TIMES DAILY
Status: DISCONTINUED | OUTPATIENT
Start: 2025-06-10 | End: 2025-06-11

## 2025-06-10 RX ORDER — SODIUM CHLORIDE 9 MG/ML
INJECTION, SOLUTION INTRAVENOUS CONTINUOUS
Status: ACTIVE | OUTPATIENT
Start: 2025-06-10 | End: 2025-06-10

## 2025-06-10 RX ORDER — FAMOTIDINE 20 MG/1
20 TABLET, FILM COATED ORAL NIGHTLY
Qty: 30 TABLET | Refills: 0 | Status: SHIPPED | OUTPATIENT
Start: 2025-06-10 | End: 2025-07-11

## 2025-06-10 RX ORDER — NIFEDIPINE 60 MG/1
60 TABLET, EXTENDED RELEASE ORAL 2 TIMES DAILY
Qty: 60 TABLET | Refills: 11 | Status: SHIPPED | OUTPATIENT
Start: 2025-06-10 | End: 2026-06-10

## 2025-06-10 RX ADMIN — LABETALOL HYDROCHLORIDE 200 MG: 200 TABLET, FILM COATED ORAL at 08:06

## 2025-06-10 RX ADMIN — SODIUM CHLORIDE: 9 INJECTION, SOLUTION INTRAVENOUS at 11:06

## 2025-06-10 RX ADMIN — TACROLIMUS 5 MG: 1 CAPSULE ORAL at 05:06

## 2025-06-10 RX ADMIN — OXYCODONE HYDROCHLORIDE 10 MG: 10 TABLET ORAL at 05:06

## 2025-06-10 RX ADMIN — Medication 1 TABLET: at 08:06

## 2025-06-10 RX ADMIN — HEPARIN SODIUM 5000 UNITS: 5000 INJECTION INTRAVENOUS; SUBCUTANEOUS at 05:06

## 2025-06-10 RX ADMIN — OXYBUTYNIN CHLORIDE 5 MG: 5 TABLET ORAL at 10:06

## 2025-06-10 RX ADMIN — SODIUM CHLORIDE: 9 INJECTION, SOLUTION INTRAVENOUS at 03:06

## 2025-06-10 RX ADMIN — DOCUSATE SODIUM 100 MG: 100 CAPSULE, LIQUID FILLED ORAL at 02:06

## 2025-06-10 RX ADMIN — HEPARIN SODIUM 5000 UNITS: 5000 INJECTION INTRAVENOUS; SUBCUTANEOUS at 02:06

## 2025-06-10 RX ADMIN — HEPARIN SODIUM 5000 UNITS: 5000 INJECTION INTRAVENOUS; SUBCUTANEOUS at 08:06

## 2025-06-10 RX ADMIN — BISACODYL 10 MG: 5 TABLET, COATED ORAL at 09:06

## 2025-06-10 RX ADMIN — METHOCARBAMOL 500 MG: 500 TABLET ORAL at 11:06

## 2025-06-10 RX ADMIN — MUPIROCIN 1 G: 20 OINTMENT TOPICAL at 08:06

## 2025-06-10 RX ADMIN — SODIUM CHLORIDE: 9 INJECTION, SOLUTION INTRAVENOUS at 09:06

## 2025-06-10 RX ADMIN — NIFEDIPINE 60 MG: 30 TABLET, FILM COATED, EXTENDED RELEASE ORAL at 10:06

## 2025-06-10 RX ADMIN — OXYCODONE HYDROCHLORIDE 10 MG: 10 TABLET ORAL at 02:06

## 2025-06-10 RX ADMIN — TACROLIMUS 2 MG: 1 CAPSULE ORAL at 10:06

## 2025-06-10 RX ADMIN — OXYCODONE HYDROCHLORIDE 10 MG: 10 TABLET ORAL at 06:06

## 2025-06-10 RX ADMIN — NIFEDIPINE 60 MG: 30 TABLET, FILM COATED, EXTENDED RELEASE ORAL at 08:06

## 2025-06-10 RX ADMIN — HYDRALAZINE HYDROCHLORIDE 50 MG: 50 TABLET ORAL at 05:06

## 2025-06-10 RX ADMIN — CEFAZOLIN 2 G: 2 INJECTION, POWDER, FOR SOLUTION INTRAMUSCULAR; INTRAVENOUS at 02:06

## 2025-06-10 RX ADMIN — METHOCARBAMOL 500 MG: 500 TABLET ORAL at 02:06

## 2025-06-10 RX ADMIN — FAMOTIDINE 20 MG: 20 TABLET, FILM COATED ORAL at 08:06

## 2025-06-10 RX ADMIN — OXYCODONE HYDROCHLORIDE 10 MG: 10 TABLET ORAL at 10:06

## 2025-06-10 RX ADMIN — TACROLIMUS 3 MG: 1 CAPSULE ORAL at 08:06

## 2025-06-10 RX ADMIN — METHYLPREDNISOLONE SODIUM SUCCINATE 250 MG: 125 INJECTION, POWDER, FOR SOLUTION INTRAMUSCULAR; INTRAVENOUS at 08:06

## 2025-06-10 RX ADMIN — HYDRALAZINE HYDROCHLORIDE 10 MG: 20 INJECTION, SOLUTION INTRAMUSCULAR; INTRAVENOUS at 03:06

## 2025-06-10 RX ADMIN — METHOCARBAMOL 500 MG: 500 TABLET ORAL at 08:06

## 2025-06-10 RX ADMIN — OXYCODONE HYDROCHLORIDE 10 MG: 10 TABLET ORAL at 12:06

## 2025-06-10 RX ADMIN — MYCOPHENOLATE MOFETIL 1000 MG: 250 CAPSULE ORAL at 08:06

## 2025-06-10 RX ADMIN — DOCUSATE SODIUM 100 MG: 100 CAPSULE, LIQUID FILLED ORAL at 09:06

## 2025-06-10 RX ADMIN — SODIUM CHLORIDE 175 MG: 9 INJECTION, SOLUTION INTRAVENOUS at 08:06

## 2025-06-10 RX ADMIN — DIPHENHYDRAMINE HYDROCHLORIDE 25 MG: 25 CAPSULE ORAL at 08:06

## 2025-06-10 RX ADMIN — ACETAMINOPHEN 650 MG: 325 TABLET ORAL at 02:06

## 2025-06-10 RX ADMIN — ACETAMINOPHEN 650 MG: 325 TABLET ORAL at 05:06

## 2025-06-10 RX ADMIN — ACETAMINOPHEN 650 MG: 325 TABLET ORAL at 08:06

## 2025-06-10 RX ADMIN — DOCUSATE SODIUM 100 MG: 100 CAPSULE, LIQUID FILLED ORAL at 08:06

## 2025-06-10 RX ADMIN — METHOCARBAMOL 500 MG: 500 TABLET ORAL at 10:06

## 2025-06-10 NOTE — ASSESSMENT & PLAN NOTE
-  Opportunistic infection prophylaxis will include valganciclovir for 3 months (CMV D - , R + ) and sulfamethoxazole-trimethoprim for 6 months.

## 2025-06-10 NOTE — PROGRESS NOTES
Admit Note     Met with patient, mother, and grandmother to assess patients needs due to pt being asleep.  Patient is a 29 y.o. single male, admitted for kidney transplant.     Patient admitted on 6/9/2025 .  At this time, patient presents as asleep.  At this time, patients caregiver presents as alert and oriented x 4, pleasant, good eye contact, well groomed, recall good, concentration/judgement good, average intelligence, calm, communicative, cooperative, and asking and answering questions appropriately.      Household/Family Systems (as reported by patients caregiver)     Patient resides with patient's mother, at [address] [facility] [place] [address].  Support system includes mother, s/o,grandmother and extended family.  Patient does not have dependents that are need of being cared for.     Patients primary caregiver is Sarah Duffy , patients mother, phone number [phone].  Confirmed patients contact information is [phone] (home);   Telephone Information:   Mobile [phone]   .    During admission, patient's caregiver plans to stay in patient's room.  Confirmed patient and patients caregivers do have access to reliable transportation.    Cognitive Status/Learning     Patients caregiver reports patients reading ability as 12th grade and states patient does not have difficulty with reading, writing, seeing, hearing, comprehension, learning, and memory.  Patients caregiver reports patient learns best by A combination of verbal, written, and hands-on instruction.  .   Needed: No.   Highest education level: High School (9-12) or GED    Vocation/Disability (as reported by patients caregiver)    Working for Income: No  If no, reason not working: Disability  Patient is disabled.    Adherence     Patients caregiver reports patient has a high level of adherence to patients health care regimen.  Adherence counseling and education provided.  Patient's caregiver verbalizes  understanding.    Substance Use    Patients caregiver reports patients substance usage as the following:    Tobacco: none, patient denies any use.  Alcohol: none, patient denies any use.  Illicit Drugs/Non-prescribed Medications: none, patient denies any use.  Patients caregiver states clear understanding of the potential impact of substance use.  Substance abstinence/cessation counseling, education and resources provided and reviewed.     Services Utilizing/ADLS (as reported by patients caregiver)    Infusion Service: Prior to admission, patient utilizing? no  Home Health: Prior to admission, patient utilizing? no  DME: Prior to admission, no  Pulmonary/Cardiac Rehab: Prior to admission, no  Dialysis:  Prior to admission, yes  Transplant Specialty Pharmacy:  Prior to admission, yes; [facility].    Prior to admission, patients caregiver reports patient was independent with ADLS and was driving.  Patients caregiver reports patient is not able to care for self at this time due to compromised medical condition (as documented in medical record) and physical weakness..  Patients caregiver reports patient indicates a willingness to care for self once medically cleared to do so.    Insurance/Medications    Insured by   Payer/Plan Subscr  Sex Relation Sub. Ins. ID Effective Group Num   1. [employer] MANAGE* DARLINE SEWELL 1995 Male Self [id number] 3/1/25 [id number]                                   [address] [address]   2. [place] M* DARLINE SEWELL 1995 Male Self [id number]                                    [address] [address]      Primary Insurance (for UNOS reporting): Public Insurance - Medicare FFS (Fee For Service)  Secondary Insurance (for UNOS reporting): Public Insurance - Medicaid    Patients caregiver reports patient is able to obtain and afford medications at this time and at time of discharge.    Living Will/Healthcare Power of     Patients caregiver reports patient does not have a LW  and/or HCPA.   provided education regarding LW and HCPA and the completion of forms.    Coping/Mental Health (as reported by patients caregiver)    Patient is coping adequately with the aid of  family members and friends.  Patients caregiver is coping adequately with the aid of  family members and friends.      Discharge Planning (as reported by patients caregiver)    At time of discharge, patient plans to return [facility] under the care of mother Sarah.  Patients mother will transport patient.  SW provided financial profile for completion. Per rounds today, expected discharge date is possible tomorrow. Patients caretaker verbalizes understanding and is involved in treatment planning and discharge process.    Additional Concerns    Patient's caretaker denies additional needs and/or concerns at this time. Patient is being followed for needs, education, resources, information, emotional support, supportive counseling, and for supportive and skilled discharge plan of care. Patient's caregiver verbalizes understanding and agreement with information reviewed,  availability and how to access available resources as needed.

## 2025-06-10 NOTE — PROGRESS NOTES
Patient admitted for Kidney transplant.  Transplant coordinator met with the patient on rounds to introduce self and explain the coordinator role.     The post-transplant teaching book was given.   Transplant Coordinator explained that she will follow the patient while in the hospital and assist with discharge.     Transplant Specifics  ESRD 2/2 HTN  DBD, KDPI 8%  Thymo induction  CMV D- R+

## 2025-06-10 NOTE — HOSPITAL COURSE
Patient is s/p DBD Kidney transplant for ESRD 2/2 HTN on 6/9/25. Surgery w/o issue. PD access removed. Incisions closed w derm norma bond. 2 day lobato and one KARTHIK placed.       Interval History: no acute events overnight. UOP 8.8L since transplant. Native UOP 500ml/d. Cont I=O till this afternoon then 100ml/hr and encourage PO. Cr has trended down 14.9 to 9. CBC stable. Plan to repeat labs this afternoon. Pt is passing flatus, denies n/v and is tolerating diet. Pain is semi managed w Oxy, muscle relaxant added. Pt up in chair. Modified BP meds- Labetalol 200 mg bid and Nifedipine 60 mg bid. Cont to monitor VS q4h. Encourage IS and OOB. Monitor.

## 2025-06-10 NOTE — ASSESSMENT & PLAN NOTE
- s/p DBD Ktxp   - surgery uneventful  - 2 d lobato, 1 KARTHIK  - incision closed w derma bond. PD access removed, also closed w derma bond  - plan to advance pathway and monitor need for additional fluids, cont NS at 100 ml/hr and encourage PO  - encourage OOB, IS and ambulate in hallways. Pain managed w Oxy and Muscle relaxant. Monitor.

## 2025-06-10 NOTE — ASSESSMENT & PLAN NOTE
- Give home BB pre op  - Resume home meds- Labetalol and Nifedipine post op, d/c'd Hydralazine, cont to monitor BP and need for adjustments.

## 2025-06-10 NOTE — DISCHARGE SUMMARY
[facility] - Transplant Stepdown  Kidney Transplant  Discharge Summary    Patient Name: Jeffrey Moreno  MRN: [id number]  Admission Date: 6/9/2025  Hospital Length of Stay: 2 days  Discharge Date and Time: 06/11/2025 10:55 AM  Attending Physician: Devante Sosa MD   Discharging Provider: Ashli Hernandez DNP  Primary Care Provider: Ellyn, Primary Doctor    HPI:   Mr. Moreno is a 29 y.o. year old Black or  male with ZAVALETA (saw hepatology 4/3, see note) and ESRD secondary to presumed HTN, no renal biopsy.  Initially started on HD 12/31/2023 for about 3 months before transitioning to home peritoneal dialysis. Currently doing cyclical PD, no peritonitis or exit site infections. Native UOP ~500 ml/day. Pre op labs and diagnostics in process, will be reviewed prior to surgery. Tentative OR time 0900 with Dr. Mcfadden.     Procedure(s) (LRB):  TRANSPLANT, KIDNEY (N/A)  REMOVAL, CATHETER, DIALYSIS, PERITONEAL (N/A)     Hospital Course:    Patient is s/p DBD Kidney transplant for ESRD 2/2 HTN on 6/9/25. Surgery w/o issue. Intra op US satisfactory. PD access removed. Incisions closed w derm a bond. 2 day lobato and one KARTHIK placed (removed 6/11). Patient progressed well. Cr trended down nicely. Cr 3.1 on discharge. Excellent uop. Lobato dc'd 6/11, voiding without difficulty.   Pt is passing flatus, denies n/v and is tolerating diet. Pain is managed w Oxycodone, muscle relaxant added. Pt up in chair. Modified BP meds- Labetalol 300 mg bid and Nifedipine 60 mg bid. He is stable and ready for discharge today. He will have follow up labs and clinic appointment 6/12.     Length of Stay was longer than expected due to: Discharged as expected    Goals of Care Treatment Preferences:  Code Status: Full Code      Final Active Diagnoses:    Diagnosis Date Noted POA    PRINCIPAL PROBLEM:  Status post kidney transplant [Z94.0] 06/10/2025 Not Applicable    At risk for opportunistic infections [Z91.89] 06/10/2025 No    Long-term current  use of immunomodulator [Z79.61] 06/10/2025 Not Applicable    Need for prophylactic immunotherapy [Z29.89] 06/10/2025 Not Applicable    Essential hypertension [I10] 12/31/2023 Yes     Chronic      Problems Resolved During this Admission:    Diagnosis Date Noted Date Resolved POA    ESRD (end stage renal disease) on dialysis [N18.6, Z99.2] 04/29/2024 06/11/2025 Not Applicable     Chronic     Treatments: see hospital course    Consults (From admission, onward)          Status Ordering Provider     Inpatient consult to Registered Dietitian/Nutritionist  Once        Provider:  (Not yet assigned)    Completed ARGENIS ZURITA JR     Inpatient consult to Transplant Nephrology (KTM)  Once        Provider:  (Not yet assigned)    Acknowledged ARGENIS ZURITA JR            Pending Diagnostic Studies:       None          Significant Diagnostic Studies: Labs: CMP   Recent Labs   Lab 06/10/25  0551 06/10/25  1306 06/11/25  0612    141 141   K 3.9 4.0 3.4*   * 113* 112*   CO2 22* 20* 21*   * 117* 89   BUN 60* 50* 34*   CREATININE 9.0* 6.0* 3.1*   CALCIUM 8.3* 8.2* 8.7   ALBUMIN 3.0* 3.0* 2.7*   ANIONGAP 8 8 8   , CBC   Recent Labs   Lab 06/10/25  0551 06/10/25  1306 06/11/25  0612   WBC 9.04 8.62 5.39   HGB 9.9* 10.1* 9.9*   HCT 30.6* 31.9* 31.9*    254 223   , INR   Lab Results   Component Value Date    INR 0.9 06/09/2025    INR 1 12/12/2024    INR 1.0 08/20/2024    PROTIME 10.3 06/09/2025   , and All labs within the past 24 hours have been reviewed    Discharged Condition: good    Disposition: Home or Self Care    Follow Up: see hospital course    Patient Instructions:      Diet Adult Regular     No dressing needed     Notify your health care provider if you experience any of the following:  temperature >100.4     Notify your health care provider if you experience any of the following:  persistent nausea and vomiting or diarrhea     Notify your health care provider if you experience any of the  following:  severe uncontrolled pain     Notify your health care provider if you experience any of the following:  redness, tenderness, or signs of infection (pain, swelling, redness, odor or green/yellow discharge around incision site)     Notify your health care provider if you experience any of the following:  difficulty breathing or increased cough     Notify your health care provider if you experience any of the following:  severe persistent headache     Notify your health care provider if you experience any of the following:  worsening rash     Notify your health care provider if you experience any of the following:  persistent dizziness, light-headedness, or visual disturbances     Notify your health care provider if you experience any of the following:  increased confusion or weakness     Notify your health care provider if you experience any of the following:   Order Comments: Any other concerning signs and symptoms. If you have not received your scheduled follow up within 24 hours of your discharge or for any questions or concerns, please call [phone] for further assistance.     Type And Screen Preop   Standing Status: Future Standing Exp. Date: 09/07/26     Activity as tolerated     Medications:  Reconciled Home Medications:      Medication List        START taking these medications      famotidine 20 MG tablet  Commonly known as: PEPCID  Take 1 tablet (20 mg total) by mouth every evening. Stop 7/11/25     methocarbamoL 500 MG Tab  Commonly known as: Robaxin  Take 1 tablet (500 mg total) by mouth 4 (four) times daily as needed (muscle spasms).     multivitamin Tab  Take 1 tablet by mouth once daily.     mycophenolate 250 mg Cap  Commonly known as: CELLCEPT  Take 4 capsules (1,000 mg total) by mouth 2 (two) times daily.     NIFEdipine 60 MG (OSM) 24 hr tablet  Commonly known as: PROCARDIA-XL  Take 1 tablet (60 mg total) by mouth 2 (two) times a day.  Replaces: NIFEdipine 90 MG Tbsr     oxybutynin 5 MG  Tab  Commonly known as: DITROPAN  Take 1 tablet (5 mg total) by mouth 3 (three) times daily as needed (Bladder spasms).     oxyCODONE 10 mg Tab immediate release tablet  Commonly known as: ROXICODONE  Take 1 tablet (10 mg total) by mouth every 4 (four) hours as needed for Pain.     predniSONE 5 MG tablet  Commonly known as: DELTASONE  Take 20mg by mouth once daily from 6/11/25-6/17;  Take 15mg daily from 6/18-6/24;  Take 10mg daily from  6/25-7/1/25;  Take 5mg daily thereafter starting 7/2/25.     sulfamethoxazole-trimethoprim 400-80mg 400-80 mg per tablet  Commonly known as: BACTRIM,SEPTRA  Take 1 tablet by mouth every morning. Stop 12/6/25     tacrolimus 1 MG Cap  Commonly known as: PROGRAF  Take 7 capsules (7 mg total) by mouth every 12 (twelve) hours.     valGANciclovir 450 mg Tab  Commonly known as: VALCYTE  Take 1 tablet (450 mg total) by mouth once daily. Stop 9/7/25            CHANGE how you take these medications      labetaloL 300 MG tablet  Commonly known as: NORMODYNE  Take 1 tablet (300 mg total) by mouth 2 (two) times daily.  What changed:   medication strength  how much to take            STOP taking these medications      calcium acetate(phosphat bind) 667 mg capsule  Commonly known as: PHOSLO     folic acid 1 MG tablet  Commonly known as: FOLVITE     gabapentin 100 MG capsule  Commonly known as: NEURONTIN     gentamicin 0.1 % cream  Commonly known as: GARAMYCIN     hydrALAZINE 25 MG tablet  Commonly known as: APRESOLINE     losartan 50 MG tablet  Commonly known as: COZAAR     minoxidiL 2.5 MG tablet  Commonly known as: LONITEN     NIFEdipine 90 MG Tbsr  Commonly known as: ADALAT CC  Replaced by: NIFEdipine 60 MG (OSM) 24 hr tablet     pantoprazole 40 MG tablet  Commonly known as: PROTONIX            Time spent caring for patient (Greater than 1/2 spent in direct face-to-face contact): > 30 minutes    Ashli Hernandez DNP  Kidney Transplant  [facility] - Transplant Stepdown

## 2025-06-10 NOTE — PROGRESS NOTES
[facility] - Transplant Stepdown  Kidney Transplant  Progress Note      Reason for Follow-up: Reassessment of Kidney Transplant - 6/9/2025  (#1) recipient and management of immunosuppression.    ORGAN: LEFT KIDNEY      Donor Type: Donation after Brain Death      Donor CMV Status: Negative  Donor HBcAB:Negative  Donor HCV Status:Negative  Donor HBV GABRIEL:   Donor HCV GABRIEL: Negative      Subjective:   History of Present Illness:  Mr. Moreno is a 29 y.o. year old Black or  male with ZAVALETA (saw hepatology 4/3, see note) and ESRD secondary to presumed HTN, no renal biopsy.  Initially started on HD 12/31/2023 for about 3 months before transitioning to home peritoneal dialysis. Currently doing cyclical PD, no peritonitis or exit site infections. Native UOP ~500 ml/day. Pre op labs and diagnostics in process, will be reviewed prior to surgery. Tentative OR time 0900 with Dr. Mcfadden.       Mr. Moreno is a 29 y.o. year old male who is status post Kidney Transplant - 6/9/2025  (#1).    His maintenance immunosuppression consists of:   Immunosuppressants (From admission, onward)      Start     Stop Route Frequency Ordered    06/10/25 1800  tacrolimus capsule 5 mg  (IP TXP KIDNEY POST-OP THYMO WITH PERIPHERAL PRECHECKED IBW)         -- Oral 2 times daily 06/10/25 0949    06/10/25 1000  tacrolimus capsule 2 mg         -- Oral Once 06/10/25 0949    06/10/25 0900  antithymocyte globulin (rabbit) 175 mg, hydrocortisone sodium succinate (SOLU-CORTEF) 20 mg in 0.9% NaCl 500 mL (FOR PERIPHERAL LINE ADMINISTRATION ONLY)  (IP TXP KIDNEY POST-OP THYMO WITH PERIPHERAL PRECHECKED IBW)         06/11/25 0859 IV Daily 06/09/25 1354    06/09/25 1400  mycophenolate capsule 1,000 mg  (IP TXP KIDNEY POST-OP THYMO WITH PERIPHERAL PRECHECKED IBW)         -- Oral 2 times daily 06/09/25 1354            Hospital Course:  Patient is s/p DBD Kidney transplant for ESRD 2/2 HTN on 6/9/25. Surgery w/o issue. PD access removed. Incisions closed w  derm a bond. 2 day lobato and one KARTHIK placed.       Interval History: no acute events overnight. UOP 8.8L since transplant. Native UOP 500ml/d. Cont I=O till this afternoon then 100ml/hr and encourage PO. Cr has trended down 14.9 to 9. CBC stable. Pt is passing flatus, denies n/v and is tolerating diet. Pain is semi managed w Oxy, muscle relaxant added. Pt up in chair. Modified BP meds- Labetalol 200 mg bid and Nifedipine 60 mg bid. Cont to monitor. Encourage IS and OOB. Monitor.    Past Medical, Surgical, Family, and Social History:   Unchanged from H&P.    Scheduled Meds:   acetaminophen  650 mg Oral Q8H    acetaminophen  650 mg Oral Q24H    antithymocyte globulin (rabbit) 175 mg, hydrocortisone sodium succinate (SOLU-CORTEF) 20 mg in 0.9% NaCl 500 mL (FOR PERIPHERAL LINE ADMINISTRATION ONLY)  2.25 mg/kg (Ideal) Intravenous Daily    bisacodyL  10 mg Oral QHS    docusate sodium  100 mg Oral TID    famotidine  20 mg Oral QHS    heparin (porcine)  5,000 Units Subcutaneous Q8H    labetalol  200 mg Oral Q12H    methocarbamoL  500 mg Oral TID    multivitamin  1 tablet Oral Daily    mupirocin  1 g Nasal BID    mycophenolate  1,000 mg Oral BID    [START ON 6/11/2025] predniSONE  20 mg Oral Daily    [START ON 6/19/2025] sulfamethoxazole-trimethoprim 400-80mg  1 tablet Oral Daily AM    tacrolimus  3 mg Oral BID    [START ON 6/19/2025] valGANciclovir  450 mg Oral Daily AM     Continuous Infusions:   0.9% NaCl   Intravenous Continuous 100 mL/hr at 06/10/25 0930 Rate Change at 06/10/25 0930    D5 and 0.45% NaCl   Intravenous Continuous 50 mL/hr at 06/10/25 0604 Rate Verify at 06/10/25 0604    glucagon (human recombinant)  1 mg Intramuscular Continuous PRN         PRN Meds:  Current Facility-Administered Medications:     dextrose 50%, 12.5 g, Intravenous, PRN    diphenhydrAMINE, 50 mg, Oral, Once PRN    EPINEPHrine (PF), 1 mg, Subcutaneous, Once PRN    glucagon (human recombinant), 1 mg, Intramuscular, Continuous PRN    glucose,  16 g, Oral, PRN    glucose, 16 g, Oral, PRN    glucose, 24 g, Oral, PRN    hydrALAZINE, 10 mg, Intravenous, Q6H PRN    hydrocortisone sodium succinate, 100 mg, Intravenous, Once PRN    insulin aspart U-100, 0-5 Units, Subcutaneous, QID (AC + HS) PRN    ondansetron, 4 mg, Intravenous, Q6H PRN    oxyCODONE, 5 mg, Oral, Q4H PRN    oxyCODONE, 10 mg, Oral, Q4H PRN    sodium chloride 0.9%, 10 mL, Intravenous, PRN    Intake/Output - Last 3 Shifts         06/08 0700  06/09 0659 06/09 0700  06/10 0659 06/10 0700  06/11 0659    P.O.  1740 480    I.V. (mL/kg)  6302.5 (80.1) 397.5 (5.1)    IV Piggyback  2450     Total Intake(mL/kg)  41263.5 (133.3) 877.5 (11.3)    Urine (mL/kg/hr) 50 8895 (4.7) 375 (1.7)    Drains  255     Stool  0     Total Output 50 9150 375    Net -50 +1342.5 +502.5           Unmeasured Stool Occurrence  0 x              Review of Systems   Constitutional:  Positive for activity change, appetite change and fatigue. Negative for chills and fever.   HENT: Negative.  Negative for trouble swallowing.    Respiratory: Negative.     Cardiovascular: Negative.    Gastrointestinal:  Positive for abdominal distention and abdominal pain (as expected). Negative for diarrhea, nausea and vomiting.   Endocrine: Negative.    Genitourinary:  Positive for hematuria.   Musculoskeletal: Negative.    Skin:  Positive for wound.   Allergic/Immunologic: Positive for immunocompromised state.   Hematological: Negative.    Psychiatric/Behavioral:  Negative for decreased concentration and dysphoric mood. The patient is not nervous/anxious.       Objective:     Vital Signs (Most Recent):  Temp: 98.6 °F (37 °C) (06/10/25 0727)  Pulse: 100 (06/10/25 0932)  Resp: 16 (06/10/25 0932)  BP: (!) 164/98 (06/10/25 0932)  SpO2: 97 % (06/10/25 0932) Vital Signs (24h Range):  Temp:  [97.9 °F (36.6 °C)-98.6 °F (37 °C)] 98.6 °F (37 °C)  Pulse:  [] 100  Resp:  [14-26] 16  SpO2:  [94 %-100 %] 97 %  BP: (110-184)/() 164/98     Weight: 77.7 kg  "(171 lb 6.5 oz)  Height: 6' 1" (185.4 cm)  Body mass index is 22.61 kg/m².     Physical Exam  Vitals and nursing note reviewed.   Constitutional:       Appearance: Normal appearance.   HENT:      Head: Normocephalic.   Eyes:      General: No scleral icterus.  Cardiovascular:      Rate and Rhythm: Normal rate and regular rhythm.      Pulses: Normal pulses.      Heart sounds: Normal heart sounds.   Pulmonary:      Effort: Pulmonary effort is normal.      Breath sounds: Normal breath sounds.   Abdominal:      General: Bowel sounds are normal. There is distension.      Tenderness: There is abdominal tenderness.      Comments: RLQ incision closed w derm a bond  Lap sites and PD site CDI   Musculoskeletal:         General: Normal range of motion.      Cervical back: Normal range of motion.   Skin:     General: Skin is warm and dry.      Capillary Refill: Capillary refill takes 2 to 3 seconds.   Neurological:      General: No focal deficit present.      Mental Status: He is alert and oriented to person, place, and time.   Psychiatric:         Mood and Affect: Mood normal.         Behavior: Behavior normal.         Thought Content: Thought content normal.         Judgment: Judgment normal.          Laboratory:  CBC:   Recent Labs   Lab 06/09/25  0537 06/09/25  1403 06/09/25  1833 06/10/25  0551   WBC 7.64  --  8.28 9.04   RBC 3.82*  --  3.77* 3.55*   HGB 10.7*  --  10.5* 9.9*   HCT 33.2* 30.2* 32.5* 30.6*     --  218 250   MCV 87  --  86 86   MCH 28.0  --  27.9 27.9   MCHC 32.2  --  32.3 32.4     CMP:   Recent Labs   Lab 06/09/25  0536 06/09/25  1403 06/09/25  1833 06/10/25  0551   GLU 84 101 121* 123*   CALCIUM 9.3 8.2* 8.4* 8.3*   ALBUMIN 3.7 3.1* 3.4* 3.0*   PROT 6.9  --   --   --     140 141 141   K 4.2 5.6* 4.4 3.9   CO2 21* 22* 20* 22*    110 109 111*   BUN 93* 92* 83* 60*   CREATININE 19.4* 18.2* 14.9* 9.0*   ALKPHOS 87  --   --   --    ALT 38  --   --   --    AST 34  --   --   --      Coagulation: " "No results for input(s): "PT", "APTT" in the last 168 hours.  Labs within the past 24 hours have been reviewed.    Diagnostic Results:  Chest X-Ray: No results found for this or any previous visit.  US - Kidney: No results found for this or any previous visit.  Assessment/Plan:     * ESRD (end stage renal disease) on dialysis  - ESRD 2/2 presumed HTN  - Primary for kidney txp 6/9  - Pre op labs and diagnostics pending, will be reviewed prior to surgery.  - Tentative OR time 0900 with Dr. Mcfadden as primary surgeon.        Need for prophylactic immunotherapy  - see long term immuno      Long-term current use of immunomodulator  - This patient will receive anti-thymocyte globulin 4.5 mg/kg for induction.  This patients maintenance immunosuppression will include a steroid taper per protocol to 5mg daily, tacrolimus, and mycophenolate maintenance.  - Chronic Prophylactic Immunosuppression- cont to check prograf level daily.  Assess for toxicity and adjust level as needed        At risk for opportunistic infections  -  Opportunistic infection prophylaxis will include valganciclovir for 3 months (CMV D - , R + ) and sulfamethoxazole-trimethoprim for 6 months.       Status post kidney transplant  - s/p DBD Ktxp, KDPI 8%, CMV D-/R+  - surgery uneventful  - 2 d lobato, 1 KARTHIK  - incision closed w derma bond. PD access removed, also closed w derma bond  - plan to advance pathway and monitor need for additional fluids, cont NS at 100 ml/hr and encourage PO  - encourage OOB, IS and ambulate in hallways. Pain managed w Oxy and Muscle relaxant. Monitor.      Essential hypertension  - Give home BB pre op  - Resume home meds- Labetalol and Nifedipine post op, d/c'd Hydralazine, cont to monitor BP and need for adjustments.          Discharge Planning:  Discussed plan of care.  No plan for discharge today.    Medical decision making for this encounter includes review of pertinent labs and diagnostic studies, assessment and planning, " discussions with consulting providers, discussion with patient/family, and participation in multidisciplinary rounds. Time spent caring for patient: 90 minutes    Vita Gardner NP  Kidney Transplant  [facility] - Transplant Stepdown

## 2025-06-10 NOTE — ASSESSMENT & PLAN NOTE
- This patient will receive anti-thymocyte globulin 4.5 mg/kg for induction.  This patients maintenance immunosuppression will include a steroid taper per protocol to 5mg daily, tacrolimus, and mycophenolate maintenance.  - Chronic Prophylactic Immunosuppression- cont to check prograf level daily.  Assess for toxicity and adjust level as needed

## 2025-06-10 NOTE — PLAN OF CARE
Pt aao x4. Call bell within reach. He is up with standby assist. Up to chair today. Ambulated in hallway with assist. C/o of feeling bloated. Encouraged more oob activity. He is +belching, +passing gas, no bm yet. Self meds reviewed with pt and mother this am. Both verbalized understanding. Thymo #2/2 infusing. NS at 100/hr. Pt encouraged to increase oral intake. Reviewed IS with patient. He is getting up to 1250. Will continue to encourage IS and deep breaths. Pain controlled with prn pain meds. Plan to d/c luis alfredo in AM. He verbalized understanding of plan of care.

## 2025-06-10 NOTE — PLAN OF CARE
Pt AAOx4, sleeping between care. Pt hypertensive, PRN hydralazine IVP administered x1 with relief. All other VSS on RA. Pt remains on I=O--- D51/2NS infusing @50cc/hr, see MAR for NS infusion. Sullivan intact with pink/red tinged UOP. RLQ incision CDI with island border dressing, RLQ KARTHIK drain with serous output--see flowsheet. Pain managed well with oxycodone. Pt passing flatus, ambulated to restroom with 1x assist, no BM. Pt drinking lots of fluids, see flowsheet for I/O. BG monitored AC/HS, SSI administered per MAR. Self meds set up, ready for teaching in AM. Pt tolerated dinner with no N/V. Bed in lowest locked position, call light within reach, mother/grandmother at bedside attentive to pt, POC ongoing.

## 2025-06-10 NOTE — PROGRESS NOTES
TRANSPLANT NOTE:      ORGAN: LEFT KIDNEY      Disease Etiology: Hypertensive Nephrosclerosis  Donor Type: Donation after Brain Death      CDC High Risk: No      Donor CMV Status:   Negative  Donor HBcAB: Negative      Donor HCV Status: Negative      Peak cPRA % (within 1 year of transplant): 88      Jeffrey Moreno is a 29 y.o. male s/p  Donation after Brain Death     kidney transplant on 6/9/2025 (Kidney) for Hypertensive Nephrosclerosis.   This patient will receive anti-thymocyte globulin 4.5 mg/kg for induction.  This patients maintenance immunosuppression will include a steroid taper per protocol to 5mg daily, tacrolimus, and mycophenolate maintenance.  Opportunistic infection prophylaxis will include valganciclovir for 3 months (CMV D - , R + ) and sulfamethoxazole-trimethoprim for 6 months.  Patient is to begin self medications upon transfer to the [facility], and I plan to meet with this patient and his/her support person prior to discharge to review the medication section of the Kidney Transplant Education Manual.  I have reviewed the pre-op medications and have restarted those, as appropriate.

## 2025-06-10 NOTE — PLAN OF CARE
Recommendations     1.) Recommend continuing with Renal Non HD diet as tolerated.                  - may liberalize to Low Na diet as renal labs begin to normalize.                  - RN staff: please continue to document PO intake in the flowsheets      2.) Recommend to add Novasource Renal daily to provide an additional 475 kcal and 22g PRO.      3.) RD to monitor wt, PO intake, skin, labs.       Goals: To meet % of EEN/EPN by next RD f/u   Nutrition Goal Status: new  Communication of RD Recs:  (POC)     Nutrition Discharge Planning      Nutrition Discharge Planning: Therapeutic diet (comments), Other (comments)  Therapeutic diet (comments): Low Na  Other (comments): Food Safety Nutrition Education provided on 6/10. Discussed cooking foods to proper temperatures, avoiding foods with drug interactions, handling produce, meal planning tips, food safety resources, foods to avoid, and foods that are recommended.

## 2025-06-10 NOTE — CONSULTS
[facility] - Transplant Stepdown  Adult Nutrition  Consult Note    SUMMARY     Recommendations    1.) Recommend continuing with Renal Non HD diet as tolerated.      - may liberalize to Low Na diet as renal labs begin to normalize.      - RN staff: please continue to document PO intake in the flowsheets     2.) Recommend to add Novasource Renal daily to provide an additional 475 kcal and 22g PRO.     3.) RD to monitor wt, PO intake, skin, labs.      Goals: To meet % of EEN/EPN by next RD f/u   Nutrition Goal Status: new  Communication of RD Recs:  (POC)    Nutrition Discharge Planning     Nutrition Discharge Planning: Therapeutic diet (comments), Other (comments)  Therapeutic diet (comments): Low Na  Other (comments): Food Safety Nutrition Education provided on 6/10. Discussed cooking foods to proper temperatures, avoiding foods with drug interactions, handling produce, meal planning tips, food safety resources, foods to avoid, and foods that are recommended.    Reason for Assessment    Reason For Assessment: consult (s/p DBD kidney tx)  Diagnosis: renal disease (ESRD w/ HD; s/p DBD kidney tx)    General Information Comments: Pt admitted for ESRD on dialysis; s/p kidney txp. PMHx of anemia, disorder of kidney and ureter, enteritis, HTN, LVH, ZAVALETA, prostatitis, and secondary hyperparathyroidism. Upon assessment, patient reports moderate and improving appetite. He reports 25-50% intake post op. No GI s/s. Last BM 6/9 before surgery. Pt endorses bloating. He reports no recent significant change in wt; UBW is ~170 lbs and last wt 171 lbs. Usual intake of 2 meals per day at home with snacks between. No vitamin/supplement regimen. No nutritionally significant wounds. Mild fluid acclimatation of BLE. NFPE not warranted; pt is well-nourished and does not meet criteria for malnutrition at this time. RD follow-up.    Nutrition Related Social Determinants of Health: SDOH: Adequate food in home environment     Food  "Insecurity: No Food Insecurity (2025)    Hunger Vital Sign     Worried About Running Out of Food in the Last Year: Never true     Ran Out of Food in the Last Year: Never true      Nutrition/Diet History    Spiritual, Cultural Beliefs, Restoration Practices, Values that Affect Care: no  Food Allergies: NKFA  Factors Affecting Nutritional Intake: abdominal distension    Anthropometrics    Height: 6' 1" (185.4 cm)  Height (inches): 73 in  Weight: 77.7 kg (171 lb 4.8 oz)  Weight (lb): 171.3 lb  Weight Method: Standard Scale  Ideal Body Weight (IBW), Male: 184 lb  % Ideal Body Weight, Male (lb): 93.1 %  BMI (Calculated): 22.6  BMI Grade: 18.5-24.9 - normal  Usual Body Weight (UBW), k.27 kg  % Usual Body Weight: 100.77  % Weight Change From Usual Weight: 0.56 %    Lab/Procedures/Meds    Pertinent Labs Reviewed: reviewed  Pertinent Labs Comments: Chloride: 111 (H); CO2: 22 (L); glucose: 123 (H); BUN: 60 (H); creatinine: 9.0 (H); calcium: 8.3 (L); albumin: 3.0 (L); urine protein: 93 (H); urine protein/creatinine ratio: 0.84 (H)  Pertinent Medications Reviewed: reviewed  Pertinent Medications Comments: Bisacodyl, famotidine, heparin, methylprednisone, multivitamin, mycophenolate, prednisone, tacromilus    Estimated/Assessed Needs    Weight Used For Calorie Calculations: 77.7 kg (171 lb 4.8 oz)  Energy Calorie Requirements (kcal): 1943- 2331 kcal  Energy Need Method: Kcal/kg (25-30 kcal/kg)  Protein Requirements: 93- 117g (1.2-1.5g/kg)  Weight Used For Protein Calculations: 77.7 kg (171 lb 4.8 oz)  Fluid Requirements (mL): as per MD or RDA  Estimated Fluid Requirement Method: RDA Method  RDA Method (mL):     Nutrition Prescription Ordered    Current Diet Order: Renal Non-Dialysis    Evaluation of Received Nutrient/Fluid Intake    I/O: +1.3L since admit  Energy Calories Required: not meeting needs  Protein Required: not meeting needs  Fluid Required:  (as per MD)  Comments: LBM   Tolerance: tolerating  % Intake " of Estimated Energy Needs: 25 - 50 %  % Meal Intake: 25 - 50 %    PES Statement  Increased nutrient needs (protein/energy) related to Wound healing (increased metabolic needs) as evidenced by Other (comment) (s/p DBD kidney tx)  Status: New    Nutrition Risk    Level of Risk/Frequency of Follow-up: low - moderate     Monitor and Evaluation    Monitor and Evaluation: Food and beverage intake, Diet order, Food and nutrition knowledge, Weight, Beliefs and attitudes, Gastrointestinal profile, Electrolyte and renal panel, Glucose/endocrine profile, Skin     Nutrition Follow-Up    RD Follow-up?: Yes

## 2025-06-10 NOTE — TELEPHONE ENCOUNTER
JUANITAOS AMFE [id number]    6/9/2025 07:07  Report received from Marlene Pedraza RN    Patient is being admitted to [facility] for a kidney transplant.

## 2025-06-10 NOTE — SUBJECTIVE & OBJECTIVE
Subjective:   History of Present Illness:  Mr. Moreno is a 29 y.o. year old Black or  male with ZAVALETA (saw hepatology 4/3, see note) and ESRD secondary to presumed HTN, no renal biopsy.  Initially started on HD 12/31/2023 for about 3 months before transitioning to home peritoneal dialysis. Currently doing cyclical PD, no peritonitis or exit site infections. Native UOP ~500 ml/day. Pre op labs and diagnostics in process, will be reviewed prior to surgery. Tentative OR time 0900 with Dr. Mcfadden.       Mr. Moreno is a 29 y.o. year old male who is status post Kidney Transplant - 6/9/2025  (#1).    His maintenance immunosuppression consists of:   Immunosuppressants (From admission, onward)      Start     Stop Route Frequency Ordered    06/10/25 1800  tacrolimus capsule 5 mg  (IP TXP KIDNEY POST-OP THYMO WITH PERIPHERAL PRECHECKED IBW)         -- Oral 2 times daily 06/10/25 0949    06/10/25 1000  tacrolimus capsule 2 mg         -- Oral Once 06/10/25 0949    06/10/25 0900  antithymocyte globulin (rabbit) 175 mg, hydrocortisone sodium succinate (SOLU-CORTEF) 20 mg in 0.9% NaCl 500 mL (FOR PERIPHERAL LINE ADMINISTRATION ONLY)  (IP TXP KIDNEY POST-OP THYMO WITH PERIPHERAL PRECHECKED IBW)         06/11/25 0859 IV Daily 06/09/25 1354    06/09/25 1400  mycophenolate capsule 1,000 mg  (IP TXP KIDNEY POST-OP THYMO WITH PERIPHERAL PRECHECKED IBW)         -- Oral 2 times daily 06/09/25 1354            Hospital Course:  Patient is s/p DBD Kidney transplant for ESRD 2/2 HTN on 6/9/25. Surgery w/o issue. PD access removed. Incisions closed w derm a bond. 2 day lobato and one KARTHIK placed.       Interval History: no acute events overnight. UOP 8.8L since transplant. Native UOP 500ml/d. Cont I=O till this afternoon then 100ml/hr and encourage PO. Cr has trended down 14.9 to 9. CBC stable. Pt is passing flatus, denies n/v and is tolerating diet. Pain is semi managed w Oxy, muscle relaxant added. Pt up in chair. Modified BP  meds- Labetalol 200 mg bid and Nifedipine 60 mg bid. Cont to monitor. Encourage IS and OOB. Monitor.    Past Medical, Surgical, Family, and Social History:   Unchanged from H&P.    Scheduled Meds:   acetaminophen  650 mg Oral Q8H    acetaminophen  650 mg Oral Q24H    antithymocyte globulin (rabbit) 175 mg, hydrocortisone sodium succinate (SOLU-CORTEF) 20 mg in 0.9% NaCl 500 mL (FOR PERIPHERAL LINE ADMINISTRATION ONLY)  2.25 mg/kg (Ideal) Intravenous Daily    bisacodyL  10 mg Oral QHS    docusate sodium  100 mg Oral TID    famotidine  20 mg Oral QHS    heparin (porcine)  5,000 Units Subcutaneous Q8H    labetalol  200 mg Oral Q12H    methocarbamoL  500 mg Oral TID    multivitamin  1 tablet Oral Daily    mupirocin  1 g Nasal BID    mycophenolate  1,000 mg Oral BID    [START ON 6/11/2025] predniSONE  20 mg Oral Daily    [START ON 6/19/2025] sulfamethoxazole-trimethoprim 400-80mg  1 tablet Oral Daily AM    tacrolimus  3 mg Oral BID    [START ON 6/19/2025] valGANciclovir  450 mg Oral Daily AM     Continuous Infusions:   0.9% NaCl   Intravenous Continuous 100 mL/hr at 06/10/25 0930 Rate Change at 06/10/25 0930    D5 and 0.45% NaCl   Intravenous Continuous 50 mL/hr at 06/10/25 0604 Rate Verify at 06/10/25 0604    glucagon (human recombinant)  1 mg Intramuscular Continuous PRN         PRN Meds:  Current Facility-Administered Medications:     dextrose 50%, 12.5 g, Intravenous, PRN    diphenhydrAMINE, 50 mg, Oral, Once PRN    EPINEPHrine (PF), 1 mg, Subcutaneous, Once PRN    glucagon (human recombinant), 1 mg, Intramuscular, Continuous PRN    glucose, 16 g, Oral, PRN    glucose, 16 g, Oral, PRN    glucose, 24 g, Oral, PRN    hydrALAZINE, 10 mg, Intravenous, Q6H PRN    hydrocortisone sodium succinate, 100 mg, Intravenous, Once PRN    insulin aspart U-100, 0-5 Units, Subcutaneous, QID (AC + HS) PRN    ondansetron, 4 mg, Intravenous, Q6H PRN    oxyCODONE, 5 mg, Oral, Q4H PRN    oxyCODONE, 10 mg, Oral, Q4H PRN    sodium chloride  "0.9%, 10 mL, Intravenous, PRN    Intake/Output - Last 3 Shifts         06/08 0700 06/09 0659 06/09 0700  06/10 0659 06/10 0700 06/11 0659    P.O.  1740 480    I.V. (mL/kg)  6302.5 (80.1) 397.5 (5.1)    IV Piggyback  2450     Total Intake(mL/kg)  74892.5 (133.3) 877.5 (11.3)    Urine (mL/kg/hr) 50 8895 (4.7) 375 (1.7)    Drains  255     Stool  0     Total Output 50 9150 375    Net -50 +1342.5 +502.5           Unmeasured Stool Occurrence  0 x              Review of Systems   Constitutional:  Positive for activity change, appetite change and fatigue. Negative for chills and fever.   HENT: Negative.  Negative for trouble swallowing.    Respiratory: Negative.     Cardiovascular: Negative.    Gastrointestinal:  Positive for abdominal distention and abdominal pain (as expected). Negative for diarrhea, nausea and vomiting.   Endocrine: Negative.    Genitourinary:  Positive for hematuria.   Musculoskeletal: Negative.    Skin:  Positive for wound.   Allergic/Immunologic: Positive for immunocompromised state.   Hematological: Negative.    Psychiatric/Behavioral:  Negative for decreased concentration and dysphoric mood. The patient is not nervous/anxious.       Objective:     Vital Signs (Most Recent):  Temp: 98.6 °F (37 °C) (06/10/25 0727)  Pulse: 100 (06/10/25 0932)  Resp: 16 (06/10/25 0932)  BP: (!) 164/98 (06/10/25 0932)  SpO2: 97 % (06/10/25 0932) Vital Signs (24h Range):  Temp:  [97.9 °F (36.6 °C)-98.6 °F (37 °C)] 98.6 °F (37 °C)  Pulse:  [] 100  Resp:  [14-26] 16  SpO2:  [94 %-100 %] 97 %  BP: (110-184)/() 164/98     Weight: 77.7 kg (171 lb 6.5 oz)  Height: 6' 1" (185.4 cm)  Body mass index is 22.61 kg/m².     Physical Exam  Vitals and nursing note reviewed.   Constitutional:       Appearance: Normal appearance.   HENT:      Head: Normocephalic.   Eyes:      General: No scleral icterus.  Cardiovascular:      Rate and Rhythm: Normal rate and regular rhythm.      Pulses: Normal pulses.      Heart sounds: " "Normal heart sounds.   Pulmonary:      Effort: Pulmonary effort is normal.      Breath sounds: Normal breath sounds.   Abdominal:      General: Bowel sounds are normal. There is distension.      Tenderness: There is abdominal tenderness.      Comments: RLQ incision closed w derm a bond  Lap sites and PD site CDI   Musculoskeletal:         General: Normal range of motion.      Cervical back: Normal range of motion.   Skin:     General: Skin is warm and dry.      Capillary Refill: Capillary refill takes 2 to 3 seconds.   Neurological:      General: No focal deficit present.      Mental Status: He is alert and oriented to person, place, and time.   Psychiatric:         Mood and Affect: Mood normal.         Behavior: Behavior normal.         Thought Content: Thought content normal.         Judgment: Judgment normal.          Laboratory:  CBC:   Recent Labs   Lab 06/09/25  0537 06/09/25  1403 06/09/25  1833 06/10/25  0551   WBC 7.64  --  8.28 9.04   RBC 3.82*  --  3.77* 3.55*   HGB 10.7*  --  10.5* 9.9*   HCT 33.2* 30.2* 32.5* 30.6*     --  218 250   MCV 87  --  86 86   MCH 28.0  --  27.9 27.9   MCHC 32.2  --  32.3 32.4     CMP:   Recent Labs   Lab 06/09/25  0536 06/09/25  1403 06/09/25  1833 06/10/25  0551   GLU 84 101 121* 123*   CALCIUM 9.3 8.2* 8.4* 8.3*   ALBUMIN 3.7 3.1* 3.4* 3.0*   PROT 6.9  --   --   --     140 141 141   K 4.2 5.6* 4.4 3.9   CO2 21* 22* 20* 22*    110 109 111*   BUN 93* 92* 83* 60*   CREATININE 19.4* 18.2* 14.9* 9.0*   ALKPHOS 87  --   --   --    ALT 38  --   --   --    AST 34  --   --   --      Coagulation: No results for input(s): "PT", "APTT" in the last 168 hours.  Labs within the past 24 hours have been reviewed.    Diagnostic Results:  Chest X-Ray: No results found for this or any previous visit.  US - Kidney: No results found for this or any previous visit.  "

## 2025-06-11 VITALS
WEIGHT: 176.81 LBS | TEMPERATURE: 99 F | RESPIRATION RATE: 18 BRPM | SYSTOLIC BLOOD PRESSURE: 167 MMHG | HEART RATE: 82 BPM | DIASTOLIC BLOOD PRESSURE: 103 MMHG | OXYGEN SATURATION: 99 % | HEIGHT: 73 IN | BODY MASS INDEX: 23.43 KG/M2

## 2025-06-11 PROBLEM — Z99.2 ESRD (END STAGE RENAL DISEASE) ON DIALYSIS: Chronic | Status: RESOLVED | Noted: 2024-04-29 | Resolved: 2025-06-11

## 2025-06-11 PROBLEM — N18.6 ESRD (END STAGE RENAL DISEASE) ON DIALYSIS: Chronic | Status: RESOLVED | Noted: 2024-04-29 | Resolved: 2025-06-11

## 2025-06-11 LAB
ABSOLUTE EOSINOPHIL (OHS): 0.07 K/UL
ABSOLUTE MONOCYTE (OHS): 0.38 K/UL (ref 0.3–1)
ABSOLUTE NEUTROPHIL COUNT (OHS): 4.83 K/UL (ref 1.8–7.7)
ALBUMIN SERPL BCP-MCNC: 2.7 G/DL (ref 3.5–5.2)
ANION GAP (OHS): 8 MMOL/L (ref 8–16)
BASOPHILS # BLD AUTO: 0.01 K/UL
BASOPHILS NFR BLD AUTO: 0.2 %
BUN SERPL-MCNC: 34 MG/DL (ref 6–20)
CALCIUM SERPL-MCNC: 8.7 MG/DL (ref 8.7–10.5)
CHLORIDE SERPL-SCNC: 112 MMOL/L (ref 95–110)
CO2 SERPL-SCNC: 21 MMOL/L (ref 23–29)
CREAT SERPL-MCNC: 3.1 MG/DL (ref 0.5–1.4)
ERYTHROCYTE [DISTWIDTH] IN BLOOD BY AUTOMATED COUNT: 15.8 % (ref 11.5–14.5)
GFR SERPLBLD CREATININE-BSD FMLA CKD-EPI: 27 ML/MIN/1.73/M2
GLUCOSE SERPL-MCNC: 89 MG/DL (ref 70–110)
HCT VFR BLD AUTO: 31.9 % (ref 40–54)
HGB BLD-MCNC: 9.9 GM/DL (ref 14–18)
IMM GRANULOCYTES # BLD AUTO: 0.02 K/UL (ref 0–0.04)
IMM GRANULOCYTES NFR BLD AUTO: 0.4 % (ref 0–0.5)
LYMPHOCYTES # BLD AUTO: 0.08 K/UL (ref 1–4.8)
MAGNESIUM SERPL-MCNC: 2.2 MG/DL (ref 1.6–2.6)
MCH RBC QN AUTO: 27.6 PG (ref 27–31)
MCHC RBC AUTO-ENTMCNC: 31 G/DL (ref 32–36)
MCV RBC AUTO: 89 FL (ref 82–98)
NUCLEATED RBC (/100WBC) (OHS): 0 /100 WBC
PHOSPHATE SERPL-MCNC: 2.5 MG/DL (ref 2.7–4.5)
PLATELET # BLD AUTO: 223 K/UL (ref 150–450)
PMV BLD AUTO: 9.9 FL (ref 9.2–12.9)
POCT GLUCOSE: 121 MG/DL (ref 70–110)
POCT GLUCOSE: 151 MG/DL (ref 70–110)
POTASSIUM SERPL-SCNC: 3.4 MMOL/L (ref 3.5–5.1)
RBC # BLD AUTO: 3.59 M/UL (ref 4.6–6.2)
RELATIVE EOSINOPHIL (OHS): 1.3 %
RELATIVE LYMPHOCYTE (OHS): 1.5 % (ref 18–48)
RELATIVE MONOCYTE (OHS): 7.1 % (ref 4–15)
RELATIVE NEUTROPHIL (OHS): 89.5 % (ref 38–73)
SODIUM SERPL-SCNC: 141 MMOL/L (ref 136–145)
TACROLIMUS BLD-MCNC: 2.9 NG/ML (ref 5–15)
W HEPATITIS B SURFACE ANTIBODY, QUALITATIVE: POSITIVE
W HEPATITIS B SURFACE ANTIBODY, QUANTITATIVE: NORMAL MIU/ML
WBC # BLD AUTO: 5.39 K/UL (ref 3.9–12.7)

## 2025-06-11 PROCEDURE — 82040 ASSAY OF SERUM ALBUMIN: CPT | Performed by: TRANSPLANT SURGERY

## 2025-06-11 PROCEDURE — 85025 COMPLETE CBC W/AUTO DIFF WBC: CPT | Performed by: TRANSPLANT SURGERY

## 2025-06-11 PROCEDURE — 63600175 PHARM REV CODE 636 W HCPCS: Performed by: NURSE PRACTITIONER

## 2025-06-11 PROCEDURE — 80197 ASSAY OF TACROLIMUS: CPT | Performed by: TRANSPLANT SURGERY

## 2025-06-11 PROCEDURE — 63600175 PHARM REV CODE 636 W HCPCS: Performed by: TRANSPLANT SURGERY

## 2025-06-11 PROCEDURE — 25000003 PHARM REV CODE 250: Performed by: NURSE PRACTITIONER

## 2025-06-11 PROCEDURE — 25000003 PHARM REV CODE 250: Performed by: PHYSICIAN ASSISTANT

## 2025-06-11 PROCEDURE — 25000003 PHARM REV CODE 250: Performed by: TRANSPLANT SURGERY

## 2025-06-11 PROCEDURE — 83735 ASSAY OF MAGNESIUM: CPT | Performed by: TRANSPLANT SURGERY

## 2025-06-11 RX ORDER — TACROLIMUS 1 MG/1
7 CAPSULE ORAL EVERY 12 HOURS
Qty: 420 CAPSULE | Refills: 11 | Status: SHIPPED | OUTPATIENT
Start: 2025-06-11 | End: 2025-06-12 | Stop reason: DRUGHIGH

## 2025-06-11 RX ORDER — POTASSIUM CHLORIDE 20 MEQ/1
20 TABLET, EXTENDED RELEASE ORAL ONCE
Status: COMPLETED | OUTPATIENT
Start: 2025-06-11 | End: 2025-06-11

## 2025-06-11 RX ORDER — OXYCODONE HYDROCHLORIDE 10 MG/1
10 TABLET ORAL EVERY 4 HOURS PRN
Qty: 30 TABLET | Refills: 0 | Status: SHIPPED | OUTPATIENT
Start: 2025-06-11

## 2025-06-11 RX ORDER — TACROLIMUS 1 MG/1
7 CAPSULE ORAL EVERY 12 HOURS
Qty: 420 CAPSULE | Refills: 11 | Status: CANCELLED | OUTPATIENT
Start: 2025-06-11 | End: 2026-06-11

## 2025-06-11 RX ORDER — LABETALOL 100 MG/1
300 TABLET, FILM COATED ORAL EVERY 12 HOURS
Status: DISCONTINUED | OUTPATIENT
Start: 2025-06-11 | End: 2025-06-11 | Stop reason: HOSPADM

## 2025-06-11 RX ORDER — PREDNISONE 5 MG/1
TABLET ORAL
Qty: 75 TABLET | Refills: 11 | Status: SHIPPED | OUTPATIENT
Start: 2025-06-11

## 2025-06-11 RX ORDER — OXYBUTYNIN CHLORIDE 5 MG/1
5 TABLET ORAL 3 TIMES DAILY PRN
Qty: 60 TABLET | Refills: 1 | Status: SHIPPED | OUTPATIENT
Start: 2025-06-11 | End: 2026-06-11

## 2025-06-11 RX ORDER — TACROLIMUS 1 MG/1
2 CAPSULE ORAL ONCE
Status: COMPLETED | OUTPATIENT
Start: 2025-06-11 | End: 2025-06-11

## 2025-06-11 RX ORDER — TACROLIMUS 1 MG/1
7 CAPSULE ORAL 2 TIMES DAILY
Status: DISCONTINUED | OUTPATIENT
Start: 2025-06-11 | End: 2025-06-11 | Stop reason: HOSPADM

## 2025-06-11 RX ORDER — METHOCARBAMOL 500 MG/1
500 TABLET, FILM COATED ORAL 4 TIMES DAILY PRN
Qty: 40 TABLET | Refills: 0 | Status: SHIPPED | OUTPATIENT
Start: 2025-06-11 | End: 2025-06-21

## 2025-06-11 RX ORDER — VALGANCICLOVIR 450 MG/1
450 TABLET, FILM COATED ORAL DAILY
Qty: 30 TABLET | Refills: 2 | Status: SHIPPED | OUTPATIENT
Start: 2025-06-11 | End: 2025-09-09

## 2025-06-11 RX ORDER — LABETALOL 300 MG/1
300 TABLET, FILM COATED ORAL 2 TIMES DAILY
Qty: 60 TABLET | Refills: 5 | Status: SHIPPED | OUTPATIENT
Start: 2025-06-11

## 2025-06-11 RX ADMIN — METHOCARBAMOL 500 MG: 500 TABLET ORAL at 01:06

## 2025-06-11 RX ADMIN — TACROLIMUS 2 MG: 1 CAPSULE ORAL at 10:06

## 2025-06-11 RX ADMIN — OXYCODONE HYDROCHLORIDE 10 MG: 10 TABLET ORAL at 05:06

## 2025-06-11 RX ADMIN — ACETAMINOPHEN 650 MG: 325 TABLET ORAL at 05:06

## 2025-06-11 RX ADMIN — OXYCODONE HYDROCHLORIDE 10 MG: 10 TABLET ORAL at 08:06

## 2025-06-11 RX ADMIN — LABETALOL HYDROCHLORIDE 200 MG: 200 TABLET, FILM COATED ORAL at 08:06

## 2025-06-11 RX ADMIN — NIFEDIPINE 60 MG: 30 TABLET, FILM COATED, EXTENDED RELEASE ORAL at 08:06

## 2025-06-11 RX ADMIN — Medication 1 TABLET: at 08:06

## 2025-06-11 RX ADMIN — TACROLIMUS 5 MG: 1 CAPSULE ORAL at 08:06

## 2025-06-11 RX ADMIN — ONDANSETRON 4 MG: 2 INJECTION INTRAMUSCULAR; INTRAVENOUS at 04:06

## 2025-06-11 RX ADMIN — POTASSIUM CHLORIDE 20 MEQ: 1500 TABLET, EXTENDED RELEASE ORAL at 08:06

## 2025-06-11 RX ADMIN — OXYCODONE HYDROCHLORIDE 5 MG: 5 TABLET ORAL at 01:06

## 2025-06-11 RX ADMIN — MUPIROCIN 1 G: 20 OINTMENT TOPICAL at 08:06

## 2025-06-11 RX ADMIN — DOCUSATE SODIUM 100 MG: 100 CAPSULE, LIQUID FILLED ORAL at 08:06

## 2025-06-11 RX ADMIN — PREDNISONE 20 MG: 20 TABLET ORAL at 08:06

## 2025-06-11 RX ADMIN — MYCOPHENOLATE MOFETIL 1000 MG: 250 CAPSULE ORAL at 08:06

## 2025-06-11 RX ADMIN — HEPARIN SODIUM 5000 UNITS: 5000 INJECTION INTRAVENOUS; SUBCUTANEOUS at 06:06

## 2025-06-11 RX ADMIN — METHOCARBAMOL 500 MG: 500 TABLET ORAL at 08:06

## 2025-06-11 NOTE — CARE UPDATE
KARTHIK drain removal: Site cleaned with alcohol, 1% lidocaine used for local anesthestic. 3-0 prolene used for suture. Drain removed without difficulty. Drain tip intact. Patient tolerated procedure well.  Will continue to monitor for any complications.

## 2025-06-11 NOTE — PROGRESS NOTES
Transplant Coordinator  6/9/25-6/11/25    Pt received DBD kidney transplant 6/9/25. Thymo induction, CMV D- R +    Cr trending down 3.1 at d/c. Great UOP    RLQ incision with dermabond ROBERTO. PD cath was removed.    Labs 6/12/25 & RTC to met with coordinator/pharmD

## 2025-06-11 NOTE — PROGRESS NOTES
Discharge Note:    SW met with pt and mother in room to discuss discharge plan and to assess needs. Pt reports coping adequately at this time and presents as alert and oriented x4 and states is in high spirits.      Pt scheduled to discharge to the [facility] (pt's nightly rate is $0 and will check-in June 11-June18) under the care of mother Sarah.     SW reviewed discharge plan with pt. Pt aware of and involved in discharge plan. Sarah to transport pt.  Pt denies having any concerns at this time as well. Pt verbalized understanding and agreement with information reviewed, social work availability, and how to assess available resources as needed. SW remains available.

## 2025-06-11 NOTE — PROGRESS NOTES
DISCHARGE NOTE:    Jeffrey Moreno is a 29 y.o. male s/p LEFT KIDNEY     Donation after Brain Death     transplant on 6/9/2025 (Kidney) for ESRD secondary to Hypertensive Nephrosclerosis.      Past Medical History:   Diagnosis Date    Anemia     Disorder of kidney and ureter     Enteritis     Hypertension     LVH (left ventricular hypertrophy)     ZAVALETA (nonalcoholic steatohepatitis)     Prostatitis     Secondary hyperparathyroidism        Hospital Course: Patient's hospital course uncomplicated and patient ready for dc.    Allergies: Review of patient's allergies indicates:  No Known Allergies    Patient Pharmacy: [facility]    Discharge Medications:     Medication List        START taking these medications      famotidine 20 MG tablet  Commonly known as: PEPCID  Take 1 tablet (20 mg total) by mouth every evening. Stop 7/11/25     methocarbamoL 500 MG Tab  Commonly known as: Robaxin  Take 1 tablet (500 mg total) by mouth 4 (four) times daily as needed (muscle spasms).     multivitamin Tab  Take 1 tablet by mouth once daily.     mycophenolate 250 mg Cap  Commonly known as: CELLCEPT  Take 4 capsules (1,000 mg total) by mouth 2 (two) times daily.     NIFEdipine 60 MG (OSM) 24 hr tablet  Commonly known as: PROCARDIA-XL  Take 1 tablet (60 mg total) by mouth 2 (two) times a day.  Replaces: NIFEdipine 90 MG Tbsr     oxybutynin 5 MG Tab  Commonly known as: DITROPAN  Take 1 tablet (5 mg total) by mouth 3 (three) times daily as needed (Bladder spasms).     oxyCODONE 10 mg Tab immediate release tablet  Commonly known as: ROXICODONE  Take 1 tablet (10 mg total) by mouth every 4 (four) hours as needed for Pain.     predniSONE 5 MG tablet  Commonly known as: DELTASONE  Take 20mg by mouth once daily from 6/11/25-6/17;  Take 15mg daily from 6/18-6/24;  Take 10mg daily from  6/25-7/1/25;  Take 5mg daily thereafter starting 7/2/25.     sulfamethoxazole-trimethoprim 400-80mg 400-80 mg per tablet  Commonly known as: BACTRIM,SEPTRA  Take 1  tablet by mouth every morning. Stop 12/6/25     tacrolimus 1 MG Cap  Commonly known as: PROGRAF  Take 7 capsules (7 mg total) by mouth every 12 (twelve) hours.     valGANciclovir 450 mg Tab  Commonly known as: VALCYTE  Take 1 tablet (450 mg total) by mouth once daily. Stop 9/7/25            CHANGE how you take these medications      labetaloL 300 MG tablet  Commonly known as: NORMODYNE  Take 1 tablet (300 mg total) by mouth 2 (two) times daily.  What changed:   medication strength  how much to take            STOP taking these medications      calcium acetate(phosphat bind) 667 mg capsule  Commonly known as: PHOSLO     folic acid 1 MG tablet  Commonly known as: FOLVITE     gabapentin 100 MG capsule  Commonly known as: NEURONTIN     gentamicin 0.1 % cream  Commonly known as: GARAMYCIN     hydrALAZINE 25 MG tablet  Commonly known as: APRESOLINE     losartan 50 MG tablet  Commonly known as: COZAAR     minoxidiL 2.5 MG tablet  Commonly known as: LONITEN     NIFEdipine 90 MG Tbsr  Commonly known as: ADALAT CC  Replaced by: NIFEdipine 60 MG (OSM) 24 hr tablet     pantoprazole 40 MG tablet  Commonly known as: PROTONIX               Where to Get Your Medications        These medications were sent to [facility] [address] [address]      Hours: Always Open Phone: [phone]   famotidine 20 MG tablet  labetaloL 300 MG tablet  methocarbamoL 500 MG Tab  multivitamin Tab  mycophenolate 250 mg Cap  NIFEdipine 60 MG (OSM) 24 hr tablet  oxybutynin 5 MG Tab  oxyCODONE 10 mg Tab immediate release tablet  predniSONE 5 MG tablet  sulfamethoxazole-trimethoprim 400-80mg 400-80 mg per tablet  tacrolimus 1 MG Cap  valGANciclovir 450 mg Tab          Pharmacy Interventions/Recommendations:  1) Transplant Immunosuppression: Induction Thymo, and maintenance Prograf 7/7, MMF 1000mg BID, and Pred taper    2) Opportunistic Infection prophylaxis: Bactrim and Valcyte daily.    3) Osteoporosis Prevention  measures (liver txp): NA    4) Patient Counseling/Education: Demonstrated the use of the BP cuff, thermometer.    5) Follow-Up/Discharge Needs:  None    6) Patient Assistance Information: None    7) The following medications have been placed on HOLD and should be restarted in the outpatient setting (when appropriate): None    Jeffrey and his caregiver verbalized their understanding and had the opportunity to ask questions.

## 2025-06-11 NOTE — PLAN OF CARE
Recommendations     1.) Recommend continuing with Renal Non HD diet as tolerated.                  - may liberalize to Low Na diet as renal labs begin to normalize.                  - RN staff: please continue to document PO intake in the flowsheets      2.) May add Novasource Renal daily to provide an additional 475 kcal and 22g PRO.      3.) RD to monitor wt, PO intake, skin, labs.       Goals: To meet % of EEN/EPN by next RD f/u   Nutrition Goal Status: progressing towards goal  Communication of RD Recs:  (POC)     Nutrition Discharge Planning     Nutrition Discharge Planning: Therapeutic diet (comments), Other (comments)  Therapeutic diet (comments): Low Na  Other (comments): Food Safety Nutrition Education provided on 6/10. Discussed cooking foods to proper temperatures, avoiding foods with drug interactions, handling produce, meal planning tips, food safety resources, foods to avoid, and foods that are recommended.

## 2025-06-11 NOTE — PROGRESS NOTES
EDUCATION NOTE:    Met with Jeffrey Moreno and his caregivers to provide teaching re: immunosuppressant medications.  Reviewed medication section of the Kidney Transplant Education book that was provided.  Emphasized the importance of compliance, role of the blue medication card, concerns for drug interactions, and process of obtaining refills.  Counseled regarding Prograf, Cellcept , prednisone, including directions for use, monitoring, how to handle missed doses, and side effects.  Mr. Moreno and his caregiver verbalized understanding and had the opportunity to ask questions.

## 2025-06-11 NOTE — PLAN OF CARE
"Pt AAOx4. VSS on RA, BP managed well with PO regimen. NS infusing @100cc/h, fluids stopped @23:30. Lobato intact with pink tinged UOP. Pt with complaints of "having to urinate" and abdominal pain-- PRN ditropan/oxy administered with no relief. Pt then with cessation of urine for 1 hr, lobato irrigated with 50cc sterile water and flow of clear urine returned. RODOLFO MILES informed, see flowsheet for I/O. RLQ incision and prior PD site ROBERTO with dermabond, site CDI. RLQ KARTHIK drain with serous output--see flowsheet.  Pt with 1 BM overnight, ambulated to restroom with 1x assist --- remains free from falls/injury. BG monitored AC/HS, no indications for SSI. Self meds pulled by pt with assistance from his mother with 100% accuracy. Plan to d/c lobato this am. Bed in lowest locked position, call light within reach, mother/grandmother at bedside attentive to pt, POC ongoing.     "

## 2025-06-11 NOTE — NURSING
Sullivan catheter dc'd at this time per order, catheter tip intact. Pt instructed to void by 12pm and to notify RN upon first void. Urinal provided.

## 2025-06-11 NOTE — PROGRESS NOTES
Transplant Teaching Book given to patient, Jeffrey Moreno, on 06/11/2025.  During the course of the hospital stay the patient received information regarding kidney transplant. Teaching and instruction were completed.  Areas that were discussed included: how to contact the Transplant Team, the importance of measuring intake of fluids and urine output, and monitoring vital signs such as blood pressure, temperature, and daily weights.  Parameters for which to report abnormal findings were given.  Appointment were provided along with the rational for the importance of lab work and clinic visits.  A written medication list was provided.  The importance of immunosuppressive medications, their common side effects, and treatment to prevent or minimize side effects has been reviewed.  Signs and symptoms of rejection and infection along with various treatments were reviewed.  The need to avoid infection was discussed.  Wound care and special consideration regarding activities of daily living were explained.  Written and verbal teaching of the above information was given.     Discussed with the patient and caregiver the importance of maintaining COVID-19 precautions; wearing a mask, good handwashing, and social distancing.  Also, to report any signs or symptoms (fever, difficulty breathing, loss of taste/smell, etc.), suspected exposure, or COVID testing, immediately to the transplant program.

## 2025-06-11 NOTE — PROGRESS NOTES
DC instructions reviewed with pt and family member. Both verbalized understanding. Prescriptions delivered bedside. PIV, WERO MASSEY, and tele dc'd. Pt off of floor via wheelchair, NAD.

## 2025-06-11 NOTE — PROGRESS NOTES
"[facility] - Transplant Stepdown  Adult Nutrition  Progress Note    SUMMARY       Recommendations    1.) Recommend continuing with Renal Non HD diet as tolerated.                  - may liberalize to Low Na diet as renal labs begin to normalize.                  - RN staff: please continue to document PO intake in the flowsheets      2.) May add Novasource Renal daily to provide an additional 475 kcal and 22g PRO.      3.) RD to monitor wt, PO intake, skin, labs.      Goals: To meet % of EEN/EPN by next RD f/u   Nutrition Goal Status: progressing towards goal  Communication of RD Recs:  (POC)    Nutrition Discharge Planning    Nutrition Discharge Planning: Therapeutic diet (comments), Other (comments)  Therapeutic diet (comments): Low Na  Other (comments): Food Safety Nutrition Education provided on 6/10. Discussed cooking foods to proper temperatures, avoiding foods with drug interactions, handling produce, meal planning tips, food safety resources, foods to avoid, and foods that are recommended.    Reason for Assessment    Reason For Assessment: RD follow-up  Diagnosis: renal disease (ESRD w/ HD; s/p DBD kidney tx)    General Information Comments: RD f/u: no noted n/v/d/c. PO intake remains low, ~25%per RN charting. No other questions or concerns were identified. RD team to monitor and f/u as needed.     Nutrition Related Social Determinants of Health: SDOH: None Identified     Nutrition/Diet History    Spiritual, Cultural Beliefs, Quaker Practices, Values that Affect Care: no  Food Allergies: NKFA  Factors Affecting Nutritional Intake: abdominal distension    Anthropometrics    Height: 6' 1" (185.4 cm)  Height (inches): 73 in  Weight: 80.2 kg (176 lb 12.9 oz)  Weight (lb): 176.81 lb  Weight Method: Standard Scale  Ideal Body Weight (IBW), Male: 184 lb  % Ideal Body Weight, Male (lb): 93.1 %  BMI (Calculated): 23.3  BMI Grade: 18.5-24.9 - normal  Usual Body Weight (UBW), k.27 kg  % Usual Body Weight: " 100.77  % Weight Change From Usual Weight: 0.56 %    Lab/Procedures/Meds    Pertinent Labs Reviewed: reviewed  Pertinent Labs Comments: K: 3.4, BUN: 34, Cr: 3.1, GFR: 27, phos: 2.5  Pertinent Medications Reviewed: reviewed  Pertinent Medications Comments: Bisacodyl, docusate, famotidine, heparin, MVI, mycophenolate, prednisone, tacrolimus    Estimated/Assessed Needs    Weight Used For Calorie Calculations: 77.7 kg (171 lb 4.8 oz)  Energy Calorie Requirements (kcal): 1943- 2331 kcal  Energy Need Method: Kcal/kg (25-30 kcal/kg)  Protein Requirements: 93- 117g (1.2-1.5g/kg)  Weight Used For Protein Calculations: 77.7 kg (171 lb 4.8 oz)  Fluid Requirements (mL): as per MD or RDA  Estimated Fluid Requirement Method: RDA Method  RDA Method (mL): 1943    Nutrition Prescription Ordered    Current Diet Order: Renal Non-Dialysis    Evaluation of Received Nutrient/Fluid Intake    I/O: + 733 since admit  Energy Calories Required: not meeting needs  Protein Required: not meeting needs  Fluid Required:  (as per MD)  Comments: LBM 6/10  Tolerance: tolerating  % Intake of Estimated Energy Needs: 25 - 50 %  % Meal Intake: 25 - 50 %    PES Statement  Increased nutrient needs (protein/energy) related to Wound healing (increased metabolic needs) as evidenced by Other (comment) (s/p DBD kidney tx)  Status: Continues    Nutrition Risk    Level of Risk/Frequency of Follow-up: low - moderate     Monitor and Evaluation    Monitor and Evaluation: Food and beverage intake, Diet order, Food and nutrition knowledge, Weight, Beliefs and attitudes, Gastrointestinal profile, Electrolyte and renal panel, Glucose/endocrine profile, Skin     Nutrition Follow-Up    RD Follow-up?: Yes

## 2025-06-12 ENCOUNTER — RESULTS FOLLOW-UP (OUTPATIENT)
Dept: TRANSPLANT | Facility: HOSPITAL | Age: 30
End: 2025-06-12
Payer: MEDICARE

## 2025-06-12 ENCOUNTER — PATIENT OUTREACH (OUTPATIENT)
Dept: ADMINISTRATIVE | Facility: CLINIC | Age: 30
End: 2025-06-12
Payer: MEDICARE

## 2025-06-12 ENCOUNTER — CLINICAL SUPPORT (OUTPATIENT)
Dept: TRANSPLANT | Facility: CLINIC | Age: 30
End: 2025-06-12
Payer: MEDICARE

## 2025-06-12 ENCOUNTER — PATIENT MESSAGE (OUTPATIENT)
Dept: TRANSPLANT | Facility: CLINIC | Age: 30
End: 2025-06-12
Payer: MEDICARE

## 2025-06-12 ENCOUNTER — TELEPHONE (OUTPATIENT)
Dept: INFUSION THERAPY | Facility: HOSPITAL | Age: 30
End: 2025-06-12
Payer: MEDICARE

## 2025-06-12 ENCOUNTER — LAB VISIT (OUTPATIENT)
Dept: LAB | Facility: HOSPITAL | Age: 30
End: 2025-06-12
Attending: STUDENT IN AN ORGANIZED HEALTH CARE EDUCATION/TRAINING PROGRAM
Payer: MEDICARE

## 2025-06-12 VITALS
HEIGHT: 72 IN | RESPIRATION RATE: 20 BRPM | DIASTOLIC BLOOD PRESSURE: 81 MMHG | SYSTOLIC BLOOD PRESSURE: 132 MMHG | BODY MASS INDEX: 23.17 KG/M2 | TEMPERATURE: 99 F | HEART RATE: 86 BPM | OXYGEN SATURATION: 97 % | HEART RATE: 86 BPM | SYSTOLIC BLOOD PRESSURE: 132 MMHG | RESPIRATION RATE: 20 BRPM | WEIGHT: 171.06 LBS | BODY MASS INDEX: 23.17 KG/M2 | WEIGHT: 171.06 LBS | DIASTOLIC BLOOD PRESSURE: 81 MMHG | HEIGHT: 72 IN | OXYGEN SATURATION: 97 % | TEMPERATURE: 99 F

## 2025-06-12 DIAGNOSIS — T86.10 COMPLICATION OF TRANSPLANTED KIDNEY, UNSPECIFIED COMPLICATION: ICD-10-CM

## 2025-06-12 DIAGNOSIS — E78.49 STEROID-INDUCED HYPERLIPIDEMIA: ICD-10-CM

## 2025-06-12 DIAGNOSIS — E55.9 VITAMIN D DEFICIENCY: ICD-10-CM

## 2025-06-12 DIAGNOSIS — N39.0 URINARY TRACT INFECTION WITHOUT HEMATURIA, SITE UNSPECIFIED: ICD-10-CM

## 2025-06-12 DIAGNOSIS — Z94.0 IMMUNOSUPPRESSIVE MANAGEMENT ENCOUNTER FOLLOWING KIDNEY TRANSPLANT: ICD-10-CM

## 2025-06-12 DIAGNOSIS — Z79.899 IMMUNOSUPPRESSIVE MANAGEMENT ENCOUNTER FOLLOWING KIDNEY TRANSPLANT: ICD-10-CM

## 2025-06-12 DIAGNOSIS — Z94.0 KIDNEY REPLACED BY TRANSPLANT: Primary | ICD-10-CM

## 2025-06-12 DIAGNOSIS — E83.39 HYPOPHOSPHATEMIA: Primary | ICD-10-CM

## 2025-06-12 DIAGNOSIS — E83.39 HYPOPHOSPHATEMIA: ICD-10-CM

## 2025-06-12 DIAGNOSIS — N25.81 SECONDARY HYPERPARATHYROIDISM OF RENAL ORIGIN: ICD-10-CM

## 2025-06-12 DIAGNOSIS — D51.0 PERNICIOUS ANEMIA: ICD-10-CM

## 2025-06-12 DIAGNOSIS — Z91.89 INCREASED INFECTION RISK: ICD-10-CM

## 2025-06-12 DIAGNOSIS — Z94.0 KIDNEY REPLACED BY TRANSPLANT: ICD-10-CM

## 2025-06-12 DIAGNOSIS — B25.9 CYTOMEGALOVIRUS INFECTION, UNSPECIFIED CYTOMEGALOVIRAL INFECTION TYPE: ICD-10-CM

## 2025-06-12 DIAGNOSIS — E83.52 HYPERCALCEMIA: ICD-10-CM

## 2025-06-12 DIAGNOSIS — G62.9 NEUROPATHY: Primary | ICD-10-CM

## 2025-06-12 DIAGNOSIS — N28.9 KIDNEY DISEASE: ICD-10-CM

## 2025-06-12 DIAGNOSIS — T38.0X5A STEROID-INDUCED HYPERLIPIDEMIA: ICD-10-CM

## 2025-06-12 DIAGNOSIS — R80.9 PROTEINURIA, UNSPECIFIED TYPE: ICD-10-CM

## 2025-06-12 LAB
25(OH)D3+25(OH)D2 SERPL-MCNC: 10 NG/ML (ref 30–96)
ABSOLUTE EOSINOPHIL (OHS): 0.29 K/UL
ABSOLUTE MONOCYTE (OHS): 0.46 K/UL (ref 0.3–1)
ABSOLUTE NEUTROPHIL COUNT (OHS): 3.53 K/UL (ref 1.8–7.7)
ALBUMIN SERPL BCP-MCNC: 3 G/DL (ref 3.5–5.2)
ANION GAP (OHS): 6 MMOL/L (ref 8–16)
B CELL RESULTS - XM ALLO 1: NEGATIVE
B CELL RESULTS - XM ALLO 2: NEGATIVE
B CELL RESULTS - XM AUTO: NORMAL
B MCS AVERAGE - XM ALLO 1: -4
B MCS AVERAGE - XM ALLO 2: -5
B MCS AVERAGE - XM AUTO: -60
BACTERIA FLD AEROBE CULT: NO GROWTH
BASOPHILS # BLD AUTO: 0.02 K/UL
BASOPHILS NFR BLD AUTO: 0.4 %
BUN SERPL-MCNC: 18 MG/DL (ref 6–20)
CALCIUM SERPL-MCNC: 9.2 MG/DL (ref 8.7–10.5)
CHLORIDE SERPL-SCNC: 107 MMOL/L (ref 95–110)
CO2 SERPL-SCNC: 26 MMOL/L (ref 23–29)
CREAT SERPL-MCNC: 1.8 MG/DL (ref 0.5–1.4)
DONOR MRN 1: NORMAL
DONOR MRN 2: NORMAL
ERYTHROCYTE [DISTWIDTH] IN BLOOD BY AUTOMATED COUNT: 16 % (ref 11.5–14.5)
FXMAL TESTING DATE 1: NORMAL
FXMAL TESTING DATE 2: NORMAL
FXMAU TESTING DATE: NORMAL
GFR SERPLBLD CREATININE-BSD FMLA CKD-EPI: 52 ML/MIN/1.73/M2
GLUCOSE SERPL-MCNC: 85 MG/DL (ref 70–110)
GRAM STN SPEC: NORMAL
GRAM STN SPEC: NORMAL
HCT VFR BLD AUTO: 34.1 % (ref 40–54)
HGB BLD-MCNC: 10.8 GM/DL (ref 14–18)
HLA FLOW CROSSMATCH (ALLO) INTERPRETATION: NORMAL
HPRA INTERPRETATION: NORMAL
IMM GRANULOCYTES # BLD AUTO: 0.02 K/UL (ref 0–0.04)
IMM GRANULOCYTES NFR BLD AUTO: 0.4 % (ref 0–0.5)
LYMPHOCYTES # BLD AUTO: 0.14 K/UL (ref 1–4.8)
MAGNESIUM SERPL-MCNC: 2 MG/DL (ref 1.6–2.6)
MCH RBC QN AUTO: 28.1 PG (ref 27–31)
MCHC RBC AUTO-ENTMCNC: 31.7 G/DL (ref 32–36)
MCV RBC AUTO: 89 FL (ref 82–98)
NUCLEATED RBC (/100WBC) (OHS): 0 /100 WBC
PHOSPHATE SERPL-MCNC: 1.5 MG/DL (ref 2.7–4.5)
PLATELET # BLD AUTO: 224 K/UL (ref 150–450)
PMV BLD AUTO: 9.6 FL (ref 9.2–12.9)
POTASSIUM SERPL-SCNC: 3.8 MMOL/L (ref 3.5–5.1)
PTH-INTACT SERPL-MCNC: 135.1 PG/ML (ref 9–77)
RBC # BLD AUTO: 3.85 M/UL (ref 4.6–6.2)
RELATIVE EOSINOPHIL (OHS): 6.5 %
RELATIVE LYMPHOCYTE (OHS): 3.1 % (ref 18–48)
RELATIVE MONOCYTE (OHS): 10.3 % (ref 4–15)
RELATIVE NEUTROPHIL (OHS): 79.3 % (ref 38–73)
SERUM COLLECTION DT - XM ALLO 1: NORMAL
SERUM COLLECTION DT - XM ALLO 2: NORMAL
SERUM COLLECTION DT - XM AUTO: NORMAL
SODIUM SERPL-SCNC: 139 MMOL/L (ref 136–145)
T CELL RESULTS - XM ALLO 1: NEGATIVE
T CELL RESULTS - XM ALLO 2: NEGATIVE
T CELL RESULTS - XM AUTO: NORMAL
T MCS AVERAGE - XM ALLO 1: 5.6
T MCS AVERAGE - XM ALLO 2: 0.9
T MCS AVERAGE - XM AUTO: -23.9
TACROLIMUS BLD-MCNC: 3.9 NG/ML (ref 5–15)
WBC # BLD AUTO: 4.46 K/UL (ref 3.9–12.7)

## 2025-06-12 PROCEDURE — 82306 VITAMIN D 25 HYDROXY: CPT

## 2025-06-12 PROCEDURE — 99999 PR PBB SHADOW E&M-EST. PATIENT-LVL III: CPT | Mod: PBBFAC,,,

## 2025-06-12 PROCEDURE — 83735 ASSAY OF MAGNESIUM: CPT

## 2025-06-12 PROCEDURE — 83970 ASSAY OF PARATHORMONE: CPT

## 2025-06-12 PROCEDURE — 80197 ASSAY OF TACROLIMUS: CPT

## 2025-06-12 PROCEDURE — 85025 COMPLETE CBC W/AUTO DIFF WBC: CPT

## 2025-06-12 PROCEDURE — 36415 COLL VENOUS BLD VENIPUNCTURE: CPT

## 2025-06-12 PROCEDURE — 80069 RENAL FUNCTION PANEL: CPT

## 2025-06-12 RX ORDER — GABAPENTIN 100 MG/1
100 CAPSULE ORAL 3 TIMES DAILY
Qty: 270 CAPSULE | Refills: 3 | Status: SHIPPED | OUTPATIENT
Start: 2025-06-12

## 2025-06-12 RX ORDER — TACROLIMUS 1 MG/1
10 CAPSULE ORAL EVERY 12 HOURS
Qty: 600 CAPSULE | Refills: 11 | Status: SHIPPED | OUTPATIENT
Start: 2025-06-12 | End: 2026-06-12

## 2025-06-12 RX ORDER — CHOLECALCIFEROL (VITAMIN D3) 50 MCG
2000 TABLET ORAL DAILY
Qty: 30 TABLET | Refills: 6 | Status: SHIPPED | OUTPATIENT
Start: 2025-06-12 | End: 2026-06-12

## 2025-06-12 RX ORDER — SODIUM CHLORIDE 0.9 % (FLUSH) 0.9 %
10 SYRINGE (ML) INJECTION
OUTPATIENT
Start: 2025-06-13

## 2025-06-12 NOTE — PROGRESS NOTES
Clinic Note: First Return to Clinic Post-Kidney Transplant    Jeffrey Moreno  is a 29 y.o. male  S/p LEFT KIDNEY transplant on 6/9/2025 (Kidney) for Hypertensive Nephrosclerosis.      Discharge Course (Issues/Concerns): Patient presents to clinic for their first visit post-transplant. Patient reports burning pain in his feet and worsening incisional pain.    Objective:   Vitals:    06/12/25 1003   BP: 132/81   Pulse: 86   Resp: 20   Temp: 99.1 °F (37.3 °C)     Met with patient and his caregiver in the clinic to review current medication list.     Current Medications[1]    Pharmacy Interventions/Recommendations:     1) Graft Function & Immunosuppression Issues: immunos: Prograf 7/7, MMF 1000mg BID, and Pred taper. Labs are currently in process.     2) Opportunistic Infection prophylaxis:   PCP ppx: Bactrim EOT: 12/6/25  CMV ppx: Valcyte EOT: 9/7/25  Fungal ppx: N/A    3) Donor Serologies & Monitoring:     Donor CMV Status: Negative  Donor HCV Status: Negative  Donor HBcAb: Negative  Donor HBV GABRIEL: Organ record is missing.  Donor HCV GABRIEL: Negative     4) Pain Management & Bowel Regimen: Patient moaning in pain. Told him he can restart his gabapentin for burning pain in his feet, patient has home rx with him. His family member is going to  tylenol after the appointment to add to the patients regimen. Patient reports no issues with bowel movements.     5) Blood Pressure Management: current regimen: labetalol 300 mg bid, nifedipine 60 mg bid.patient reports SBP <130 at home.     6) Blood Sugar Management & Follow-up: N/A    7) Electrolyte Management: labs are currently in process.    8) Osteoporosis Prevention Strategy (Liver Transplant): N/A    9) OTHER medication follow-up (patient assistance, held medications, etc): no other issues.     10) Reinforced medication education conducted in the hospital, including medication indications, dosing, administration, side effects, monitoring-- including timing of  immunosuppressant levels.     Patient received their FIRST fill of medications from [facility].  Discussed the process for obtaining refills of medications, including verifying the dose and mailing address to have medications delivered.     Jeffrey and his caregivers verbalized understanding and had the opportunity to ask questions.         [1]   Current Outpatient Medications   Medication Sig Dispense Refill    famotidine (PEPCID) 20 MG tablet Take 1 tablet (20 mg total) by mouth every evening. Stop 7/11/25 30 tablet 0    labetaloL (NORMODYNE) 300 MG tablet Take 1 tablet (300 mg total) by mouth 2 (two) times daily. 60 tablet 5    methocarbamoL (ROBAXIN) 500 MG Tab Take 1 tablet (500 mg total) by mouth 4 (four) times daily as needed (muscle spasms). 40 tablet 0    multivitamin Tab Take 1 tablet by mouth once daily. 30 tablet 5    mycophenolate (CELLCEPT) 250 mg Cap Take 4 capsules (1,000 mg total) by mouth 2 (two) times daily. 240 capsule 11    NIFEdipine (PROCARDIA-XL) 60 MG (OSM) 24 hr tablet Take 1 tablet (60 mg total) by mouth 2 (two) times a day. 60 tablet 11    oxybutynin (DITROPAN) 5 MG Tab Take 1 tablet (5 mg total) by mouth 3 (three) times daily as needed (Bladder spasms). 60 tablet 1    oxyCODONE (ROXICODONE) 10 mg Tab immediate release tablet Take 1 tablet (10 mg total) by mouth every 4 (four) hours as needed for Pain. 30 tablet 0    predniSONE (DELTASONE) 5 MG tablet Take 20mg by mouth once daily from 6/11/25-6/17;  Take 15mg daily from 6/18-6/24;  Take 10mg daily from  6/25-7/1/25;  Take 5mg daily thereafter starting 7/2/25. 75 tablet 11    sulfamethoxazole-trimethoprim 400-80mg (BACTRIM,SEPTRA) 400-80 mg per tablet Take 1 tablet by mouth every morning. Stop 12/6/25 30 tablet 5    tacrolimus (PROGRAF) 1 MG Cap Take 7 capsules (7 mg total) by mouth every 12 (twelve) hours. 420 capsule 11    valGANciclovir (VALCYTE) 450 mg Tab Take 1 tablet (450 mg total) by mouth once daily. Stop 9/7/25 30 tablet 2     No  current facility-administered medications for this visit.

## 2025-06-12 NOTE — TELEPHONE ENCOUNTER
Ilya Murphy & MsBarry Sarah,     Per our telephone conversation, Dr. Tyler reviewed your 6/12/25 lab results. Your phosphorus level is really low. He wants you to start taking a phosphorus supplement called K Phos Neutral 500mg (2 of the 250mg tablets) twice a day. Take it at least 2 hours apart from your other 8AM & 8PM medication. You may take it at 6AM & 6PM or 10AM & 10PM, whichever you prefer.   Also start taking Vitamin D 2,000Units daily. Both prescriptions will be sent to [facility].     Lastly, you'll need to receive an IV phosphorus infusion tomorrow since your phosphorus level is really low. Someone will contact you from the Transplant infusion center to schedule the appointment. Plan to be there for about at least 5 or 6 hours for the infusion. So you can bring snacks & something to drink.    Increase your food intake of high phosphorus foods. You can refer to the list of high phosphorus & high potassium foods listed in the kidney transplant book.     Your prograf level 3.9 is lower than it should be. Dr. Tyler wants you to increase your Prograf/tacrolimus dose to 10mg twice daily starting with tonight's dose.     Thanks,     Kaylah

## 2025-06-12 NOTE — TELEPHONE ENCOUNTER
Summary   Medication refill request   Communication   PT mom calling req for refill of Gabapentin 100 mg (#X day). PT is almost out please send script [facility] [place] [facility]. Please advise when sent            [facility]      [address]      [facility] [address]      Phone: [phone] Fax: [phone]            Patient can be contacted @# [phone]

## 2025-06-12 NOTE — TELEPHONE ENCOUNTER
Notified patient of Dr. Tyler's review of 6/123/25 lab results via telephone & [facility] message.     Ilya Murphy & Ms. Smith,     Per our telephone conversation, Dr. Tyler reviewed your 6/12/25 lab results. Your phosphorus level is really low. He wants you to start taking a phosphorus supplement called K Phos Neutral 500mg (2 of the 250mg tablets) twice a day. Take it at least 2 hours apart from your other 8AM & 8PM medication. You may take it at 6AM & 6PM or 10AM & 10PM, whichever you prefer.   Also start taking Vitamin D 2,000Units daily. Both prescriptions will be sent to [facility].     Lastly, you'll need to receive an IV phosphorus infusion tomorrow since your phosphorus level is really low. Someone will contact you from the Transplant infusion center to schedule the appointment. Plan to be there for about at least 5 or 6 hours for the infusion. So you can bring snacks & something to drink.    Increase your food intake of high phosphorus foods. You can refer to the list of high phosphorus & high potassium foods listed in the kidney transplant book.     Your prograf level 3.9 is lower than it should be. Dr. Tyler wants you to increase your Prograf/tacrolimus dose to 10mg twice daily starting with tonight's dose.     Thanks,     Kaylah         ----- Message from Gelacio Tyler MD sent at 6/12/2025 12:23 PM CDT -----  If no COB, CP, fever, weakness, phos can be treated outpatient. Ideally IV phos this week. Start vit d 2000 daily and kphos 500 bid. Tac pending  ----- Message -----  From: Lab, Background User  Sent: 6/12/2025   9:33 AM CDT  To: Gelacio Tyler Jr., MD

## 2025-06-12 NOTE — TELEPHONE ENCOUNTER
-----Spoke with patient to schedule appointment for IVPHOS. Patient agreed to Friday 06/13/2025 time being 9:00am              ----- Message from Kaylah Campos sent at 6/12/2025  1:20 PM CDT -----  Please enter a therapy plan for patient to receive IV phos tomorrow. Thanks.  ----- Message -----  From: Gelacio Tyler Jr., MD  Sent: 6/12/2025  12:23 PM CDT  To: [facility] Post-Kidney Transplant Clinical    If no COB, CP, fever, weakness, phos can be treated outpatient. Ideally IV phos this week. Start vit d 2000 daily and kphos 500 bid. Tac pending  ----- Message -----  From: Lab, Background User  Sent: 6/12/2025   9:33 AM CDT  To: Gelacio Tyler Jr., MD

## 2025-06-13 ENCOUNTER — INFUSION (OUTPATIENT)
Dept: INFUSION THERAPY | Facility: HOSPITAL | Age: 30
End: 2025-06-13
Payer: MEDICARE

## 2025-06-13 VITALS
TEMPERATURE: 98 F | OXYGEN SATURATION: 99 % | BODY MASS INDEX: 22.29 KG/M2 | HEART RATE: 87 BPM | DIASTOLIC BLOOD PRESSURE: 97 MMHG | RESPIRATION RATE: 18 BRPM | SYSTOLIC BLOOD PRESSURE: 143 MMHG | WEIGHT: 168.19 LBS | HEIGHT: 73 IN

## 2025-06-13 DIAGNOSIS — E83.39 HYPOPHOSPHATEMIA: ICD-10-CM

## 2025-06-13 DIAGNOSIS — E83.39 HYPOPHOSPHATEMIA: Primary | ICD-10-CM

## 2025-06-13 LAB — BACTERIA SPEC ANAEROBE CULT: NORMAL

## 2025-06-13 PROCEDURE — 96366 THER/PROPH/DIAG IV INF ADDON: CPT

## 2025-06-13 PROCEDURE — 96365 THER/PROPH/DIAG IV INF INIT: CPT

## 2025-06-13 PROCEDURE — 25000003 PHARM REV CODE 250: Performed by: INTERNAL MEDICINE

## 2025-06-13 RX ORDER — SODIUM CHLORIDE 0.9 % (FLUSH) 0.9 %
10 SYRINGE (ML) INJECTION
OUTPATIENT
Start: 2025-06-13

## 2025-06-13 RX ADMIN — SODIUM PHOSPHATE, MONOBASIC, MONOHYDRATE AND SODIUM PHOSPHATE, DIBASIC ANHYDROUS 30 MMOL: 142; 276 INJECTION, SOLUTION INTRAVENOUS at 10:06

## 2025-06-13 NOTE — PROGRESS NOTES
Na Phosphorus 30 mmol infused via PIV in right hand,over 4 hours,IV removed patient left ambulatory.

## 2025-06-14 ENCOUNTER — HOSPITAL ENCOUNTER (OUTPATIENT)
Facility: HOSPITAL | Age: 30
Discharge: HOME | End: 2025-06-15
Attending: EMERGENCY MEDICINE | Admitting: TRANSPLANT SURGERY
Payer: MEDICARE

## 2025-06-14 DIAGNOSIS — R53.1 WEAKNESS: ICD-10-CM

## 2025-06-14 DIAGNOSIS — R74.01 TRANSAMINITIS: ICD-10-CM

## 2025-06-14 DIAGNOSIS — R03.0 ELEVATED BLOOD PRESSURE READING: Primary | ICD-10-CM

## 2025-06-14 DIAGNOSIS — Z94.0 TRANSPLANTED KIDNEY: ICD-10-CM

## 2025-06-14 DIAGNOSIS — G89.18 POSTOPERATIVE PAIN: ICD-10-CM

## 2025-06-14 DIAGNOSIS — I10 HYPERTENSION: ICD-10-CM

## 2025-06-14 LAB
ABSOLUTE EOSINOPHIL (OHS): 0.16 K/UL
ABSOLUTE MONOCYTE (OHS): 0.58 K/UL (ref 0.3–1)
ABSOLUTE NEUTROPHIL COUNT (OHS): 3.83 K/UL (ref 1.8–7.7)
BASOPHILS # BLD AUTO: 0.03 K/UL
BASOPHILS NFR BLD AUTO: 0.6 %
BIPAP: 0
BUN SERPL-MCNC: 18 MG/DL (ref 6–30)
CHLORIDE SERPL-SCNC: 104 MMOL/L (ref 95–110)
CREAT SERPL-MCNC: 1.4 MG/DL (ref 0.5–1.4)
ERYTHROCYTE [DISTWIDTH] IN BLOOD BY AUTOMATED COUNT: 15.5 % (ref 11.5–14.5)
FIO2: 21 %
GLUCOSE SERPL-MCNC: 100 MG/DL (ref 70–110)
HCT VFR BLD AUTO: 32.4 % (ref 40–54)
HCT VFR BLD CALC: 34 %PCV (ref 36–54)
HGB BLD-MCNC: 10.7 GM/DL (ref 14–18)
IMM GRANULOCYTES # BLD AUTO: 0.03 K/UL (ref 0–0.04)
IMM GRANULOCYTES NFR BLD AUTO: 0.6 % (ref 0–0.5)
LDH SERPL L TO P-CCNC: 1.1 MMOL/L
LYMPHOCYTES # BLD AUTO: 0.31 K/UL (ref 1–4.8)
MCH RBC QN AUTO: 28.3 PG (ref 27–31)
MCHC RBC AUTO-ENTMCNC: 33 G/DL (ref 32–36)
MCV RBC AUTO: 86 FL (ref 82–98)
NUCLEATED RBC (/100WBC) (OHS): 0 /100 WBC
PLATELET # BLD AUTO: 252 K/UL (ref 150–450)
PMV BLD AUTO: 10.2 FL (ref 9.2–12.9)
POC IONIZED CALCIUM: 1.29 MMOL/L (ref 1.06–1.42)
POC PERFORMED BY: NORMAL
POC TCO2 (MEASURED): 25 MMOL/L (ref 23–29)
POC TEMPERATURE: 37 C
POTASSIUM BLD-SCNC: 3.8 MMOL/L (ref 3.5–5.1)
RBC # BLD AUTO: 3.78 M/UL (ref 4.6–6.2)
RELATIVE EOSINOPHIL (OHS): 3.2 %
RELATIVE LYMPHOCYTE (OHS): 6.3 % (ref 18–48)
RELATIVE MONOCYTE (OHS): 11.7 % (ref 4–15)
RELATIVE NEUTROPHIL (OHS): 77.6 % (ref 38–73)
SAMPLE: ABNORMAL
SODIUM BLD-SCNC: 139 MMOL/L (ref 136–145)
SPECIMEN SOURCE: NORMAL
WBC # BLD AUTO: 4.94 K/UL (ref 3.9–12.7)

## 2025-06-14 PROCEDURE — 93005 ELECTROCARDIOGRAM TRACING: CPT

## 2025-06-14 PROCEDURE — 84100 ASSAY OF PHOSPHORUS: CPT | Performed by: EMERGENCY MEDICINE

## 2025-06-14 PROCEDURE — 83605 ASSAY OF LACTIC ACID: CPT

## 2025-06-14 PROCEDURE — 99900035 HC TECH TIME PER 15 MIN (STAT)

## 2025-06-14 PROCEDURE — 83735 ASSAY OF MAGNESIUM: CPT | Performed by: EMERGENCY MEDICINE

## 2025-06-14 PROCEDURE — 80053 COMPREHEN METABOLIC PANEL: CPT | Performed by: EMERGENCY MEDICINE

## 2025-06-14 PROCEDURE — 99285 EMERGENCY DEPT VISIT HI MDM: CPT | Mod: 25

## 2025-06-14 PROCEDURE — 96375 TX/PRO/DX INJ NEW DRUG ADDON: CPT

## 2025-06-14 PROCEDURE — 87040 BLOOD CULTURE FOR BACTERIA: CPT | Performed by: EMERGENCY MEDICINE

## 2025-06-14 PROCEDURE — 93010 ELECTROCARDIOGRAM REPORT: CPT | Mod: ,,, | Performed by: INTERNAL MEDICINE

## 2025-06-14 PROCEDURE — 96374 THER/PROPH/DIAG INJ IV PUSH: CPT

## 2025-06-14 PROCEDURE — 85025 COMPLETE CBC W/AUTO DIFF WBC: CPT | Performed by: EMERGENCY MEDICINE

## 2025-06-14 PROCEDURE — 82803 BLOOD GASES ANY COMBINATION: CPT

## 2025-06-14 PROCEDURE — 80047 BASIC METABLC PNL IONIZED CA: CPT

## 2025-06-14 PROCEDURE — 86140 C-REACTIVE PROTEIN: CPT | Performed by: EMERGENCY MEDICINE

## 2025-06-14 RX ORDER — HYDROMORPHONE HYDROCHLORIDE 1 MG/ML
1 INJECTION, SOLUTION INTRAMUSCULAR; INTRAVENOUS; SUBCUTANEOUS
Refills: 0 | Status: COMPLETED | OUTPATIENT
Start: 2025-06-14 | End: 2025-06-15

## 2025-06-14 NOTE — Clinical Note
Diagnosis: Hypertension [569236]   Reason for IP Medical Treatment  (Clinical interventions that can only be accomplished in the IP setting? ) :: Blood pressure medication management   Reason for IP Medical Treatment  (Clinical interventions that can only be accomplished in the IP setting? ) :: also elevated LFTs   Special Needs:: No Special Needs [1]   In receipt of a referral from Dr. Marcianne Meckel of the [facility] for [facility] for achalasia type 2, for a consult for [facility] Myotomy. Pt's mother states Yahaira Schulte has a Methamphetamine problem and was just ordered from long term to a rehab facility  for 4-6 months, as part of his sentence. She is unclear whether the  will release the patient for an appointment with Dr. Bibi Flores, as well as the possible [facility] Myotomy. I scheduled the appointment for 5/2/19 to give her time to find out, and she can keep us posted accordingly. She states he has a relative with the same DX, and had the procedure as well. All notes from the referring physician, including his manometry and EGD were scanned in to media for review. Please advise if I need to do anything further at this time based on the results of his testing.

## 2025-06-15 ENCOUNTER — TELEPHONE (OUTPATIENT)
Dept: TRANSPLANT | Facility: CLINIC | Age: 30
End: 2025-06-15
Payer: MEDICARE

## 2025-06-15 VITALS
TEMPERATURE: 98 F | BODY MASS INDEX: 21.37 KG/M2 | WEIGHT: 157.75 LBS | SYSTOLIC BLOOD PRESSURE: 153 MMHG | HEIGHT: 72 IN | DIASTOLIC BLOOD PRESSURE: 121 MMHG | HEART RATE: 77 BPM | OXYGEN SATURATION: 99 % | RESPIRATION RATE: 18 BRPM

## 2025-06-15 PROBLEM — R74.01 TRANSAMINITIS: Status: ACTIVE | Noted: 2025-04-03

## 2025-06-15 PROBLEM — R03.0 ELEVATED BLOOD PRESSURE READING: Status: ACTIVE | Noted: 2025-06-15

## 2025-06-15 LAB
ABSOLUTE EOSINOPHIL (OHS): 0.26 K/UL
ABSOLUTE MONOCYTE (OHS): 0.68 K/UL (ref 0.3–1)
ABSOLUTE NEUTROPHIL COUNT (OHS): 3.95 K/UL (ref 1.8–7.7)
ALBUMIN SERPL BCP-MCNC: 3.2 G/DL (ref 3.5–5.2)
ALBUMIN SERPL BCP-MCNC: 3.3 G/DL (ref 3.5–5.2)
ALP SERPL-CCNC: 123 UNIT/L (ref 40–150)
ALP SERPL-CCNC: 123 UNIT/L (ref 40–150)
ALT SERPL W/O P-5'-P-CCNC: 267 UNIT/L (ref 10–44)
ALT SERPL W/O P-5'-P-CCNC: 287 UNIT/L (ref 10–44)
ANION GAP (OHS): 10 MMOL/L (ref 8–16)
ANION GAP (OHS): 10 MMOL/L (ref 8–16)
AST SERPL-CCNC: 156 UNIT/L (ref 11–45)
AST SERPL-CCNC: 176 UNIT/L (ref 11–45)
BACTERIA #/AREA URNS AUTO: ABNORMAL /HPF
BASOPHILS # BLD AUTO: 0.03 K/UL
BASOPHILS NFR BLD AUTO: 0.6 %
BILIRUB SERPL-MCNC: 0.3 MG/DL (ref 0.1–1)
BILIRUB SERPL-MCNC: 0.3 MG/DL (ref 0.1–1)
BILIRUB UR QL STRIP.AUTO: NEGATIVE
BUN SERPL-MCNC: 16 MG/DL (ref 6–20)
BUN SERPL-MCNC: 17 MG/DL (ref 6–20)
CALCIUM SERPL-MCNC: 9.5 MG/DL (ref 8.7–10.5)
CALCIUM SERPL-MCNC: 9.7 MG/DL (ref 8.7–10.5)
CHLORIDE SERPL-SCNC: 107 MMOL/L (ref 95–110)
CHLORIDE SERPL-SCNC: 107 MMOL/L (ref 95–110)
CLARITY UR: CLEAR
CO2 SERPL-SCNC: 23 MMOL/L (ref 23–29)
CO2 SERPL-SCNC: 24 MMOL/L (ref 23–29)
COLOR UR AUTO: YELLOW
CREAT SERPL-MCNC: 1.3 MG/DL (ref 0.5–1.4)
CREAT SERPL-MCNC: 1.4 MG/DL (ref 0.5–1.4)
CRP SERPL-MCNC: 14.3 MG/L
ERYTHROCYTE [DISTWIDTH] IN BLOOD BY AUTOMATED COUNT: 15.5 % (ref 11.5–14.5)
GFR SERPLBLD CREATININE-BSD FMLA CKD-EPI: >60 ML/MIN/1.73/M2
GFR SERPLBLD CREATININE-BSD FMLA CKD-EPI: >60 ML/MIN/1.73/M2
GLUCOSE SERPL-MCNC: 86 MG/DL (ref 70–110)
GLUCOSE SERPL-MCNC: 99 MG/DL (ref 70–110)
GLUCOSE UR QL STRIP: NEGATIVE
HAV IGM SERPL QL IA: NORMAL
HBV CORE IGM SERPL QL IA: NORMAL
HBV SURFACE AG SERPL QL IA: NORMAL
HCT VFR BLD AUTO: 35.6 % (ref 40–54)
HCV AB SERPL QL IA: NORMAL
HGB BLD-MCNC: 11.2 GM/DL (ref 14–18)
HGB UR QL STRIP: ABNORMAL
HOLD SPECIMEN: NORMAL
IMM GRANULOCYTES # BLD AUTO: 0.06 K/UL (ref 0–0.04)
IMM GRANULOCYTES NFR BLD AUTO: 1.1 % (ref 0–0.5)
KETONES UR QL STRIP: NEGATIVE
LEUKOCYTE ESTERASE UR QL STRIP: ABNORMAL
LYMPHOCYTES # BLD AUTO: 0.34 K/UL (ref 1–4.8)
MAGNESIUM SERPL-MCNC: 1.6 MG/DL (ref 1.6–2.6)
MAGNESIUM SERPL-MCNC: 1.7 MG/DL (ref 1.6–2.6)
MCH RBC QN AUTO: 27 PG (ref 27–31)
MCHC RBC AUTO-ENTMCNC: 31.5 G/DL (ref 32–36)
MCV RBC AUTO: 86 FL (ref 82–98)
MICROSCOPIC COMMENT: ABNORMAL
NITRITE UR QL STRIP: NEGATIVE
NUCLEATED RBC (/100WBC) (OHS): 0 /100 WBC
OHS QRS DURATION: 90 MS
OHS QTC CALCULATION: 430 MS
PH UR STRIP: 7 [PH]
PHOSPHATE SERPL-MCNC: 2 MG/DL (ref 2.7–4.5)
PLATELET # BLD AUTO: 280 K/UL (ref 150–450)
PMV BLD AUTO: 10.2 FL (ref 9.2–12.9)
POTASSIUM SERPL-SCNC: 3.9 MMOL/L (ref 3.5–5.1)
POTASSIUM SERPL-SCNC: 3.9 MMOL/L (ref 3.5–5.1)
PROT SERPL-MCNC: 6.6 GM/DL (ref 6–8.4)
PROT SERPL-MCNC: 6.7 GM/DL (ref 6–8.4)
PROT UR QL STRIP: ABNORMAL
RBC # BLD AUTO: 4.15 M/UL (ref 4.6–6.2)
RBC #/AREA URNS AUTO: >100 /HPF (ref 0–4)
RELATIVE EOSINOPHIL (OHS): 4.9 %
RELATIVE LYMPHOCYTE (OHS): 6.4 % (ref 18–48)
RELATIVE MONOCYTE (OHS): 12.8 % (ref 4–15)
RELATIVE NEUTROPHIL (OHS): 74.2 % (ref 38–73)
SARS-COV-2 RDRP RESP QL NAA+PROBE: NEGATIVE
SODIUM SERPL-SCNC: 140 MMOL/L (ref 136–145)
SODIUM SERPL-SCNC: 141 MMOL/L (ref 136–145)
SP GR UR STRIP: 1.02
SQUAMOUS #/AREA URNS AUTO: 1 /HPF
UROBILINOGEN UR STRIP-ACNC: ABNORMAL EU/DL
WBC # BLD AUTO: 5.32 K/UL (ref 3.9–12.7)
WBC #/AREA URNS AUTO: 3 /HPF (ref 0–5)

## 2025-06-15 PROCEDURE — 36415 COLL VENOUS BLD VENIPUNCTURE: CPT | Performed by: NURSE PRACTITIONER

## 2025-06-15 PROCEDURE — 63600175 PHARM REV CODE 636 W HCPCS

## 2025-06-15 PROCEDURE — U0002 COVID-19 LAB TEST NON-CDC: HCPCS | Performed by: EMERGENCY MEDICINE

## 2025-06-15 PROCEDURE — 63600175 PHARM REV CODE 636 W HCPCS: Performed by: EMERGENCY MEDICINE

## 2025-06-15 PROCEDURE — 85025 COMPLETE CBC W/AUTO DIFF WBC: CPT | Performed by: NURSE PRACTITIONER

## 2025-06-15 PROCEDURE — A4216 STERILE WATER/SALINE, 10 ML: HCPCS | Performed by: NURSE PRACTITIONER

## 2025-06-15 PROCEDURE — 83735 ASSAY OF MAGNESIUM: CPT | Performed by: NURSE PRACTITIONER

## 2025-06-15 PROCEDURE — G0378 HOSPITAL OBSERVATION PER HR: HCPCS

## 2025-06-15 PROCEDURE — 87040 BLOOD CULTURE FOR BACTERIA: CPT | Performed by: EMERGENCY MEDICINE

## 2025-06-15 PROCEDURE — 63600175 PHARM REV CODE 636 W HCPCS: Performed by: NURSE PRACTITIONER

## 2025-06-15 PROCEDURE — 80074 ACUTE HEPATITIS PANEL: CPT | Performed by: NURSE PRACTITIONER

## 2025-06-15 PROCEDURE — 80053 COMPREHEN METABOLIC PANEL: CPT | Performed by: NURSE PRACTITIONER

## 2025-06-15 PROCEDURE — 99238 HOSP IP/OBS DSCHRG MGMT 30/<: CPT | Mod: ,,, | Performed by: NURSE PRACTITIONER

## 2025-06-15 PROCEDURE — 25000003 PHARM REV CODE 250: Performed by: TRANSPLANT SURGERY

## 2025-06-15 PROCEDURE — 81001 URINALYSIS AUTO W/SCOPE: CPT | Performed by: EMERGENCY MEDICINE

## 2025-06-15 PROCEDURE — 25000003 PHARM REV CODE 250: Performed by: NURSE PRACTITIONER

## 2025-06-15 RX ORDER — OXYBUTYNIN CHLORIDE 5 MG/1
5 TABLET ORAL 3 TIMES DAILY PRN
Status: DISCONTINUED | OUTPATIENT
Start: 2025-06-15 | End: 2025-06-15 | Stop reason: HOSPADM

## 2025-06-15 RX ORDER — CARVEDILOL 12.5 MG/1
12.5 TABLET ORAL 2 TIMES DAILY
Status: DISCONTINUED | OUTPATIENT
Start: 2025-06-15 | End: 2025-06-15 | Stop reason: HOSPADM

## 2025-06-15 RX ORDER — HYDRALAZINE HYDROCHLORIDE 20 MG/ML
10 INJECTION INTRAMUSCULAR; INTRAVENOUS ONCE
Status: COMPLETED | OUTPATIENT
Start: 2025-06-15 | End: 2025-06-15

## 2025-06-15 RX ORDER — TACROLIMUS 5 MG/1
10 CAPSULE ORAL 2 TIMES DAILY
Status: DISCONTINUED | OUTPATIENT
Start: 2025-06-15 | End: 2025-06-15 | Stop reason: HOSPADM

## 2025-06-15 RX ORDER — VALGANCICLOVIR 450 MG/1
450 TABLET, FILM COATED ORAL DAILY
Status: DISCONTINUED | OUTPATIENT
Start: 2025-06-15 | End: 2025-06-15 | Stop reason: HOSPADM

## 2025-06-15 RX ORDER — CARVEDILOL 6.25 MG/1
6.25 TABLET ORAL ONCE
Status: COMPLETED | OUTPATIENT
Start: 2025-06-15 | End: 2025-06-15

## 2025-06-15 RX ORDER — NIFEDIPINE 30 MG/1
60 TABLET, EXTENDED RELEASE ORAL 2 TIMES DAILY
Status: DISCONTINUED | OUTPATIENT
Start: 2025-06-15 | End: 2025-06-15 | Stop reason: HOSPADM

## 2025-06-15 RX ORDER — CARVEDILOL 6.25 MG/1
12.5 TABLET ORAL 2 TIMES DAILY
Qty: 360 TABLET | Refills: 3 | Status: SHIPPED | OUTPATIENT
Start: 2025-06-15 | End: 2026-06-15

## 2025-06-15 RX ORDER — HYDRALAZINE HYDROCHLORIDE 10 MG/1
25 TABLET, FILM COATED ORAL 2 TIMES DAILY PRN
Qty: 150 TABLET | Refills: 11 | Status: SHIPPED | OUTPATIENT
Start: 2025-06-15 | End: 2025-06-15

## 2025-06-15 RX ORDER — PREDNISONE 20 MG/1
20 TABLET ORAL DAILY
Status: DISCONTINUED | OUTPATIENT
Start: 2025-06-15 | End: 2025-06-15 | Stop reason: HOSPADM

## 2025-06-15 RX ORDER — METHOCARBAMOL 500 MG/1
500 TABLET, FILM COATED ORAL 4 TIMES DAILY PRN
Status: DISCONTINUED | OUTPATIENT
Start: 2025-06-15 | End: 2025-06-15 | Stop reason: HOSPADM

## 2025-06-15 RX ORDER — SULFAMETHOXAZOLE AND TRIMETHOPRIM 400; 80 MG/1; MG/1
1 TABLET ORAL EVERY MORNING
Status: DISCONTINUED | OUTPATIENT
Start: 2025-06-15 | End: 2025-06-15

## 2025-06-15 RX ORDER — HYDRALAZINE HYDROCHLORIDE 25 MG/1
25 TABLET, FILM COATED ORAL 2 TIMES DAILY PRN
Qty: 60 TABLET | Refills: 0 | Status: SHIPPED | OUTPATIENT
Start: 2025-06-15 | End: 2025-07-15

## 2025-06-15 RX ORDER — HYDRALAZINE HYDROCHLORIDE 20 MG/ML
10 INJECTION INTRAMUSCULAR; INTRAVENOUS EVERY 4 HOURS PRN
Status: DISCONTINUED | OUTPATIENT
Start: 2025-06-15 | End: 2025-06-15 | Stop reason: HOSPADM

## 2025-06-15 RX ORDER — LABETALOL HYDROCHLORIDE 5 MG/ML
20 INJECTION, SOLUTION INTRAVENOUS
Status: COMPLETED | OUTPATIENT
Start: 2025-06-15 | End: 2025-06-15

## 2025-06-15 RX ORDER — OXYCODONE HYDROCHLORIDE 10 MG/1
10 TABLET ORAL EVERY 4 HOURS PRN
Status: DISCONTINUED | OUTPATIENT
Start: 2025-06-15 | End: 2025-06-15 | Stop reason: HOSPADM

## 2025-06-15 RX ORDER — ONDANSETRON 8 MG/1
8 TABLET, ORALLY DISINTEGRATING ORAL EVERY 8 HOURS PRN
Status: DISCONTINUED | OUTPATIENT
Start: 2025-06-15 | End: 2025-06-15 | Stop reason: HOSPADM

## 2025-06-15 RX ORDER — LABETALOL 100 MG/1
300 TABLET, FILM COATED ORAL 2 TIMES DAILY
Status: DISCONTINUED | OUTPATIENT
Start: 2025-06-15 | End: 2025-06-15

## 2025-06-15 RX ORDER — ACETAMINOPHEN 325 MG/1
650 TABLET ORAL EVERY 6 HOURS PRN
Status: DISCONTINUED | OUTPATIENT
Start: 2025-06-15 | End: 2025-06-15 | Stop reason: HOSPADM

## 2025-06-15 RX ORDER — FAMOTIDINE 20 MG/1
20 TABLET, FILM COATED ORAL NIGHTLY
Status: DISCONTINUED | OUTPATIENT
Start: 2025-06-15 | End: 2025-06-15 | Stop reason: HOSPADM

## 2025-06-15 RX ORDER — MYCOPHENOLATE MOFETIL 250 MG/1
1000 CAPSULE ORAL 2 TIMES DAILY
Status: DISCONTINUED | OUTPATIENT
Start: 2025-06-15 | End: 2025-06-15 | Stop reason: HOSPADM

## 2025-06-15 RX ORDER — CARVEDILOL 6.25 MG/1
6.25 TABLET ORAL 2 TIMES DAILY
Qty: 180 TABLET | Refills: 3 | Status: SHIPPED | OUTPATIENT
Start: 2025-06-15 | End: 2025-06-15

## 2025-06-15 RX ORDER — CARVEDILOL 6.25 MG/1
6.25 TABLET ORAL 2 TIMES DAILY
Status: DISCONTINUED | OUTPATIENT
Start: 2025-06-15 | End: 2025-06-15

## 2025-06-15 RX ORDER — SODIUM CHLORIDE 0.9 % (FLUSH) 0.9 %
3 SYRINGE (ML) INJECTION EVERY 8 HOURS
Status: DISCONTINUED | OUTPATIENT
Start: 2025-06-15 | End: 2025-06-15 | Stop reason: HOSPADM

## 2025-06-15 RX ORDER — GABAPENTIN 100 MG/1
100 CAPSULE ORAL 3 TIMES DAILY
Status: DISCONTINUED | OUTPATIENT
Start: 2025-06-15 | End: 2025-06-15 | Stop reason: HOSPADM

## 2025-06-15 RX ORDER — LABETALOL HYDROCHLORIDE 5 MG/ML
10 INJECTION, SOLUTION INTRAVENOUS
Status: DISCONTINUED | OUTPATIENT
Start: 2025-06-15 | End: 2025-06-15

## 2025-06-15 RX ADMIN — LABETALOL HYDROCHLORIDE 300 MG: 100 TABLET, FILM COATED ORAL at 07:06

## 2025-06-15 RX ADMIN — NIFEDIPINE 60 MG: 30 TABLET, FILM COATED, EXTENDED RELEASE ORAL at 07:06

## 2025-06-15 RX ADMIN — OXYCODONE HYDROCHLORIDE 10 MG: 10 TABLET ORAL at 04:06

## 2025-06-15 RX ADMIN — SULFAMETHOXAZOLE AND TRIMETHOPRIM 1 TABLET: 400; 80 TABLET ORAL at 06:06

## 2025-06-15 RX ADMIN — HYDROMORPHONE HYDROCHLORIDE 1 MG: 1 INJECTION, SOLUTION INTRAMUSCULAR; INTRAVENOUS; SUBCUTANEOUS at 12:06

## 2025-06-15 RX ADMIN — CARVEDILOL 6.25 MG: 6.25 TABLET, FILM COATED ORAL at 10:06

## 2025-06-15 RX ADMIN — FAMOTIDINE 20 MG: 20 TABLET, FILM COATED ORAL at 03:06

## 2025-06-15 RX ADMIN — GABAPENTIN 100 MG: 100 CAPSULE ORAL at 07:06

## 2025-06-15 RX ADMIN — CARVEDILOL 6.25 MG: 6.25 TABLET, FILM COATED ORAL at 12:06

## 2025-06-15 RX ADMIN — METHOCARBAMOL 500 MG: 500 TABLET ORAL at 03:06

## 2025-06-15 RX ADMIN — LABETALOL HYDROCHLORIDE 20 MG: 5 INJECTION INTRAVENOUS at 12:06

## 2025-06-15 RX ADMIN — MYCOPHENOLATE MOFETIL 1000 MG: 250 CAPSULE ORAL at 07:06

## 2025-06-15 RX ADMIN — HYDRALAZINE HYDROCHLORIDE 10 MG: 20 INJECTION, SOLUTION INTRAMUSCULAR; INTRAVENOUS at 01:06

## 2025-06-15 RX ADMIN — PREDNISONE 20 MG: 20 TABLET ORAL at 07:06

## 2025-06-15 RX ADMIN — Medication 3 ML: at 06:06

## 2025-06-15 RX ADMIN — TACROLIMUS 10 MG: 5 CAPSULE ORAL at 07:06

## 2025-06-15 RX ADMIN — VALGANCICLOVIR 450 MG: 450 TABLET, FILM COATED ORAL at 07:06

## 2025-06-15 NOTE — H&P
[facility] - Emergency Dept  Kidney Transplant  H&P      Subjective:     Chief Complaint/Reason for Admission: HTN, Elevated LTs     History of Present Illness:  s/p DBD Kidney transplant for ESRD 2/2 HTN on 6/9/25. Surgery w/o issue. Intra op US satisfactory. PD access removed. Incisions closed w derm a bond. 2 day lobato and one KARTHIK placed (removed 6/11). Patient progressed well.  Cr 3.1 on discharge.  Patient presents to the ER with elevated BP 3200/100 reports has taken medication as directed B/P elevated for two days per patient also reports pain not well controlled at home - over Inc site.  Cr in ER 1.4 he reports voiding well, noted on labs to have elevated LTF's patient denies taking other medication - use of Tylenol but limits use.  Patient with ZAVALETA - will obtain Liver US. Patient also reports right leg numbness/tinging since KTX.  CT in ED without significant findings Patient discussed will admit for BP management and assessment of LFT's including Liver US.      Review of patient's allergies indicates:  No Known Allergies    Past Medical History:   Diagnosis Date    Anemia     Disorder of kidney and ureter     Enteritis     Hypertension     LVH (left ventricular hypertrophy)     ZAVALETA (nonalcoholic steatohepatitis)     Prostatitis     Secondary hyperparathyroidism      Past Surgical History:   Procedure Laterality Date    INSERTION OF DIALYSIS CATHETER      KIDNEY TRANSPLANT N/A 6/9/2025    Procedure: TRANSPLANT, KIDNEY;  Surgeon: Grayson Mcfadden Jr., MD;  Location: [address];  Service: Transplant;  Laterality: N/A;    PERITONEAL CATHETER INSERTION      PERITONEAL CATHETER REMOVAL N/A 6/9/2025    Procedure: REMOVAL, CATHETER, DIALYSIS, PERITONEAL;  Surgeon: Grayson Mcfadden Jr., MD;  Location: [facility] OR [address] [place] [address];  Service: Transplant;  Laterality: N/A;     Family History       Problem Relation (Age of Onset)    Heart disease Sister    Hypertension Mother, Maternal Grandmother          Tobacco Use     "Smoking status: Never    Smokeless tobacco: Never   Substance and Sexual Activity    Alcohol use: Not Currently    Drug use: Never    Sexual activity: Not Currently        Review of Systems   Constitutional:  Negative for activity change and appetite change.   Eyes:  Negative for visual disturbance.   Respiratory:  Negative for cough and shortness of breath.    Cardiovascular:  Negative for chest pain, palpitations and leg swelling.   Gastrointestinal:  Positive for abdominal pain. Negative for abdominal distention, constipation, diarrhea, nausea and vomiting.   Genitourinary:  Negative for difficulty urinating.   Musculoskeletal:  Negative for arthralgias.   Skin:  Negative for wound.   Allergic/Immunologic: Positive for immunocompromised state.   Neurological:  Negative for dizziness.   Hematological:  Negative for adenopathy. Does not bruise/bleed easily.   Psychiatric/Behavioral:  Negative for agitation.      Objective:     Vital Signs (Most Recent):  Temp: 98.1 °F (36.7 °C) (06/14/25 2242)  Pulse: 74 (06/15/25 0015)  Resp: (!) 24 (06/15/25 0015)  BP: (!) 201/135 (06/15/25 0015)  SpO2: 98 % (06/15/25 0015)  Height: 6' 1" (185.4 cm)  Weight: 76.2 kg (168 lb)  Body mass index is 22.16 kg/m².      Physical Exam  Vitals and nursing note reviewed.   Constitutional:       Appearance: He is well-developed.   HENT:      Head: Normocephalic.   Eyes:      Conjunctiva/sclera: Conjunctivae normal.   Cardiovascular:      Rate and Rhythm: Normal rate and regular rhythm.      Heart sounds: No murmur heard.  Pulmonary:      Effort: Pulmonary effort is normal.      Breath sounds: Normal breath sounds.   Abdominal:      General: Bowel sounds are normal. There is no distension.      Palpations: Abdomen is soft.      Tenderness: There is abdominal tenderness.          Comments: Inc with dermabond intact there in no drainage mild redness noted to the upper portion on the inc    Musculoskeletal:         General: Normal range of " motion.      Cervical back: Normal range of motion.   Skin:     General: Skin is warm and dry.      Capillary Refill: Capillary refill takes less than 2 seconds.   Neurological:      Mental Status: He is alert and oriented to person, place, and time.   Psychiatric:         Behavior: Behavior normal.         Thought Content: Thought content normal.         Judgment: Judgment normal.          Laboratory  CBC:   Recent Labs   Lab 06/11/25  0612 06/12/25  0845 06/14/25  2323 06/14/25  2335   WBC 5.39 4.46 4.94  --    RBC 3.59* 3.85* 3.78*  --    HGB 9.9* 10.8* 10.7*  --    HCT 31.9* 34.1* 32.4* 34*    224 252  --    MCV 89 89 86  --    MCH 27.6 28.1 28.3  --    MCHC 31.0* 31.7* 33.0  --      BMP:   Recent Labs   Lab 06/11/25  0612 06/12/25  0845 06/14/25  2323   GLU 89 85 99    139 141   K 3.4* 3.8 3.9   * 107 107   CO2 21* 26 24   BUN 34* 18 17   CREATININE 3.1* 1.8* 1.4   CALCIUM 8.7 9.2 9.5     CMP:   Recent Labs   Lab 06/09/25  0536 06/09/25  1403 06/11/25  0612 06/12/25  0845 06/14/25  2323   GLU 84   < > 89 85 99   CALCIUM 9.3   < > 8.7 9.2 9.5   ALBUMIN 3.7   < > 2.7* 3.0* 3.3*   PROT 6.9  --   --   --  6.6      < > 141 139 141   K 4.2   < > 3.4* 3.8 3.9   CO2 21*   < > 21* 26 24      < > 112* 107 107   BUN 93*   < > 34* 18 17   CREATININE 19.4*   < > 3.1* 1.8* 1.4   ALKPHOS 87  --   --   --  123   ALT 38  --   --   --  267*   AST 34  --   --   --  176*    < > = values in this interval not displayed.     Labs within the past 24 hours have been reviewed.    Diagnostic Results:  CT - Abd/Pelvic: No results found for this or any previous visit.      Assessment/Plan:     Cardiac/Vascular  Essential hypertension  - home B/P meds added back previous Labetalol on this admission       Renal/  Status post kidney transplant  s/p DBD Kidney transplant for ESRD 2/2 HTN on 6/9/25. Surgery w/o issue. Intra op US satisfactory. PD access removed. Incisions closed w derm a bond       ID  At risk  for opportunistic infections  - Bactrim for PCP ppx.  - Valcyte for CMV ppx.      Immunology/Multi System  Need for prophylactic immunotherapy  - continue prograf  - continue to monitor for toxic side effects and check daily levels. Will adjust for therapeutic dose      Oncology  Long-term current use of immunomodulator  - see prophylactic immunotherapy         Anemia of chronic disease  - H/H stable  - no overt signs of bleed  - continue to monitor with daily CBC      GI  Elevated liver enzymes  - Liver US       Hepatic steatosis        Other  Pain, unspecified  Inc site             Discharge Planning:  Not candidate for discharge at this time    Medical decision making for this encounter includes review of pertinent labs and diagnostic studies, assessment and planning, discussions with consulting providers, discussion with patient/family, and participation in multidisciplinary rounds. Time spent caring for patient: 60 minutes    BRITNEY Langley  Kidney Transplant  [facility] - Emergency Dept

## 2025-06-15 NOTE — HOSPITAL COURSE
Patient will be discharged to back to [facility], added coreg 12.5 with hold parameters and PRN Hydralazine- conitnues on nifedipine.  B/P slightly elevated patient educated to take b/p and write BP in transplant book.  Transaminase to be followed out patient repeat labs similar to 6/14.  Liver US without acute findings - Hep panel non-reactive. Discharge instructions reviewed by pharmacist, nursing.  Patient verbalized understanding and in agreement with discharge from hospital today.  Patient is medically stable for discharge. Patient will f/u with labs per transplant coordinator.

## 2025-06-15 NOTE — ED NOTES
Bed: 11  Expected date:   Expected time:   Means of arrival:   Comments:  Tiffanie  
I-STAT Chem-8+ Results:   Value Reference Range   Sodium 139 136-145 mmol/L   Potassium  3.8 3.5-5.1 mmol/L   Chloride 104  mmol/L   Ionized Calcium 1.29 1.06-1.42 mmol/L   CO2 (measured) 25 23-29 mmol/L   Glucose 100  mg/dL   BUN 18 6-30 mg/dL   Creatinine 1.4 0.5-1.4 mg/dL   Hematocrit 34 36-54%      
Telemetry Verification   Patient placed on Telemetry Box  Verified with War Room  Box # [id number]   Monitor Tech    Rate 84   Rhythm NSR      
clear

## 2025-06-15 NOTE — DISCHARGE SUMMARY
[facility] - Transplant Stepdown  Liver Transplant  Discharge Summary      Patient Name: Jeffrey Moreno  MRN: [id number]  Admission Date: 6/14/2025  Hospital Length of Stay: 0 days  Discharge Date and Time: 06/15/2025 2:17 PM  Attending Physician: Perico Downs MD   Discharging Provider: BRITNEY Langley  Primary Care Provider: Ellyn, Primary Doctor  HPI:   No notes on file    * No surgery found *     Hospital Course:    Patient will be discharged to back to [facility], added coreg 12.5 with hold parameters and PRN Hydralazine- conitnues on nifedipine.  B/P slightly elevated patient educated to take b/p and write BP in transplant book.  Cr improving daily and 1.3 at discharge.  Transaminase to be followed out patient repeat labs similar to 6/14.  Liver US without acute findings - Hep panel non-reactive. Discharge instructions reviewed by pharmacist, nursing.  Patient verbalized understanding and in agreement with discharge from hospital today.  Patient is medically stable for discharge. Patient will f/u with labs per transplant coordinator.      Goals of Care Treatment Preferences:  Code Status: Full Code      Final Active Diagnoses:    Diagnosis Date Noted POA    Elevated blood pressure reading [R03.0] 06/15/2025 Unknown    At risk for opportunistic infections [Z91.89] 06/10/2025 Yes    Long-term current use of immunomodulator [Z79.61] 06/10/2025 Not Applicable    Need for prophylactic immunotherapy [Z29.89] 06/10/2025 Not Applicable    Transplanted kidney [Z94.0] 06/10/2025 Not Applicable    Hepatic steatosis [K76.0] 04/03/2025 Yes    Transaminitis [R74.01] 04/03/2025 Yes    Anemia of chronic disease [D63.8] 06/14/2024 Yes    Elevated liver enzymes [R74.8] 06/14/2024 Yes    Pain, unspecified [R52] 06/14/2024 Yes    ZAVALETA (nonalcoholic steatohepatitis) [K75.81] 05/07/2024 Yes     Chronic    Essential hypertension [I10] 12/31/2023 Yes     Chronic      Problems Resolved During this Admission:           Pending  Diagnostic Studies:       None          Significant Diagnostic Studies: Labs: BMP:   Recent Labs   Lab 06/14/25 2323 06/15/25  0522   GLU 99 86    140   K 3.9 3.9    107   CO2 24 23   BUN 17 16   CREATININE 1.4 1.3   CALCIUM 9.5 9.7   MG 1.7 1.6   , CMP   Recent Labs   Lab 06/14/25 2323 06/15/25  0522    140   K 3.9 3.9    107   CO2 24 23   GLU 99 86   BUN 17 16   CREATININE 1.4 1.3   CALCIUM 9.5 9.7   PROT 6.6 6.7   ALBUMIN 3.3* 3.2*   BILITOT 0.3 0.3   ALKPHOS 123 123   * 156*   * 287*   ANIONGAP 10 10   , CBC   Recent Labs   Lab 06/14/25  2323 06/14/25  2335 06/15/25  0522   WBC 4.94  --  5.32   HGB 10.7*  --  11.2*   HCT 32.4*   < > 35.6*     --  280    < > = values in this interval not displayed.   , and All labs within the past 24 hours have been reviewed    The patients clinical status was discussed at multidisplinary rounds, involving transplant surgery, transplant medicine, pharmacy, nursing, nutrition, and social work    Discharged Condition: stable    Disposition:     Follow Up:    Patient Instructions:   No discharge procedures on file.  Medications:  Reconciled Home Medications:      Medication List        START taking these medications      carvediloL 6.25 MG tablet  Commonly known as: COREG  Take 2 tablets (12.5 mg total) by mouth 2 (two) times daily. Hold for SBP <120     hydrALAZINE 25 MG tablet  Commonly known as: APRESOLINE  Take 1 tablet (25 mg total) by mouth 2 (two) times daily as needed (BP > 180/90 and call coordinator).            CONTINUE taking these medications      cholecalciferol (vitamin D3) 50 mcg (2,000 unit) Tab  Commonly known as: VITAMIN D3  Take 1 tablet (2,000 Units total) by mouth once daily.     famotidine 20 MG tablet  Commonly known as: PEPCID  Take 1 tablet (20 mg total) by mouth every evening. Stop 7/11/25     gabapentin 100 MG capsule  Commonly known as: NEURONTIN  Take 1 capsule (100 mg total) by mouth 3 (three) times  daily.     k phos di & mono-sod phos mono 250 mg Tab  Commonly known as: K-Phos-NeutraL  Take 2 tablets by mouth every 12 (twelve) hours.     methocarbamoL 500 MG Tab  Commonly known as: Robaxin  Take 1 tablet (500 mg total) by mouth 4 (four) times daily as needed (muscle spasms).     multivitamin Tab  Take 1 tablet by mouth once daily.     mycophenolate 250 mg Cap  Commonly known as: CELLCEPT  Take 4 capsules (1,000 mg total) by mouth 2 (two) times daily.     NIFEdipine 60 MG (OSM) 24 hr tablet  Commonly known as: PROCARDIA-XL  Take 1 tablet (60 mg total) by mouth 2 (two) times a day.     oxybutynin 5 MG Tab  Commonly known as: DITROPAN  Take 1 tablet (5 mg total) by mouth 3 (three) times daily as needed (Bladder spasms).     oxyCODONE 10 mg Tab immediate release tablet  Commonly known as: ROXICODONE  Take 1 tablet (10 mg total) by mouth every 4 (four) hours as needed for Pain.     predniSONE 5 MG tablet  Commonly known as: DELTASONE  Take 20mg by mouth once daily from 6/11/25-6/17;  Take 15mg daily from 6/18-6/24;  Take 10mg daily from  6/25-7/1/25;  Take 5mg daily thereafter starting 7/2/25.     sulfamethoxazole-trimethoprim 400-80mg 400-80 mg per tablet  Commonly known as: BACTRIM,SEPTRA  Take 1 tablet by mouth every morning. Stop 12/6/25     tacrolimus 1 MG Cap  Commonly known as: PROGRAF  Take 10 capsules (10 mg total) by mouth every 12 (twelve) hours. Z94.0;Kidney txp on 6/9/25     valGANciclovir 450 mg Tab  Commonly known as: VALCYTE  Take 1 tablet (450 mg total) by mouth once daily. Stop 9/7/25            STOP taking these medications      labetaloL 300 MG tablet  Commonly known as: NORMODYNE            Time spent caring for patient (Greater than 1/2 spent in direct face-to-face contact): > 30 minutes    BRITNEY Langley  Liver Transplant  [facility] - Transplant Stepdown

## 2025-06-15 NOTE — HPI
s/p DBD Kidney transplant for ESRD 2/2 HTN on 6/9/25. Surgery w/o issue. Intra op US satisfactory. PD access removed. Incisions closed w derm a bond. 2 day lobato and one KARTHIK placed (removed 6/11). Patient progressed well.  Cr 3.1 on discharge.  Patient presents to the ER with elevated BP 3200/100 reports has taken medication as directed B/P elevated for two days per patient also reports pain not well controlled at home - over Inc site.  Cr in ER 1.4 he reports voiding well, noted on labs to have elevated LTF's patient denies taking other medication - use of Tylenol but limits use.  Patient with ZAVALETA - will obtain Liver US. Patient discussed will admit for BP management and assessment of LFTs.

## 2025-06-15 NOTE — PLAN OF CARE
AAOx4,  patient aware of how to take medications FNP explained to patient.  Incision care reviewed with mom and patient. AVS given to patient and reviewed. Patient acknowledges follow up appointments and signs and symptoms of what  to look out for as complications and when to follow up with Transplant coordinator, PCP or 24 hr nurse assistance phoneline. PIV removed with catheter intact. Telemetry removed. Patient to  new medications downstairs from pharmacy. Patient left ambulating with mom. Patient denies and new or additional complaints.

## 2025-06-15 NOTE — ASSESSMENT & PLAN NOTE
s/p DBD Kidney transplant for ESRD 2/2 HTN on 6/9/25. Surgery w/o issue. Intra op US satisfactory. PD access removed. Incisions closed w derm a bond

## 2025-06-15 NOTE — ED PROVIDER NOTES
Encounter Date: 6/14/2025       History     Chief Complaint   Patient presents with    Post-op Problem     Had recent kidney transplant. States that he is still in pain and his right leg is numb/weak since the procedure on 6/9.     Hypertension     Mr. Moreno is a 29 y.o. year old male with ZAVALETA (saw hepatology 4/3, see note) and ESRD secondary to presumed HTN now status post kidney transplant on 6/9/25 that presented with abdominal pain and leg pain.  Patient states that he has had leg pain since the operation however today started to have significant abdominal pain.  Patient states that he can not sleep, he has nausea, lack of appetite.  Denies fevers or chills however does state that his abdomen feels warm.        Review of patient's allergies indicates:  No Known Allergies  Past Medical History:   Diagnosis Date    Anemia     Disorder of kidney and ureter     Enteritis     Hypertension     LVH (left ventricular hypertrophy)     ZAVALETA (nonalcoholic steatohepatitis)     Prostatitis     Secondary hyperparathyroidism      Past Surgical History:   Procedure Laterality Date    INSERTION OF DIALYSIS CATHETER      KIDNEY TRANSPLANT N/A 6/9/2025    Procedure: TRANSPLANT, KIDNEY;  Surgeon: Grayson Mcfadden Jr., MD;  Location: [facility] OR [address] [place] [address];  Service: Transplant;  Laterality: N/A;    PERITONEAL CATHETER INSERTION      PERITONEAL CATHETER REMOVAL N/A 6/9/2025    Procedure: REMOVAL, CATHETER, DIALYSIS, PERITONEAL;  Surgeon: Grayson Mcfadden Jr., MD;  Location: [facility] OR [address] [place] [address];  Service: Transplant;  Laterality: N/A;     Family History   Problem Relation Name Age of Onset    Hypertension Mother      Heart disease Sister      Hypertension Maternal Grandmother       Social History[1]  Review of Systems  ROS negative except as noted in HPI     Physical Exam     Initial Vitals [06/14/25 2242]   BP Pulse Resp Temp SpO2   (!) 173/122 86 16 98.1 °F (36.7 °C) 99 %      MAP       --         Physical Exam    Nursing note and  vitals reviewed.  Constitutional: He appears distressed (secondary to pain).   HENT:   Head: Normocephalic and atraumatic. Mouth/Throat: Oropharynx is clear and moist.   Eyes: Conjunctivae are normal.   Cardiovascular:  Normal rate and regular rhythm.           Pulmonary/Chest: Breath sounds normal.   Abdominal: He exhibits distension. There is abdominal tenderness. There is guarding. There is no rebound.   Musculoskeletal:         General: No edema.     Neurological: He is alert and oriented to person, place, and time. He has normal strength. No cranial nerve deficit or sensory deficit.   Skin: Skin is warm. Capillary refill takes less than 2 seconds.   Psychiatric: He has a normal mood and affect.         ED Course   Procedures  Labs Reviewed   COMPREHENSIVE METABOLIC PANEL - Abnormal       Result Value    Sodium 141      Potassium 3.9      Chloride 107      CO2 24      Glucose 99      BUN 17      Creatinine 1.4      Calcium 9.5      Protein Total 6.6      Albumin 3.3 (*)     Bilirubin Total 0.3             (*)      (*)     Anion Gap 10      eGFR >60     URINALYSIS, REFLEX TO URINE CULTURE - Abnormal    Color, UA Yellow      Appearance, UA Clear      pH, UA 7.0      Spec Grav UA 1.020      Protein, UA Trace (*)     Glucose, UA Negative      Ketones, UA Negative      Bilirubin, UA Negative      Blood, UA 3+ (*)     Nitrites, UA Negative      Urobilinogen, UA 2.0-3.0 (*)     Leukocyte Esterase, UA Trace (*)    CBC WITH DIFFERENTIAL - Abnormal    WBC 4.94      RBC 3.78 (*)     HGB 10.7 (*)     HCT 32.4 (*)     MCV 86      MCH 28.3      MCHC 33.0      RDW 15.5 (*)     Platelet Count 252      MPV 10.2      Nucleated RBC 0      Neut % 77.6 (*)     Lymph % 6.3 (*)     Mono % 11.7      Eos % 3.2      Basophil % 0.6      Imm Grans % 0.6 (*)     Neut # 3.83      Lymph # 0.31 (*)     Mono # 0.58      Eos # 0.16      Baso # 0.03      Imm Grans # 0.03     C-REACTIVE PROTEIN - Abnormal    CRP 14.3 (*)     PHOSPHORUS - Abnormal    Phosphorus Level 2.0 (*)    URINALYSIS MICROSCOPIC - Abnormal    RBC, UA >100 (*)     WBC, UA 3      Bacteria, UA Rare      Squamous Epithelial Cells, UA 1      Microscopic Comment       ISTAT PROCEDURE - Abnormal    POC Glucose 100      POC BUN 18      POC Creatinine 1.4      POC Sodium 139      POC Potassium 3.8      POC Chloride 104      POC TCO2 (MEASURED) 25      POC Ionized Calcium 1.29      POC Hematocrit 34 (*)     Sample AIDA     MAGNESIUM - Normal    Magnesium  1.7     SARS-COV-2 RNA AMPLIFICATION, QUAL - Normal    SARS COV-2 Molecular Negative     CULTURE, BLOOD   CULTURE, BLOOD   CBC W/ AUTO DIFFERENTIAL    Narrative:     The following orders were created for panel order CBC auto differential.  Procedure                               Abnormality         Status                     ---------                               -----------         ------                     CBC with Differential[[id number]]       Abnormal            Final result                 Please view results for these tests on the individual orders.   GREY TOP URINE HOLD    Extra Tube Hold for add-ons.     ISTAT CHEM8          Imaging Results              US Transplant Kidney With Doppler (In process)                      CT Abdomen Pelvis  Without Contrast (Final result)  Result time 06/14/25 23:53:28      Final result by Jeffrey Vaughan MD (06/14/25 23:53:28)                   Impression:      There is cardiomegaly, small volume ascites, and a right pelvic renal allograft with a double pigtail ureteral stent.  No additional evidence of acute process involving the abdomen or pelvis.      Electronically signed by: Jeffrey Vaughan  Date:    06/14/2025  Time:    23:53               Narrative:    EXAMINATION:  CT ABDOMEN PELVIS WITHOUT CONTRAST    CLINICAL HISTORY:  Abdominal pain, post-op;    TECHNIQUE:  Low dose axial images, sagittal and coronal reformations were obtained from the lung bases to the pubic  symphysis without the use of intravenous contrast.    COMPARISON:  None available.    FINDINGS:  Lung bases: No worrisome pulmonary nodules or focal consolidations are identified.  Atherosclerotic calcification is present involving the coronary arteries.    Liver: No focal mass.    Gallbladder: No calcified gallstones.    Bile Ducts: No evidence of intra or extra hepatic biliary ductal dilation.    Spleen: Unremarkable.    Kidneys: No renal mass, calcification, or hydronephrosis.  A right pelvic renal allograft is present with a double pigtail ureteral catheter present    Adrenals: Unremarkable.    Pancreas: No mass or peripancreatic fat stranding.    Bowel: No evidence of bowel obstruction or inflammation.    Lymph nodes: No evidence of lymphadenopathy involving the abdomen or pelvis.    Vascular: The abdominal aorta is normal in diameter.    Pelvic organs: No evidence of pelvic mass.    Urinary Bladder: Unremarkable.    Bones:  No evidence of acute osseous process.    Abdominal wall:  Radiographs of the chest from 06/12/2025.  Small amount of subcutaneous emphysema is present involving the anterior abdominal wall, likely postoperative in nature given reported history of recent surgery.    Other: Small volume ascites is present.  A trace amount of intra-abdominal free air is also present, also likely postoperative in nature.                                       Medications   HYDROmorphone injection 1 mg (1 mg Intravenous Given 6/15/25 0003)   labetaloL injection 20 mg (20 mg Intravenous Given 6/15/25 0021)   hydrALAZINE injection 10 mg (10 mg Intravenous Given 6/15/25 0150)     Medical Decision Making  Mr. Moreno is a 29 y.o. year old male with ZAVALETA (saw hepatology 4/3, see note) and ESRD secondary to presumed HTN now status post kidney transplant on 6/9/25 that presented with abdominal pain and leg pain.    On initial evaluation patient is hypertensive, otherwise his vitals are within normal limits.  Patient is  alert and oriented and speaking in full sentences.    Differential for patient's presentation includes intra-abdominal infection, acute transplant rejection, UTI.    Discussed with on-call transplant medicine after results returning.  Reassured by patient's labs and CT scan.  Discussed with them about his elevations in LFTs and they will follow up and continue to monitor.  Transplant medicine to admit for pain control and trending lab abnormalities.    Amount and/or Complexity of Data Reviewed  Labs: ordered. Decision-making details documented in ED Course.  Radiology: ordered.    Risk  Prescription drug management.  Decision regarding hospitalization.              Attending Attestation:   Physician Attestation Statement for Resident:  As the supervising MD   Physician Attestation Statement: I have personally seen and examined this patient.   I agree with the above history.  -:   As the supervising MD I agree with the above PE.     As the supervising MD I agree with the above treatment, course, plan, and disposition.    I have reviewed and agree with the residents interpretation of the following: lab data and CT scans.  I have reviewed the following: old records at this facility.                ED Course as of 06/15/25 0245   Sat Jun 14, 2025   2330 POC Lactate: 1.1  Reassuring [TK]   Sun Conrado 15, 2025   0135 RBC, UA(!): >100 [TK]   0135 Leukocyte Esterase, UA(!): Trace [TK]   0135 Blood, UA(!): 3+ [TK]   0135 CRP(!): 14.3  Mildly elevated [TK]   0135 Comprehensive metabolic panel(!)  AST and ALT significantly elevated, not previously elevated.  Notified transplant Medicine and they will continue to follow and workup as they deem appropriate [TK]   0136 Phosphorus Level(!): 2.0  Mildly decreased [TK]      ED Course User Index  [TK] Nini Benitez DO                           Clinical Impression:  Final diagnoses:  [R53.1] Weakness  [R03.0] Elevated blood pressure reading  [Z94.0] Transplanted kidney  (Primary)  [G89.18] Postoperative pain  [R74.01] Transaminitis  [I10] Hypertension          ED Disposition Condition    Observation                     Nini Benitez, DO  Resident  06/15/25 1215         [1]   Social History  Tobacco Use    Smoking status: Never    Smokeless tobacco: Never   Substance Use Topics    Alcohol use: Not Currently    Drug use: Never        Teetee Escalera MD  06/15/25 8685

## 2025-06-15 NOTE — ED TRIAGE NOTES
Jeffrey Moreno, a 29 y.o. male presents to the ED w/ complaint of right leg numbness and weakness since Monday following right kidney transplant. Pt endorsing 8/10 burning pain in his right foot. Pt hypertensive in the ED.    Triage note:  Chief Complaint   Patient presents with    Post-op Problem     Had recent kidney transplant. States that he is still in pain and his right leg is numb/weak since the procedure on 6/9.     Hypertension     Review of patient's allergies indicates:  No Known Allergies  Past Medical History:   Diagnosis Date    Anemia     Disorder of kidney and ureter     Enteritis     Hypertension     LVH (left ventricular hypertrophy)     ZAVALETA (nonalcoholic steatohepatitis)     Prostatitis     Secondary hyperparathyroidism

## 2025-06-15 NOTE — SUBJECTIVE & OBJECTIVE
Subjective:     Chief Complaint/Reason for Admission: HTN, Elevated LTs     History of Present Illness:  s/p DBD Kidney transplant for ESRD 2/2 HTN on 6/9/25. Surgery w/o issue. Intra op US satisfactory. PD access removed. Incisions closed w derm a bond. 2 day lobato and one KARTHIK placed (removed 6/11). Patient progressed well.  Cr 3.1 on discharge.  Patient presents to the ER with elevated BP 3200/100 reports has taken medication as directed B/P elevated for two days per patient also reports pain not well controlled at home - over Inc site.  Cr in ER 1.4 he reports voiding well, noted on labs to have elevated LTF's patient denies taking other medication - use of Tylenol but limits use.  Patient with ZAVALETA - will obtain Liver US. Patient discussed will admit for BP management and assessment of LFTs.       Review of patient's allergies indicates:  No Known Allergies    Past Medical History:   Diagnosis Date    Anemia     Disorder of kidney and ureter     Enteritis     Hypertension     LVH (left ventricular hypertrophy)     ZAVALETA (nonalcoholic steatohepatitis)     Prostatitis     Secondary hyperparathyroidism      Past Surgical History:   Procedure Laterality Date    INSERTION OF DIALYSIS CATHETER      KIDNEY TRANSPLANT N/A 6/9/2025    Procedure: TRANSPLANT, KIDNEY;  Surgeon: Grayson Mcfadden Jr., MD;  Location: [address];  Service: Transplant;  Laterality: N/A;    PERITONEAL CATHETER INSERTION      PERITONEAL CATHETER REMOVAL N/A 6/9/2025    Procedure: REMOVAL, CATHETER, DIALYSIS, PERITONEAL;  Surgeon: Grayson Mcfadden Jr., MD;  Location: [facility] OR [address] [place] [address];  Service: Transplant;  Laterality: N/A;     Family History       Problem Relation (Age of Onset)    Heart disease Sister    Hypertension Mother, Maternal Grandmother          Tobacco Use    Smoking status: Never    Smokeless tobacco: Never   Substance and Sexual Activity    Alcohol use: Not Currently    Drug use: Never    Sexual activity: Not Currently     "    Review of Systems   Constitutional:  Negative for activity change and appetite change.   Eyes:  Negative for visual disturbance.   Respiratory:  Negative for cough and shortness of breath.    Cardiovascular:  Negative for chest pain, palpitations and leg swelling.   Gastrointestinal:  Positive for abdominal pain. Negative for abdominal distention, constipation, diarrhea, nausea and vomiting.   Genitourinary:  Negative for difficulty urinating.   Musculoskeletal:  Negative for arthralgias.   Skin:  Negative for wound.   Allergic/Immunologic: Positive for immunocompromised state.   Neurological:  Negative for dizziness.   Hematological:  Negative for adenopathy. Does not bruise/bleed easily.   Psychiatric/Behavioral:  Negative for agitation.      Objective:     Vital Signs (Most Recent):  Temp: 98.1 °F (36.7 °C) (06/14/25 2242)  Pulse: 74 (06/15/25 0015)  Resp: (!) 24 (06/15/25 0015)  BP: (!) 201/135 (06/15/25 0015)  SpO2: 98 % (06/15/25 0015)  Height: 6' 1" (185.4 cm)  Weight: 76.2 kg (168 lb)  Body mass index is 22.16 kg/m².      Physical Exam  Vitals and nursing note reviewed.   Constitutional:       Appearance: He is well-developed.   HENT:      Head: Normocephalic.   Eyes:      Conjunctiva/sclera: Conjunctivae normal.   Cardiovascular:      Rate and Rhythm: Normal rate and regular rhythm.      Heart sounds: No murmur heard.  Pulmonary:      Effort: Pulmonary effort is normal.      Breath sounds: Normal breath sounds.   Abdominal:      General: Bowel sounds are normal. There is no distension.      Palpations: Abdomen is soft.      Tenderness: There is abdominal tenderness.          Comments: Inc with dermabond intact there in no drainage mild redness noted to the upper portion on the inc    Musculoskeletal:         General: Normal range of motion.      Cervical back: Normal range of motion.   Skin:     General: Skin is warm and dry.      Capillary Refill: Capillary refill takes less than 2 seconds. "   Neurological:      Mental Status: He is alert and oriented to person, place, and time.   Psychiatric:         Behavior: Behavior normal.         Thought Content: Thought content normal.         Judgment: Judgment normal.          Laboratory  CBC:   Recent Labs   Lab 06/11/25  0612 06/12/25  0845 06/14/25  2323 06/14/25  2335   WBC 5.39 4.46 4.94  --    RBC 3.59* 3.85* 3.78*  --    HGB 9.9* 10.8* 10.7*  --    HCT 31.9* 34.1* 32.4* 34*    224 252  --    MCV 89 89 86  --    MCH 27.6 28.1 28.3  --    MCHC 31.0* 31.7* 33.0  --      BMP:   Recent Labs   Lab 06/11/25  0612 06/12/25  0845 06/14/25  2323   GLU 89 85 99    139 141   K 3.4* 3.8 3.9   * 107 107   CO2 21* 26 24   BUN 34* 18 17   CREATININE 3.1* 1.8* 1.4   CALCIUM 8.7 9.2 9.5     CMP:   Recent Labs   Lab 06/09/25  0536 06/09/25  1403 06/11/25  0612 06/12/25  0845 06/14/25  2323   GLU 84   < > 89 85 99   CALCIUM 9.3   < > 8.7 9.2 9.5   ALBUMIN 3.7   < > 2.7* 3.0* 3.3*   PROT 6.9  --   --   --  6.6      < > 141 139 141   K 4.2   < > 3.4* 3.8 3.9   CO2 21*   < > 21* 26 24      < > 112* 107 107   BUN 93*   < > 34* 18 17   CREATININE 19.4*   < > 3.1* 1.8* 1.4   ALKPHOS 87  --   --   --  123   ALT 38  --   --   --  267*   AST 34  --   --   --  176*    < > = values in this interval not displayed.     Labs within the past 24 hours have been reviewed.    Diagnostic Results:  CT - Abd/Pelvic: No results found for this or any previous visit.

## 2025-06-15 NOTE — NURSING
Pt arrived unit via transport, c/o pain in RLQ incioin and BLE. Reports pain at a 9. Orthostatic bp taken, Vita FNP informed. VS wnl.  Muscle relaxant and oxy 5 ordered and administered. Mum at bedside. Bed locked and in lowest position; call light within reach.

## 2025-06-15 NOTE — PROGRESS NOTES
Discharge Medication Note:    Hospital Course:  Admitted from ER with hypertension, adjusted regimen. Changing labetalol to carvedilol and giving PRN hydralazine.      Met with Jeffrey Moreno at discharge to review discharge medications and to update the blue medication card.           Medication List        START taking these medications      carvediloL 6.25 MG tablet  Commonly known as: COREG  Take 2 tablets (12.5 mg total) by mouth 2 (two) times daily. Hold for SBP <120     hydrALAZINE 25 MG tablet  Commonly known as: APRESOLINE  Take 1 tablet (25 mg total) by mouth 2 (two) times daily as needed (BP > 180/90 and call coordinator).            CONTINUE taking these medications      cholecalciferol (vitamin D3) 50 mcg (2,000 unit) Tab  Commonly known as: VITAMIN D3  Take 1 tablet (2,000 Units total) by mouth once daily.     famotidine 20 MG tablet  Commonly known as: PEPCID  Take 1 tablet (20 mg total) by mouth every evening. Stop 7/11/25     gabapentin 100 MG capsule  Commonly known as: NEURONTIN  Take 1 capsule (100 mg total) by mouth 3 (three) times daily.     k phos di & mono-sod phos mono 250 mg Tab  Commonly known as: K-Phos-NeutraL  Take 2 tablets by mouth every 12 (twelve) hours.     methocarbamoL 500 MG Tab  Commonly known as: Robaxin  Take 1 tablet (500 mg total) by mouth 4 (four) times daily as needed (muscle spasms).     multivitamin Tab  Take 1 tablet by mouth once daily.     mycophenolate 250 mg Cap  Commonly known as: CELLCEPT  Take 4 capsules (1,000 mg total) by mouth 2 (two) times daily.     NIFEdipine 60 MG (OSM) 24 hr tablet  Commonly known as: PROCARDIA-XL  Take 1 tablet (60 mg total) by mouth 2 (two) times a day.     oxybutynin 5 MG Tab  Commonly known as: DITROPAN  Take 1 tablet (5 mg total) by mouth 3 (three) times daily as needed (Bladder spasms).     oxyCODONE 10 mg Tab immediate release tablet  Commonly known as: ROXICODONE  Take 1 tablet (10 mg total) by mouth every 4 (four) hours as  needed for Pain.     predniSONE 5 MG tablet  Commonly known as: DELTASONE  Take 20mg by mouth once daily from 6/11/25-6/17;  Take 15mg daily from 6/18-6/24;  Take 10mg daily from  6/25-7/1/25;  Take 5mg daily thereafter starting 7/2/25.     sulfamethoxazole-trimethoprim 400-80mg 400-80 mg per tablet  Commonly known as: BACTRIM,SEPTRA  Take 1 tablet by mouth every morning. Stop 12/6/25     tacrolimus 1 MG Cap  Commonly known as: PROGRAF  Take 10 capsules (10 mg total) by mouth every 12 (twelve) hours. Z94.0;Kidney txp on 6/9/25     valGANciclovir 450 mg Tab  Commonly known as: VALCYTE  Take 1 tablet (450 mg total) by mouth once daily. Stop 9/7/25            STOP taking these medications      labetaloL 300 MG tablet  Commonly known as: NORMODYNE               Where to Get Your Medications        These medications were sent to [facility]  [address] [place] [id number]      Hours: Always Open Phone: [phone]   carvediloL 6.25 MG tablet  hydrALAZINE 25 MG tablet            The following medications have been placed on HOLD and should be restarted in the outpatient setting (when appropriate): none    Jeffrey Moreno verbalized understanding and had the opportunity to ask questions.

## 2025-06-16 ENCOUNTER — LAB VISIT (OUTPATIENT)
Dept: LAB | Facility: HOSPITAL | Age: 30
End: 2025-06-16
Attending: STUDENT IN AN ORGANIZED HEALTH CARE EDUCATION/TRAINING PROGRAM
Payer: MEDICARE

## 2025-06-16 ENCOUNTER — PATIENT MESSAGE (OUTPATIENT)
Dept: TRANSPLANT | Facility: CLINIC | Age: 30
End: 2025-06-16

## 2025-06-16 ENCOUNTER — RESULTS FOLLOW-UP (OUTPATIENT)
Dept: TRANSPLANT | Facility: HOSPITAL | Age: 30
End: 2025-06-16

## 2025-06-16 ENCOUNTER — OFFICE VISIT (OUTPATIENT)
Dept: TRANSPLANT | Facility: CLINIC | Age: 30
End: 2025-06-16
Payer: MEDICARE

## 2025-06-16 VITALS
HEART RATE: 65 BPM | SYSTOLIC BLOOD PRESSURE: 160 MMHG | DIASTOLIC BLOOD PRESSURE: 95 MMHG | RESPIRATION RATE: 16 BRPM | OXYGEN SATURATION: 100 % | HEIGHT: 72 IN | BODY MASS INDEX: 21.23 KG/M2 | WEIGHT: 156.75 LBS | TEMPERATURE: 97 F

## 2025-06-16 DIAGNOSIS — Z94.0 TRANSPLANTED KIDNEY: Primary | ICD-10-CM

## 2025-06-16 DIAGNOSIS — Z94.0 KIDNEY REPLACED BY TRANSPLANT: ICD-10-CM

## 2025-06-16 DIAGNOSIS — I12.9 BENIGN HYPERTENSION WITH CKD (CHRONIC KIDNEY DISEASE), STAGE II: ICD-10-CM

## 2025-06-16 DIAGNOSIS — Z29.89 NEED FOR PROPHYLACTIC IMMUNOTHERAPY: ICD-10-CM

## 2025-06-16 DIAGNOSIS — Z79.899 IMMUNOSUPPRESSIVE MANAGEMENT ENCOUNTER FOLLOWING KIDNEY TRANSPLANT: ICD-10-CM

## 2025-06-16 DIAGNOSIS — E83.39 HYPOPHOSPHATEMIA: ICD-10-CM

## 2025-06-16 DIAGNOSIS — R74.01 TRANSAMINITIS: ICD-10-CM

## 2025-06-16 DIAGNOSIS — D84.821 IMMUNODEFICIENCY DUE TO LONG TERM IMMUNOSUPPRESSIVE DRUG THERAPY: ICD-10-CM

## 2025-06-16 DIAGNOSIS — N18.2 BENIGN HYPERTENSION WITH CKD (CHRONIC KIDNEY DISEASE), STAGE II: ICD-10-CM

## 2025-06-16 DIAGNOSIS — Z94.0 IMMUNOSUPPRESSIVE MANAGEMENT ENCOUNTER FOLLOWING KIDNEY TRANSPLANT: ICD-10-CM

## 2025-06-16 DIAGNOSIS — T45.1X5A IMMUNODEFICIENCY DUE TO LONG TERM IMMUNOSUPPRESSIVE DRUG THERAPY: ICD-10-CM

## 2025-06-16 DIAGNOSIS — D63.8 ANEMIA OF CHRONIC DISEASE: ICD-10-CM

## 2025-06-16 DIAGNOSIS — Z79.60 IMMUNODEFICIENCY DUE TO LONG TERM IMMUNOSUPPRESSIVE DRUG THERAPY: ICD-10-CM

## 2025-06-16 DIAGNOSIS — N18.2 STAGE 2 CHRONIC KIDNEY DISEASE: ICD-10-CM

## 2025-06-16 DIAGNOSIS — N25.81 SECONDARY HYPERPARATHYROIDISM: ICD-10-CM

## 2025-06-16 PROBLEM — R52 PAIN, UNSPECIFIED: Status: RESOLVED | Noted: 2024-06-14 | Resolved: 2025-06-16

## 2025-06-16 PROBLEM — R51.9 HEADACHE, UNSPECIFIED: Status: RESOLVED | Noted: 2024-06-14 | Resolved: 2025-06-16

## 2025-06-16 PROBLEM — T78.2XXA ANAPHYLACTIC SHOCK, UNSPECIFIED, INITIAL ENCOUNTER: Status: RESOLVED | Noted: 2024-06-14 | Resolved: 2025-06-16

## 2025-06-16 PROBLEM — Z01.84 ENCOUNTER FOR ANTIBODY RESPONSE EXAMINATION: Status: RESOLVED | Noted: 2024-06-14 | Resolved: 2025-06-16

## 2025-06-16 PROBLEM — R25.2 CRAMP AND SPASM: Status: RESOLVED | Noted: 2024-06-14 | Resolved: 2025-06-16

## 2025-06-16 PROBLEM — T85.71XA: Status: RESOLVED | Noted: 2024-06-14 | Resolved: 2025-06-16

## 2025-06-16 PROBLEM — E87.70 HYPERVOLEMIA: Status: RESOLVED | Noted: 2024-09-21 | Resolved: 2025-06-16

## 2025-06-16 PROBLEM — N17.9 ACUTE RENAL FAILURE: Status: RESOLVED | Noted: 2023-12-30 | Resolved: 2025-06-16

## 2025-06-16 PROBLEM — I10 SEVERE UNCONTROLLED HYPERTENSION: Status: RESOLVED | Noted: 2023-12-31 | Resolved: 2025-06-16

## 2025-06-16 PROBLEM — R19.7 DIARRHEA, UNSPECIFIED: Status: RESOLVED | Noted: 2024-06-14 | Resolved: 2025-06-16

## 2025-06-16 PROBLEM — K52.9 ENTERITIS: Status: RESOLVED | Noted: 2024-04-29 | Resolved: 2025-06-16

## 2025-06-16 PROBLEM — R93.2 ABNORMAL FINDINGS ON DIAGNOSTIC IMAGING OF LIVER: Status: RESOLVED | Noted: 2025-04-03 | Resolved: 2025-06-16

## 2025-06-16 PROBLEM — R07.9 CHEST PAIN, UNSPECIFIED: Status: RESOLVED | Noted: 2024-06-14 | Resolved: 2025-06-16

## 2025-06-16 PROBLEM — R03.0 ELEVATED BLOOD PRESSURE READING: Status: RESOLVED | Noted: 2025-06-15 | Resolved: 2025-06-16

## 2025-06-16 PROBLEM — R88.0 CLOUDY DIALYSIS EFFLUENT: Status: RESOLVED | Noted: 2024-06-14 | Resolved: 2025-06-16

## 2025-06-16 PROBLEM — T78.40XA ALLERGY, UNSPECIFIED, INITIAL ENCOUNTER: Status: RESOLVED | Noted: 2024-06-14 | Resolved: 2025-06-16

## 2025-06-16 PROBLEM — E83.49 OTHER DISORDERS OF MAGNESIUM METABOLISM: Status: RESOLVED | Noted: 2024-06-14 | Resolved: 2025-06-16

## 2025-06-16 PROBLEM — D50.9 IRON DEFICIENCY ANEMIA, UNSPECIFIED: Status: RESOLVED | Noted: 2024-06-14 | Resolved: 2025-06-16

## 2025-06-16 PROBLEM — Z99.2 DEPENDENCE ON RENAL DIALYSIS: Status: RESOLVED | Noted: 2024-06-14 | Resolved: 2025-06-16

## 2025-06-16 LAB
ABSOLUTE EOSINOPHIL (OHS): 0.25 K/UL
ABSOLUTE MONOCYTE (OHS): 0.62 K/UL (ref 0.3–1)
ABSOLUTE NEUTROPHIL COUNT (OHS): 4.52 K/UL (ref 1.8–7.7)
ALBUMIN SERPL BCP-MCNC: 3.6 G/DL (ref 3.5–5.2)
ANION GAP (OHS): 9 MMOL/L (ref 8–16)
BASOPHILS # BLD AUTO: 0.04 K/UL
BASOPHILS NFR BLD AUTO: 0.7 %
BUN SERPL-MCNC: 19 MG/DL (ref 6–20)
CALCIUM SERPL-MCNC: 10 MG/DL (ref 8.7–10.5)
CHLORIDE SERPL-SCNC: 109 MMOL/L (ref 95–110)
CO2 SERPL-SCNC: 22 MMOL/L (ref 23–29)
CREAT SERPL-MCNC: 1.3 MG/DL (ref 0.5–1.4)
ERYTHROCYTE [DISTWIDTH] IN BLOOD BY AUTOMATED COUNT: 15.7 % (ref 11.5–14.5)
GFR SERPLBLD CREATININE-BSD FMLA CKD-EPI: >60 ML/MIN/1.73/M2
GLUCOSE SERPL-MCNC: 79 MG/DL (ref 70–110)
HCT VFR BLD AUTO: 38.5 % (ref 40–54)
HGB BLD-MCNC: 12.1 GM/DL (ref 14–18)
IMM GRANULOCYTES # BLD AUTO: 0.04 K/UL (ref 0–0.04)
IMM GRANULOCYTES NFR BLD AUTO: 0.7 % (ref 0–0.5)
LYMPHOCYTES # BLD AUTO: 0.48 K/UL (ref 1–4.8)
MAGNESIUM SERPL-MCNC: 1.7 MG/DL (ref 1.6–2.6)
MCH RBC QN AUTO: 27.2 PG (ref 27–31)
MCHC RBC AUTO-ENTMCNC: 31.4 G/DL (ref 32–36)
MCV RBC AUTO: 87 FL (ref 82–98)
NUCLEATED RBC (/100WBC) (OHS): 0 /100 WBC
PHOSPHATE SERPL-MCNC: 2 MG/DL (ref 2.7–4.5)
PLATELET # BLD AUTO: 285 K/UL (ref 150–450)
PMV BLD AUTO: 10.4 FL (ref 9.2–12.9)
POTASSIUM SERPL-SCNC: 3.9 MMOL/L (ref 3.5–5.1)
RBC # BLD AUTO: 4.45 M/UL (ref 4.6–6.2)
RELATIVE EOSINOPHIL (OHS): 4.2 %
RELATIVE LYMPHOCYTE (OHS): 8.1 % (ref 18–48)
RELATIVE MONOCYTE (OHS): 10.4 % (ref 4–15)
RELATIVE NEUTROPHIL (OHS): 75.9 % (ref 38–73)
SODIUM SERPL-SCNC: 140 MMOL/L (ref 136–145)
TACROLIMUS BLD-MCNC: 8.7 NG/ML (ref 5–15)
WBC # BLD AUTO: 5.95 K/UL (ref 3.9–12.7)

## 2025-06-16 PROCEDURE — 80197 ASSAY OF TACROLIMUS: CPT

## 2025-06-16 PROCEDURE — 99999 PR PBB SHADOW E&M-EST. PATIENT-LVL V: CPT | Mod: PBBFAC,,, | Performed by: NURSE PRACTITIONER

## 2025-06-16 PROCEDURE — 83735 ASSAY OF MAGNESIUM: CPT

## 2025-06-16 PROCEDURE — 3077F SYST BP >= 140 MM HG: CPT | Mod: CPTII,S$GLB,, | Performed by: NURSE PRACTITIONER

## 2025-06-16 PROCEDURE — 99215 OFFICE O/P EST HI 40 MIN: CPT | Mod: S$GLB,,, | Performed by: NURSE PRACTITIONER

## 2025-06-16 PROCEDURE — 1111F DSCHRG MED/CURRENT MED MERGE: CPT | Mod: CPTII,S$GLB,, | Performed by: NURSE PRACTITIONER

## 2025-06-16 PROCEDURE — 3080F DIAST BP >= 90 MM HG: CPT | Mod: CPTII,S$GLB,, | Performed by: NURSE PRACTITIONER

## 2025-06-16 PROCEDURE — 85025 COMPLETE CBC W/AUTO DIFF WBC: CPT

## 2025-06-16 PROCEDURE — 3008F BODY MASS INDEX DOCD: CPT | Mod: CPTII,S$GLB,, | Performed by: NURSE PRACTITIONER

## 2025-06-16 PROCEDURE — 1159F MED LIST DOCD IN RCRD: CPT | Mod: CPTII,S$GLB,, | Performed by: NURSE PRACTITIONER

## 2025-06-16 PROCEDURE — 36415 COLL VENOUS BLD VENIPUNCTURE: CPT

## 2025-06-16 PROCEDURE — 80069 RENAL FUNCTION PANEL: CPT

## 2025-06-16 PROCEDURE — 3044F HG A1C LEVEL LT 7.0%: CPT | Mod: CPTII,S$GLB,, | Performed by: NURSE PRACTITIONER

## 2025-06-16 NOTE — Clinical Note
Plan:  Elevated LFTs;  HX ZAVALETA   NEEDS TO F/U WITH HEPATOLOGY apt made 7/10/25 9 am at [facility]  --lov 4/9/25 with VINEET FAUSTIN

## 2025-06-16 NOTE — PROGRESS NOTES
Kidney Post-Transplant Assessment    Referring Physician: Raj Florez  Current Nephrologist: Raj Florez    ORGAN: LEFT KIDNEY  Donor Type: donation after brain death  PHS Increased Risk: no  Cold Ischemia: 724 mins  Induction Medications:      Subjective:     CC:  Reassessment of renal allograft function and management of chronic immunosuppression.    HPI:  Mr. Moreno is a 29 y.o. year old Black or  male with PMH ESRD 2/2 HTN,  who received a donation after brain death kidney transplant on 6/9/25. His most recent creatinine is 1.3. He takes mycophenolate mofetil, prednisone, and tacrolimus for maintenance immunosuppression. His post transplant course has been uncomplicated to date.    6/14/25-6/15/25 readmitted: c/o pain in RLQ incioin and BLE ; HTN  Interval HX:   ZAVALETA was last seen by  hepatology on 4/9/25  Elevated LFTs    fx assessment     Intake; 2L + water and other liquids  UOP: no problems reported   Peripheral edema-no   Appetite-denies N/V/D; tolerating IS meds well   Wound-skin glue ; denies drainage   Reports mild abdominal pain taking ~ 1 pain pill a day   BP  BP Readings from Last 3 Encounters:   06/16/25 (!) 160/95   06/15/25 (!) 153/121   06/13/25 (!) 143/97       Lab /diagnostic results reviewed with patient today.   All questions answered    Past Medical History:   Diagnosis Date    Anemia     Disorder of kidney and ureter     Enteritis     Hypertension     LVH (left ventricular hypertrophy)     ZAVALETA (nonalcoholic steatohepatitis)     Prostatitis     Secondary hyperparathyroidism        Review of Systems   Constitutional:  Negative for activity change, appetite change, chills, fatigue, fever and unexpected weight change.   HENT:  Negative for congestion, facial swelling, postnasal drip, rhinorrhea, sinus pressure, sore throat and trouble swallowing.    Eyes:  Negative for pain, redness and visual disturbance.   Respiratory:  Negative for cough, chest tightness, shortness of  breath and wheezing.    Cardiovascular: Negative.  Negative for chest pain, palpitations and leg swelling.   Gastrointestinal:  Negative for abdominal pain, diarrhea, nausea and vomiting.   Genitourinary:  Negative for dysuria, flank pain and urgency.   Musculoskeletal:  Negative for gait problem, neck pain and neck stiffness.   Skin:  Positive for wound. Negative for rash.   Allergic/Immunologic: Positive for immunocompromised state. Negative for environmental allergies and food allergies.   Neurological:  Negative for dizziness, weakness, light-headedness and headaches.   Psychiatric/Behavioral:  Negative for agitation and confusion. The patient is not nervous/anxious.        Objective:   Blood pressure (!) 160/95, pulse 65, temperature 97.3 °F (36.3 °C), temperature source Temporal, resp. rate 16, height 6' (1.829 m), weight 71.1 kg (156 lb 12 oz), SpO2 100%.body mass index is 21.26 kg/m².    Physical Exam  Constitutional:       Appearance: Normal appearance. He is well-developed.   HENT:      Head: Normocephalic.      Nose: Nose normal.   Eyes:      Conjunctiva/sclera: Conjunctivae normal.      Pupils: Pupils are equal, round, and reactive to light.   Cardiovascular:      Rate and Rhythm: Normal rate and regular rhythm.      Heart sounds: Normal heart sounds.   Pulmonary:      Effort: Pulmonary effort is normal.      Breath sounds: Normal breath sounds.   Abdominal:      General: Bowel sounds are normal.      Palpations: Abdomen is soft.      Comments:  Skin glue intact. Incision well approximated. No erythema or drainage.  Mild edema to RQ .   No bruit noted over the allograft        Musculoskeletal:      Cervical back: Normal range of motion and neck supple.   Skin:     General: Skin is warm and dry.   Neurological:      Mental Status: He is alert and oriented to person, place, and time.   Psychiatric:         Behavior: Behavior normal.         Labs:  Lab Results   Component Value Date    WBC 5.95 06/16/2025  "   HGB 12.1 (L) 06/16/2025    HCT 38.5 (L) 06/16/2025     06/16/2025    K 3.9 06/16/2025     06/16/2025    CO2 22 (L) 06/16/2025    BUN 19 06/16/2025    CREATININE 1.3 06/16/2025    EGFRNORACEVR >60 06/16/2025    CALCIUM 10.0 06/16/2025    PHOS 2.0 (L) 06/16/2025    MG 1.7 06/16/2025    ALBUMIN 3.6 06/16/2025     (H) 06/15/2025     (H) 06/15/2025    UTPCR 0.84 (H) 06/10/2025    .1 (H) 06/12/2025    TACROLIMUS 8.7 06/16/2025       No results found for: "EXTANC", "EXTWBC", "EXTSEGS", "EXTPLATELETS", "EXTHEMOGLOBI", "EXTHEMATOCRI", "EXTCREATININ", "EXTSODIUM", "EXTPOTASSIUM", "EXTBUN", "EXTCO2", "EXTCALCIUM", "EXTPHOSPHORU", "EXTGLUCOSE", "EXTALBUMIN", "EXTAST", "EXTALT", "EXTBILITOTAL", "EXTLIPASE", "EXTAMYLASE"    No results found for: "EXTCYCLOSLVL", "EXTSIROLIMUS", "EXTTACROLVL", "EXTPROTCRE", "EXTPTHINTACT", "EXTPROTEINUA", "EXTWBCUA", "EXTRBCUA"    Labs were reviewed with the patient    Assessment:     1. Transplanted kidney    2. Immunodeficiency due to long term immunosuppressive drug therapy    3. Stage 2 chronic kidney disease    4. Benign hypertension with CKD (chronic kidney disease), stage II    5. Secondary hyperparathyroidism    6. Anemia of chronic disease    7. Need for prophylactic immunotherapy    8. Hypophosphatemia    9. Transaminitis        Plan:    Elevated LFTs;  HX ZAVALETA    NEEDS TO F/U WITH HEPATOLOGY apt made 7/10/25 at  9 am at [facility]   --lov 4/9/25 with VINEET Kelley NP-C      1) s/p  kidney transplant              - Graft function CJD 2, stable    -FK Trough 8.7   - cont on FK  10/10   - cont on Cellcept  1000 mg BID   -prednisone  taper   - Bactrim 400/80 mg QD for PCP prophylaxis--12/6/25   -  Valcyte 50 mg QD for CMV prophylaxis--9/7/25  -Will continue to monitor for drug toxicities     Lab Results   Component Value Date    CREATININE 1.3 06/16/2025      eGFR >60        Elevated LFTs;  HX ZAVALETA  --mgmt deferred to hepatology   6/15/25 Hep panel " (-)  6/9/25 HCV PCR (-)  Lab Results   Component Value Date     (H) 06/15/2025     (H) 06/15/2025    ALKPHOS 123 06/15/2025    BILITOT 0.3 06/15/2025         2)  HTN: advise low salt diet and home BP monitoring  - Cont  coreg, hydralazine, nifedipine   BP Readings from Last 3 Encounters:   06/16/25 (!) 160/95   06/15/25 (!) 153/121   06/13/25 (!) 143/97          3) Hypophosphatemia, Hypomagnesemia, Secondary hyperparathyroidism:  - no intervention required ,will continue to monitor/ guidelines                -continue KPN, Vit D      Lab Results   Component Value Date    .1 (H) 06/12/2025    CALCIUM 10.0 06/16/2025    PHOS 2.0 (L) 06/16/2025     Magnesium 1.7       4) Metabolic acidosis/Electrolyte balance:  - no intervention required ,will continue to monitor/ guidelines    - cont on    Lab Results   Component Value Date     06/16/2025    K 3.9 06/16/2025     06/16/2025    CO2 22 (L) 06/16/2025         5) Anemia/Cytopenias:   will monitor as per our guidelines. Medicine list reviewed including potential causes of drug-induced cytopenias                - no intervention required ,will continue to monitor/ guidelines      Lab Results   Component Value Date    WBC 5.95 06/16/2025    HGB 12.1 (L) 06/16/2025    HCT 38.5 (L) 06/16/2025    MCV 87 06/16/2025     06/16/2025           6) Proteinuria: continue p/c ratio as per guidelines   Protein Creatinine Ratios:       Urine Protein/Creatinine Ratio   Date Value Ref Range Status   06/10/2025 0.84 (H) <=0.20 Final        .     7) BK virus infection screening:  will continue to monitor/ guidelines       8) Weight education: provided during the clinic visit   Body mass index is 21.26 kg/m².        Follow-up:   Clinic: return to transplant clinic weekly for the first month after transplant; every 2 weeks during months 2-3; then at 6-, 9-, 12-, 18-, 24-, and 36- months post-transplant to reassess for complications from immunosuppression  toxicity and monitor for rejection.  Annually thereafter.    Labs: since patient remains at high risk for rejection and drug-related complications that warrant close monitoring, labs will be ordered as follows: continue twice weekly CBC, renal panel, and drug level for first month; then same labs once weekly through 3rd month post-transplant.  Urine for UA and protein/creatinine ratio monthly.  Serum BK - PCR at 1-, 3-, 6-, 9-, 12-, 18-, 24-, 36-, 48-, and 60 months post-transplant.  Hepatic panel at 1-, 2-, 3-, 6-, 9-, 12-, 18-, 24-, and 36- months post-transplant.    Elisa Amaro NP       Education:   Material provided to the patient.  Patient reminded to call with any health changes since these can be early signs of significant complications.  Also, I advised the patient to be sure any new medications or changes of old medications are discussed with either a pharmacist or physician knowledgeable with transplant to avoid rejection/drug toxicity related to significant drug interactions.    Patient advised that it is recommended that all transplanted patients, and their close contacts and household members receive Covid vaccination.

## 2025-06-16 NOTE — PROGRESS NOTES
"1ST NURSING VISIT POST DISCHARGE NOTE    1st RN appointment with Jeffrey Moreno post discharge 6/11/2025 s/p kidney transplant 6/9/25.  Patient's mother accompanied him.  Patient reports incisional pain & left foot pain.  Patient says that he that he is not sleeping well.  Incision intact with Dermabond. Patient that he is able to explain daily incision care and showering instructions.  Reviewed I&O monitoring, measuring, and recording, and the need for hydration (i.e., at least 2 liters of water daily with minimal caffeine and no grapefruit products).  Medication list and rationale were reviewed.  Patient did bring blue medication card and medication bottles for review with PharmD.  Patient reports that he has stopped Dulcolax and has not continued Colace.  Patient has had a bowel movement.  Patient expressed understanding of daily care including BID VS, medications, and I&O documentation.  Patient made aware of today's creatinine level: 1.8.  Patient aware that coordinator will review today's labs with a transplant physician and call the patient with any dose changes indicated.  Next lab appointment scheduled for 6/16/2025.  First post-operative transplant team appointment with labs scheduled for 6/16/2025.    Using the Kidney Transplant Patient Reference Manual, the patient submitted his open book "Self-assessment of Kidney Transplant Patient Knowledge" test, which was completed in the transplant clinic this morning before 1st nursing visit.  This test includes questions regarding critical dose medications commonly used after kidney transplant, medication dosing and side effects, importance of timed lab draws, important signs and symptoms to report 24/7 immediately post-transplant as well as how to contact the transplant team 24/7.    Test Score: 24/25    After completing the test, the patient was given a copy of the Self-assessment Answer Key to reinforce accurate learning of test content.  Patient expressed his " understanding of the value of the information included in the self-assessment test.    Patient instructed on correct procedure for collection of midstream clean catch urine specimen and vebalizes understanding.

## 2025-06-19 ENCOUNTER — LAB VISIT (OUTPATIENT)
Dept: LAB | Facility: HOSPITAL | Age: 30
End: 2025-06-19
Attending: STUDENT IN AN ORGANIZED HEALTH CARE EDUCATION/TRAINING PROGRAM
Payer: MEDICARE

## 2025-06-19 DIAGNOSIS — Z94.0 IMMUNOSUPPRESSIVE MANAGEMENT ENCOUNTER FOLLOWING KIDNEY TRANSPLANT: ICD-10-CM

## 2025-06-19 DIAGNOSIS — Z94.0 KIDNEY REPLACED BY TRANSPLANT: ICD-10-CM

## 2025-06-19 DIAGNOSIS — Z79.899 IMMUNOSUPPRESSIVE MANAGEMENT ENCOUNTER FOLLOWING KIDNEY TRANSPLANT: ICD-10-CM

## 2025-06-19 LAB
ABSOLUTE EOSINOPHIL (OHS): 0.33 K/UL
ABSOLUTE MONOCYTE (OHS): 0.6 K/UL (ref 0.3–1)
ABSOLUTE NEUTROPHIL COUNT (OHS): 3.53 K/UL (ref 1.8–7.7)
ALBUMIN SERPL BCP-MCNC: 3.5 G/DL (ref 3.5–5.2)
ANION GAP (OHS): 10 MMOL/L (ref 8–16)
BASOPHILS # BLD AUTO: 0.09 K/UL
BASOPHILS NFR BLD AUTO: 1.7 %
BUN SERPL-MCNC: 14 MG/DL (ref 6–20)
CALCIUM SERPL-MCNC: 10.4 MG/DL (ref 8.7–10.5)
CHLORIDE SERPL-SCNC: 102 MMOL/L (ref 95–110)
CO2 SERPL-SCNC: 25 MMOL/L (ref 23–29)
CREAT SERPL-MCNC: 1.3 MG/DL (ref 0.5–1.4)
ERYTHROCYTE [DISTWIDTH] IN BLOOD BY AUTOMATED COUNT: 16.3 % (ref 11.5–14.5)
GFR SERPLBLD CREATININE-BSD FMLA CKD-EPI: >60 ML/MIN/1.73/M2
GLUCOSE SERPL-MCNC: 82 MG/DL (ref 70–110)
HCT VFR BLD AUTO: 41.9 % (ref 40–54)
HGB BLD-MCNC: 12.9 GM/DL (ref 14–18)
IMM GRANULOCYTES # BLD AUTO: 0.02 K/UL (ref 0–0.04)
IMM GRANULOCYTES NFR BLD AUTO: 0.4 % (ref 0–0.5)
LYMPHOCYTES # BLD AUTO: 0.73 K/UL (ref 1–4.8)
MAGNESIUM SERPL-MCNC: 1.7 MG/DL (ref 1.6–2.6)
MCH RBC QN AUTO: 27.9 PG (ref 27–31)
MCHC RBC AUTO-ENTMCNC: 30.8 G/DL (ref 32–36)
MCV RBC AUTO: 91 FL (ref 82–98)
NUCLEATED RBC (/100WBC) (OHS): 0 /100 WBC
PHOSPHATE SERPL-MCNC: 2.6 MG/DL (ref 2.7–4.5)
PLATELET # BLD AUTO: 380 K/UL (ref 150–450)
PMV BLD AUTO: 10.9 FL (ref 9.2–12.9)
POTASSIUM SERPL-SCNC: 4.2 MMOL/L (ref 3.5–5.1)
RBC # BLD AUTO: 4.62 M/UL (ref 4.6–6.2)
RELATIVE EOSINOPHIL (OHS): 6.2 %
RELATIVE LYMPHOCYTE (OHS): 13.8 % (ref 18–48)
RELATIVE MONOCYTE (OHS): 11.3 % (ref 4–15)
RELATIVE NEUTROPHIL (OHS): 66.6 % (ref 38–73)
SODIUM SERPL-SCNC: 137 MMOL/L (ref 136–145)
WBC # BLD AUTO: 5.3 K/UL (ref 3.9–12.7)

## 2025-06-19 PROCEDURE — 83735 ASSAY OF MAGNESIUM: CPT

## 2025-06-19 PROCEDURE — 80197 ASSAY OF TACROLIMUS: CPT

## 2025-06-19 PROCEDURE — 80069 RENAL FUNCTION PANEL: CPT

## 2025-06-19 PROCEDURE — 36415 COLL VENOUS BLD VENIPUNCTURE: CPT | Mod: PO

## 2025-06-19 PROCEDURE — 85025 COMPLETE CBC W/AUTO DIFF WBC: CPT

## 2025-06-20 ENCOUNTER — PATIENT MESSAGE (OUTPATIENT)
Dept: TRANSPLANT | Facility: CLINIC | Age: 30
End: 2025-06-20
Payer: MEDICARE

## 2025-06-20 ENCOUNTER — RESULTS FOLLOW-UP (OUTPATIENT)
Dept: TRANSPLANT | Facility: CLINIC | Age: 30
End: 2025-06-20

## 2025-06-20 LAB
BACTERIA BLD CULT: NORMAL
BACTERIA BLD CULT: NORMAL
TACROLIMUS BLD-MCNC: 8.5 NG/ML (ref 5–15)

## 2025-06-23 LAB
EXT ALBUMIN: 4.1 (ref 3.5–5.7)
EXT BUN: 19 (ref 7–25)
EXT CALCIUM: 10.2 (ref 8.6–10.3)
EXT CHLORIDE: 105 (ref 98–107)
EXT CO2: 29 (ref 21–31)
EXT CREATININE: 1.35 MG/DL (ref 0.6–1.3)
EXT EGFR NO RACE VARIABLE: 73
EXT EOSINOPHIL%: 5 (ref 0–7)
EXT GLUCOSE: 100 (ref 74–109)
EXT HEMATOCRIT: 36.9 (ref 42–52)
EXT HEMOGLOBIN: 12.6 (ref 14–18)
EXT LYMPH%: 15 (ref 21–47)
EXT MAGNESIUM: 1.7 (ref 1.9–2.7)
EXT MONOCYTES%: 6 (ref 2–11)
EXT PHOSPHORUS: 2.4 (ref 2.5–5)
EXT PLATELETS: 330 (ref 150–375)
EXT POTASSIUM: 4.1 (ref 3.5–5.1)
EXT SEGS%: 73 (ref 44–81)
EXT SODIUM: 139 MMOL/L (ref 136–145)
EXT TACROLIMUS LVL: 6.7
EXT WBC: 5.6 (ref 4–11)

## 2025-06-25 NOTE — PROGRESS NOTES
Kidney Post-Transplant Assessment    Referring Physician: Raj Florez  Current Nephrologist: Raj Florez    ORGAN: LEFT KIDNEY  Donor Type: donation after brain death  PHS Increased Risk: no  Cold Ischemia: 724 mins  Induction Medications:      Subjective:     CC:  Reassessment of renal allograft function and management of chronic immunosuppression.    HPI:  Mr. Moreno is a 30 y.o. year old Black or  male with PMH ESRD 2/2 HTN,  who received a donation after brain death kidney transplant on 6/9/25. His most recent creatinine is 1.15. He takes mycophenolate mofetil, prednisone, and tacrolimus for maintenance immunosuppression. His post transplant course has been uncomplicated to date.       LOV 6/16/25  Interval HX:  ZAVALETA was last seen by  hepatology on 4/3/25  Elevated LFTs--scheduled to see hepatology 7/10/25  Ureteral stent removed today in urology   Is seeing txp surgery today for wound assessment    fx assessment -over all doing well, no c/o today     Intake: 2L + water and other liquids  UOP: no problems reported   Peripheral edema-no   Weight-stable   Appetite-denies N/V/D; tolerating IS meds well   Wound-skin glue -healing     BP  BP Readings from Last 3 Encounters:   07/02/25 134/81   07/02/25 134/81   07/02/25 130/77       Lab /diagnostic results reviewed with patient today.   All questions answered    Past Medical History:   Diagnosis Date    Anemia     Disorder of kidney and ureter     Enteritis     Hypertension     LVH (left ventricular hypertrophy)     ZAVALETA (nonalcoholic steatohepatitis)     Prostatitis     Secondary hyperparathyroidism        Review of Systems   Constitutional:  Negative for activity change, appetite change, chills, fatigue, fever and unexpected weight change.   HENT:  Negative for congestion, facial swelling, postnasal drip, rhinorrhea, sinus pressure, sore throat and trouble swallowing.    Eyes:  Negative for pain, redness and visual disturbance.   Respiratory:   Negative for cough, chest tightness, shortness of breath and wheezing.    Cardiovascular: Negative.  Negative for chest pain, palpitations and leg swelling.   Gastrointestinal:  Negative for abdominal pain, diarrhea, nausea and vomiting.   Genitourinary:  Negative for dysuria, flank pain and urgency.   Musculoskeletal:  Negative for gait problem, neck pain and neck stiffness.   Skin:  Positive for wound. Negative for rash.   Allergic/Immunologic: Positive for immunocompromised state. Negative for environmental allergies and food allergies.   Neurological:  Negative for dizziness, weakness, light-headedness and headaches.   Psychiatric/Behavioral:  Negative for agitation and confusion. The patient is not nervous/anxious.        Objective:   Blood pressure 134/81, pulse 89, temperature 97.7 °F (36.5 °C), temperature source Temporal, resp. rate (!) 22, height 6' (1.829 m), weight 68.7 kg (151 lb 7.3 oz), SpO2 99%.body mass index is 20.54 kg/m².    Physical Exam  Constitutional:       Appearance: Normal appearance. He is well-developed.   HENT:      Head: Normocephalic.      Nose: Nose normal.   Eyes:      Conjunctiva/sclera: Conjunctivae normal.      Pupils: Pupils are equal, round, and reactive to light.   Cardiovascular:      Rate and Rhythm: Normal rate and regular rhythm.      Heart sounds: Normal heart sounds.   Pulmonary:      Effort: Pulmonary effort is normal.      Breath sounds: Normal breath sounds.   Abdominal:      General: Bowel sounds are normal.      Palpations: Abdomen is soft.      Comments: No bruit noted over the allograft   Incision healing -skin glue intact  erythema, edema or drainage      Musculoskeletal:      Cervical back: Normal range of motion and neck supple.   Skin:     General: Skin is warm and dry.   Neurological:      Mental Status: He is alert and oriented to person, place, and time.   Psychiatric:         Behavior: Behavior normal.         Labs:  Lab Results   Component Value Date     WBC 5.30 06/19/2025    HGB 12.9 (L) 06/19/2025    HCT 41.9 06/19/2025     06/30/2025    K 3.4 (L) 06/30/2025     06/30/2025    CO2 30 06/30/2025    BUN 15 06/30/2025    CREATININE 1.15 06/30/2025    EGFRNORACEVR >60 06/19/2025    CALCIUM 10 06/30/2025    PHOS 2.6 (L) 06/19/2025    MG 1.7 06/19/2025    ALBUMIN 4.2 06/30/2025     (H) 06/15/2025     (H) 06/15/2025    UTPCR 0.23 (H) 06/19/2025    .1 (H) 06/12/2025    TACROLIMUS 8.5 06/19/2025       Lab Results   Component Value Date    EXTWBC 3.8 (L) 06/30/2025    EXTSEGS 66 06/30/2025    EXTPLATELETS 313 06/30/2025    EXTHEMOGLOBI 12.3 (L) 06/30/2025    EXTHEMATOCRI 35.9 (L) 06/30/2025    EXTCREATININ 1.15 06/30/2025    EXTSODIUM 142 06/30/2025    EXTPOTASSIUM 3.4 (L) 06/30/2025    EXTBUN 15 06/30/2025    EXTCO2 30 06/30/2025    EXTCALCIUM 10.0 06/30/2025    EXTPHOSPHORU 2.2 (L) 06/30/2025    EXTGLUCOSE 97 06/30/2025    EXTALBUMIN 4.2 06/30/2025       Lab Results   Component Value Date    EXTTACROLVL PENDING 06/30/2025       Labs were reviewed with the patient    Assessment:     1. Transplanted kidney    2. Immunodeficiency due to long term immunosuppressive drug therapy    3. Benign hypertension with CKD (chronic kidney disease), stage II    4. ZAVALETA (nonalcoholic steatohepatitis)    5. Secondary hyperparathyroidism    6. Stage 2 chronic kidney disease    7. Transaminitis    8. At risk for opportunistic infections        Plan:   Elevated LFTs;  HX ZAVALETA    F/U WITH HEPATOLOGY; apt on  7/10/25 at  9 am at [facility]   --with L. Houston-Kula NP-C  Hypokalemia: encouraged high K diet     1) s/p  kidney transplant              - Graft function    -FK Trough pending    - cont on FK  10/10   - cont on Cellcept  1000 mg BID   -prednisone  taper   - Bactrim 400/80 mg QD for PCP prophylaxis--12/6/25   -  Valcyte 50 mg QD for CMV prophylaxis--9/7/25  -Will continue to monitor for drug toxicities     Lab Results   Component Value Date     CREATININE 1.15 06/30/2025      eGFR 88       Elevated LFTs;  HX ZAVALETA    F/U WITH HEPATOLOGY; apt on  7/10/25 at  9 am at [facility]   --with VINEET FAUSTIN  Lab Results   Component Value Date     (H) 06/15/2025     (H) 06/15/2025    ALKPHOS 123 06/15/2025    BILITOT 0.3 06/15/2025         2)  HTN: advise low salt diet and home BP monitoring  - Cont  coreg, hydralazine, nifedipine   BP Readings from Last 3 Encounters:   07/02/25 134/81   07/02/25 134/81   07/02/25 130/77          3) Hypophosphatemia, Hypomagnesemia, Secondary hyperparathyroidism:  - no intervention required ,will continue to monitor/ guidelines                -continue KPN, Vit D , Mg ox, calcitriol as prescribed   6/30/25          4) Metabolic acidosis/Electrolyte balance:  - no intervention required ,will continue to monitor/ guidelines    - high K diet   Lab Results   Component Value Date     06/30/2025    K 3.4 (L) 06/30/2025     06/30/2025    CO2 30 06/30/2025         5) Anemia/Cytopenias:   will monitor as per our guidelines. Medicine list reviewed including potential causes of drug-induced cytopenias                - no intervention required ,will continue to monitor/ guidelines      6/30/25           6) Proteinuria: continue p/c ratio as per guidelines   Protein Creatinine Ratios:     Urine Protein/Creatinine Ratio   Date Value Ref Range Status   06/19/2025 0.23 (H) <=0.20 Final   06/10/2025 0.84 (H) <=0.20 Final          7) BK virus infection screening:  will continue to monitor/ guidelines       8) Weight education: provided during the clinic visit   Body mass index is 20.54 kg/m².        Follow-up:   Clinic: return to transplant clinic weekly for the first month after transplant; every 2 weeks during months 2-3; then at 6-, 9-, 12-, 18-, 24-, and 36- months post-transplant to reassess for complications from immunosuppression toxicity and monitor for rejection.  Annually thereafter.    Labs: since  patient remains at high risk for rejection and drug-related complications that warrant close monitoring, labs will be ordered as follows: continue twice weekly CBC, renal panel, and drug level for first month; then same labs once weekly through 3rd month post-transplant.  Urine for UA and protein/creatinine ratio monthly.  Serum BK - PCR at 1-, 3-, 6-, 9-, 12-, 18-, 24-, 36-, 48-, and 60 months post-transplant.  Hepatic panel at 1-, 2-, 3-, 6-, 9-, 12-, 18-, 24-, and 36- months post-transplant.    Elisa Amaro NP       Education:   Material provided to the patient.  Patient reminded to call with any health changes since these can be early signs of significant complications.  Also, I advised the patient to be sure any new medications or changes of old medications are discussed with either a pharmacist or physician knowledgeable with transplant to avoid rejection/drug toxicity related to significant drug interactions.    Patient advised that it is recommended that all transplanted patients, and their close contacts and household members receive Covid vaccination.

## 2025-06-26 ENCOUNTER — DOCUMENTATION ONLY (OUTPATIENT)
Dept: TRANSPLANT | Facility: CLINIC | Age: 30
End: 2025-06-26
Payer: MEDICARE

## 2025-06-26 LAB
EXT ALBUMIN: 4.3 (ref 3.5–5.7)
EXT ALKALINE PHOSPHATASE: ABNORMAL
EXT ALLOSURE: ABNORMAL
EXT ALT: ABNORMAL
EXT AMYLASE: ABNORMAL
EXT ANC: ABNORMAL
EXT AST: ABNORMAL
EXT BACTERIA UA: ABNORMAL
EXT BANDS%: ABNORMAL
EXT BILIRUBIN DIRECT: ABNORMAL
EXT BILIRUBIN TOTAL: ABNORMAL
EXT BK VIRUS DNA QN PCR: ABNORMAL
EXT BUN: 14 (ref 7–25)
EXT C PEPTIDE: ABNORMAL
EXT CALCIUM: 10.4 (ref 8.6–10.3)
EXT CHLORIDE: 104 (ref 98–107)
EXT CHOLESTEROL: ABNORMAL
EXT CMV DNA QUANT. BY PCR: ABNORMAL
EXT CO2: 29 (ref 21–31)
EXT CREATININE UA: ABNORMAL
EXT CREATININE: 1.18 MG/DL (ref 0.6–1.3)
EXT CYCLOSPORINE LVL: ABNORMAL
EXT EBV DNA BY PCR: ABNORMAL
EXT EBV IGG: ABNORMAL
EXT EGFR NO RACE VARIABLE: 86
EXT EOSINOPHIL%: 8 (ref 0–7)
EXT FERRITIN: ABNORMAL
EXT GFR MDRD AF AMER: ABNORMAL
EXT GFR MDRD NON AF AMER: ABNORMAL
EXT GLUCOSE UA: ABNORMAL
EXT GLUCOSE: 90 (ref 74–109)
EXT HBV DNA QUANT PCR: ABNORMAL
EXT HCV QUANT: ABNORMAL
EXT HDL: ABNORMAL
EXT HEMATOCRIT: 37.2 (ref 42–52)
EXT HEMOGLOBIN A1C: ABNORMAL
EXT HEMOGLOBIN: 12.8 (ref 14–18)
EXT HIV RNA QUANT PCR: ABNORMAL
EXT IMMUNKNOW (STIMULATED): ABNORMAL
EXT IRON SATURATION: ABNORMAL
EXT LDH, TOTAL: ABNORMAL
EXT LDL CHOLESTEROL: ABNORMAL
EXT LEFLUNOMIDE METABOLITE: ABNORMAL
EXT LIPASE: ABNORMAL
EXT LYMPH%: 18 (ref 21–47)
EXT MAGNESIUM: 1.4 (ref 1.9–2.7)
EXT MONOCYTES%: 9 (ref 2–11)
EXT NITRITES UA: ABNORMAL
EXT PHOSPHORUS: 1.8 (ref 2.5–5)
EXT PLATELETS: 293 (ref 150–375)
EXT POTASSIUM: 3.6 (ref 3.5–5.1)
EXT PROT/CREAT RATIO UR: ABNORMAL
EXT PROTEIN TOTAL: ABNORMAL
EXT PROTEIN UA: ABNORMAL
EXT PTH, INTACT: ABNORMAL
EXT RBC UA: ABNORMAL
EXT SEGS%: 64 (ref 44–81)
EXT SERUM IRON: ABNORMAL
EXT SIROLIMUS LVL: ABNORMAL
EXT SODIUM: 138 MMOL/L (ref 136–145)
EXT TACROLIMUS LVL: 5.2 (ref 5–15)
EXT TIBC: ABNORMAL
EXT TRIGLYCERIDES: ABNORMAL
EXT URIC ACID: ABNORMAL
EXT URINE CULTURE: ABNORMAL
EXT URINE PROTEIN: ABNORMAL
EXT VIT D 25 HYDROXY: ABNORMAL
EXT WBC UA: ABNORMAL
EXT WBC: 4.2 (ref 4–11)
PARVOVIRUS B19 DNA BY PCR: ABNORMAL
QUANTIFERON GOLD TB: ABNORMAL

## 2025-06-27 ENCOUNTER — RESULTS FOLLOW-UP (OUTPATIENT)
Dept: TRANSPLANT | Facility: CLINIC | Age: 30
End: 2025-06-27
Payer: MEDICARE

## 2025-06-27 DIAGNOSIS — E83.42 HYPOMAGNESEMIA: Primary | ICD-10-CM

## 2025-06-27 PROBLEM — I10 ESSENTIAL HYPERTENSION: Chronic | Status: RESOLVED | Noted: 2023-12-31 | Resolved: 2025-06-27

## 2025-06-27 NOTE — TELEPHONE ENCOUNTER
Notified patient of Dr. Fleming's review of 6/23/25 lab results via telephone & [facility] message. Instructed patient to start magnesium 400mg twice daily. Instructed patient to take the magnesium at least 2 hours apart from his 8AM & 8PM his other medications. Patient verbalized understanding.     ----- Message from Antonina Fleming DO sent at 6/27/2025  1:14 PM CDT -----  Baseline graft function of 1.1-1.3.   Acceptable FK level  Start on MgOx 400 BID   ----- Message -----  From: Kaylah Campos RN  Sent: 6/26/2025   5:10 PM CDT  To: Gelacio Tyler Jr., MD

## 2025-06-30 ENCOUNTER — DOCUMENTATION ONLY (OUTPATIENT)
Dept: TRANSPLANT | Facility: CLINIC | Age: 30
End: 2025-06-30
Payer: MEDICARE

## 2025-06-30 ENCOUNTER — RESULTS FOLLOW-UP (OUTPATIENT)
Dept: TRANSPLANT | Facility: CLINIC | Age: 30
End: 2025-06-30
Payer: MEDICARE

## 2025-06-30 DIAGNOSIS — E83.39 HYPOPHOSPHATEMIA: Primary | ICD-10-CM

## 2025-06-30 DIAGNOSIS — Z94.0 KIDNEY REPLACED BY TRANSPLANT: ICD-10-CM

## 2025-06-30 LAB
EXT ALBUMIN: 4.2 (ref 3.5–5.7)
EXT BUN: 15 (ref 7–25)
EXT CALCIUM: 10 (ref 8.6–10.3)
EXT CHLORIDE: 106 (ref 98–107)
EXT CO2: 30 (ref 21–31)
EXT CREATININE: 1.15 MG/DL (ref 0.6–1.3)
EXT EGFR NO RACE VARIABLE: 88 (ref 61–120)
EXT EOSINOPHIL%: 2 (ref 0–1)
EXT GLUCOSE: 97 (ref 74–109)
EXT HEMATOCRIT: 35.9 (ref 42–52)
EXT HEMOGLOBIN: 12.3 (ref 14–18)
EXT LYMPH%: 17 (ref 21–47)
EXT MAGNESIUM: 1.7 (ref 1.9–2.7)
EXT MONOCYTES%: 8 (ref 2–11)
EXT PHOSPHORUS: 2.2 (ref 2.5–5)
EXT PLATELETS: 313 (ref 150–375)
EXT POTASSIUM: 3.4 (ref 3.5–5.1)
EXT SEGS%: 66 (ref 44–81)
EXT SODIUM: 142 MMOL/L (ref 136–145)
EXT TACROLIMUS LVL: ABNORMAL
EXT WBC: 3.8 (ref 4–11)

## 2025-06-30 RX ORDER — LANOLIN ALCOHOL/MO/W.PET/CERES
400 CREAM (GRAM) TOPICAL 2 TIMES DAILY
Qty: 60 TABLET | Refills: 11 | Status: SHIPPED | OUTPATIENT
Start: 2025-06-30 | End: 2026-06-30

## 2025-07-01 ENCOUNTER — TELEPHONE (OUTPATIENT)
Dept: UROLOGY | Facility: CLINIC | Age: 30
End: 2025-07-01
Payer: MEDICARE

## 2025-07-01 ENCOUNTER — RESULTS FOLLOW-UP (OUTPATIENT)
Dept: TRANSPLANT | Facility: HOSPITAL | Age: 30
End: 2025-07-01

## 2025-07-01 RX ORDER — CALCITRIOL 0.25 UG/1
0.25 CAPSULE ORAL DAILY
Qty: 90 CAPSULE | Refills: 3 | Status: SHIPPED | OUTPATIENT
Start: 2025-07-01 | End: 2025-07-03

## 2025-07-01 RX ORDER — KETOCONAZOLE 200 MG/1
100 TABLET ORAL DAILY
Qty: 45 TABLET | Refills: 3 | Status: SHIPPED | OUTPATIENT
Start: 2025-07-01 | End: 2025-07-03

## 2025-07-01 NOTE — TELEPHONE ENCOUNTER
Notified patient of Dr. Tyler's review of 6/26/25 lab results via telephone & [facility] message. Informed patient of low phosphorus level 1.8. patient denies experiencing symptoms of SOB, weakness or palpitations. Instructed patient to report to nearest ER if he starts experiencing any of the symptoms. Patient reports his phosphorus level drawn today is higher 2.2. Reports over the weekend he has increased his intake of high phosphorus foods &* he's currently taking K Phos 500mg twice daily. Patient report the tacrolimus level is a true 12 hour level. Informed patient he will need to start ketoconazole to help boost up his prograf/tacrolimus level.     [facility] message sent to patient:  Ilya Murphy,     Dr. Tyler reviewed your 6/26/25 lab results. Your phosphorus level was 1.8. He wants you to continue taking K Phos Neutral (phosphorus) supplements 500mg (2 of the 250mg tablets) twice daily.  Please increase your intake of high phosphorus foods. We received the medication from the patient assistance program for the K Phos Neutral. When you come to clinic this Wed please  the medication (K Phos Neutral) from the Transplant Clinic.      Your phosphorus level from today's labs is a little higher 2.2.     He also wants you to start Calcitriol 0.25mcg daily. This prescription will be sent to your local [facility] pharmacy. Write this medication on your blue medication card. Take it every morning.     Lastly, your prograf level 5.2 is low. He wants you to start taking a medication called ketoconazole to help boost up his prograf/tacrolimus level. This prescription will be sent to [facility] pharmacy instead of [facility] pharmacy. Write this medication on your blue medication card. Take it every morning.     Thanks,     Kaylah

## 2025-07-01 NOTE — TELEPHONE ENCOUNTER
Spoke with patient confirmed appointment 07/02 @[address] [facility] 2nd floor Urology. Please arrive 15 min prior.

## 2025-07-02 ENCOUNTER — PROCEDURE VISIT (OUTPATIENT)
Dept: UROLOGY | Facility: CLINIC | Age: 30
End: 2025-07-02
Payer: MEDICARE

## 2025-07-02 ENCOUNTER — OFFICE VISIT (OUTPATIENT)
Dept: TRANSPLANT | Facility: CLINIC | Age: 30
End: 2025-07-02
Payer: MEDICARE

## 2025-07-02 ENCOUNTER — DOCUMENTATION ONLY (OUTPATIENT)
Dept: TRANSPLANT | Facility: CLINIC | Age: 30
End: 2025-07-02

## 2025-07-02 VITALS
SYSTOLIC BLOOD PRESSURE: 134 MMHG | WEIGHT: 151.44 LBS | TEMPERATURE: 98 F | HEART RATE: 89 BPM | DIASTOLIC BLOOD PRESSURE: 81 MMHG | BODY MASS INDEX: 20.51 KG/M2 | RESPIRATION RATE: 22 BRPM | HEIGHT: 72 IN | BODY MASS INDEX: 20.51 KG/M2 | WEIGHT: 151.44 LBS | OXYGEN SATURATION: 99 % | OXYGEN SATURATION: 99 % | HEART RATE: 89 BPM | TEMPERATURE: 98 F | RESPIRATION RATE: 22 BRPM | DIASTOLIC BLOOD PRESSURE: 81 MMHG | HEIGHT: 72 IN | SYSTOLIC BLOOD PRESSURE: 134 MMHG

## 2025-07-02 VITALS
SYSTOLIC BLOOD PRESSURE: 130 MMHG | BODY MASS INDEX: 20.59 KG/M2 | TEMPERATURE: 98 F | HEIGHT: 72 IN | RESPIRATION RATE: 18 BRPM | WEIGHT: 152 LBS | DIASTOLIC BLOOD PRESSURE: 77 MMHG | HEART RATE: 74 BPM

## 2025-07-02 DIAGNOSIS — N25.81 SECONDARY HYPERPARATHYROIDISM: ICD-10-CM

## 2025-07-02 DIAGNOSIS — T45.1X5A IMMUNODEFICIENCY DUE TO LONG TERM IMMUNOSUPPRESSIVE DRUG THERAPY: ICD-10-CM

## 2025-07-02 DIAGNOSIS — I12.9 BENIGN HYPERTENSION WITH CKD (CHRONIC KIDNEY DISEASE), STAGE II: ICD-10-CM

## 2025-07-02 DIAGNOSIS — D84.821 IMMUNODEFICIENCY DUE TO LONG TERM IMMUNOSUPPRESSIVE DRUG THERAPY: ICD-10-CM

## 2025-07-02 DIAGNOSIS — Z94.0 TRANSPLANTED KIDNEY: Primary | ICD-10-CM

## 2025-07-02 DIAGNOSIS — R74.01 TRANSAMINITIS: ICD-10-CM

## 2025-07-02 DIAGNOSIS — Z94.0 STATUS POST KIDNEY TRANSPLANT: ICD-10-CM

## 2025-07-02 DIAGNOSIS — Z94.0 KIDNEY REPLACED BY TRANSPLANT: ICD-10-CM

## 2025-07-02 DIAGNOSIS — N18.2 STAGE 2 CHRONIC KIDNEY DISEASE: ICD-10-CM

## 2025-07-02 DIAGNOSIS — Z79.60 IMMUNODEFICIENCY DUE TO LONG TERM IMMUNOSUPPRESSIVE DRUG THERAPY: ICD-10-CM

## 2025-07-02 DIAGNOSIS — Z94.0 IMMUNOSUPPRESSIVE MANAGEMENT ENCOUNTER FOLLOWING KIDNEY TRANSPLANT: ICD-10-CM

## 2025-07-02 DIAGNOSIS — Z79.60 LONG-TERM USE OF IMMUNOSUPPRESSANT MEDICATION: ICD-10-CM

## 2025-07-02 DIAGNOSIS — I10 HYPERTENSION: ICD-10-CM

## 2025-07-02 DIAGNOSIS — Z91.89 AT RISK FOR OPPORTUNISTIC INFECTIONS: ICD-10-CM

## 2025-07-02 DIAGNOSIS — K75.81 NASH (NONALCOHOLIC STEATOHEPATITIS): Chronic | ICD-10-CM

## 2025-07-02 DIAGNOSIS — Z94.0 KIDNEY REPLACED BY TRANSPLANT: Primary | ICD-10-CM

## 2025-07-02 DIAGNOSIS — Z79.899 IMMUNOSUPPRESSIVE MANAGEMENT ENCOUNTER FOLLOWING KIDNEY TRANSPLANT: ICD-10-CM

## 2025-07-02 DIAGNOSIS — Z51.81 ENCOUNTER FOR THERAPEUTIC DRUG MONITORING: ICD-10-CM

## 2025-07-02 DIAGNOSIS — N18.2 BENIGN HYPERTENSION WITH CKD (CHRONIC KIDNEY DISEASE), STAGE II: ICD-10-CM

## 2025-07-02 PROCEDURE — 3079F DIAST BP 80-89 MM HG: CPT | Mod: CPTII,S$GLB,, | Performed by: TRANSPLANT SURGERY

## 2025-07-02 PROCEDURE — 99999 PR PBB SHADOW E&M-EST. PATIENT-LVL III: CPT | Mod: PBBFAC,,,

## 2025-07-02 PROCEDURE — 1111F DSCHRG MED/CURRENT MED MERGE: CPT | Mod: CPTII,S$GLB,, | Performed by: TRANSPLANT SURGERY

## 2025-07-02 PROCEDURE — 99215 OFFICE O/P EST HI 40 MIN: CPT | Mod: 24,S$GLB,, | Performed by: TRANSPLANT SURGERY

## 2025-07-02 PROCEDURE — 3075F SYST BP GE 130 - 139MM HG: CPT | Mod: CPTII,S$GLB,, | Performed by: TRANSPLANT SURGERY

## 2025-07-02 PROCEDURE — 3044F HG A1C LEVEL LT 7.0%: CPT | Mod: CPTII,S$GLB,, | Performed by: TRANSPLANT SURGERY

## 2025-07-02 PROCEDURE — 99999 PR PBB SHADOW E&M-EST. PATIENT-LVL V: CPT | Mod: PBBFAC,,, | Performed by: NURSE PRACTITIONER

## 2025-07-02 PROCEDURE — 3008F BODY MASS INDEX DOCD: CPT | Mod: CPTII,S$GLB,, | Performed by: TRANSPLANT SURGERY

## 2025-07-02 PROCEDURE — 3066F NEPHROPATHY DOC TX: CPT | Mod: CPTII,S$GLB,, | Performed by: TRANSPLANT SURGERY

## 2025-07-02 RX ORDER — LABETALOL 300 MG/1
300 TABLET, FILM COATED ORAL 2 TIMES DAILY
Qty: 60 TABLET | Refills: 5 | Status: CANCELLED | OUTPATIENT
Start: 2025-07-02

## 2025-07-02 RX ORDER — HYDRALAZINE HYDROCHLORIDE 25 MG/1
25 TABLET, FILM COATED ORAL 2 TIMES DAILY PRN
Qty: 60 TABLET | Refills: 2 | Status: SHIPPED | OUTPATIENT
Start: 2025-07-02

## 2025-07-02 RX ORDER — LIDOCAINE HYDROCHLORIDE 20 MG/ML
JELLY TOPICAL ONCE
Status: COMPLETED | OUTPATIENT
Start: 2025-07-02 | End: 2025-07-02

## 2025-07-02 RX ADMIN — LIDOCAINE HYDROCHLORIDE 5 ML: 20 JELLY TOPICAL at 02:07

## 2025-07-02 NOTE — PATIENT INSTRUCTIONS
What to Expect After a Cystoscopy with Stent Removal  For the next 24-48 hours, you may feel a mild burning when you urinate. This burning is normal and expected. Drink plenty of water to dilute the urine to help relieve the burning sensation. You may also see a small amount of blood in your urine after the procedure.    Unless you are already taking antibiotics, you may be given an antibiotic after the test to prevent infection.    Signs and Symptoms to Report  Call the [facility] at [phone] if you develop any of the following:  Fever of 101 degrees or higher  Chills or persistent bleeding  Inability to urinate    After hours or on weekends, you may reach a urology resident on call at this number: [phone].

## 2025-07-02 NOTE — PROGRESS NOTES
Transplant Surgery  Kidney Transplant Recipient Follow-up    Referring Physician: Raj Florez  Current Nephrologist: Raj Florez    Subjective:     Chief Complaint: Jeffrey Moreno is a 30 y.o. year old Black or  male who is status post Kidney transplant performed on 6/9/2025.    ORGAN: LEFT KIDNEY    Disease Etiology: Hypertensive Nephrosclerosis  Donor Type: Donation after Brain Death    Donor CMV Status: Negative    Donor HBcAB: Negative    Donor HCV Status: Negative      History of Present Illness: He reports no concerns.  From a transplant perspective, he reports normal urination.  Jeffrey is here for management of his immunosuppression medication.  Jeffrey states that his immunosuppression is being well tolerated.  Hypertension is not present.    External provider notes reviewed: Yes    Review of Systems    Objective:     Physical Exam  Constitutional:       Appearance: He is well-developed.   HENT:      Head: Normocephalic and atraumatic.   Eyes:      Pupils: Pupils are equal, round, and reactive to light.   Neck:      Vascular: No JVD.   Cardiovascular:      Rate and Rhythm: Normal rate and regular rhythm.      Heart sounds: Normal heart sounds.   Pulmonary:      Effort: Pulmonary effort is normal.      Breath sounds: Normal breath sounds. No stridor.   Abdominal:      General: There is no distension.      Palpations: Abdomen is soft.      Tenderness: There is no abdominal tenderness.      Comments: Incisions c/d/i   Musculoskeletal:         General: Normal range of motion.      Right lower leg: No edema.      Left lower leg: No edema.   Skin:     General: Skin is warm and dry.   Neurological:      Mental Status: He is alert and oriented to person, place, and time.   Psychiatric:         Behavior: Behavior normal.       Lab Results   Component Value Date    CREATININE 1.15 06/30/2025    CREATININE 1.3 06/19/2025    BUN 15 06/30/2025    BUN 14 06/19/2025     Lab Results   Component Value  Date    WBC 5.30 06/19/2025    HGB 12.9 (L) 06/19/2025    HGB 10.1 (L) 08/20/2024    HCT 41.9 06/19/2025    HCT 34 (L) 06/14/2025     06/19/2025     08/20/2024     Lab Results   Component Value Date    TACROLIMUS 8.5 06/19/2025       Diagnostics:  The following labs have been reviewed: CBC  CMP, tacro  INR  The following radiology images have been independently reviewed and interpreted: Renal US    Assessment and Plan:        S/P Kidney transplant.  Chronic immunosuppressive medications for rejection prophylaxis at target.  Plan: no adjustment needed.  Continue monitoring symptoms, labs and drug levels for drug-related toxicity and side effects.  Renal hypertension at target.    Additional testing to be completed according to the Kidney: Written Order Guideline for Kidney Transplant Follow-Up (KI-09)    Interpretation of tests and discussion of patient management involves all members of the multidisciplinary transplant team.  Patient advised that it is recommended that all transplant candidates, and their close contacts and household members receive Covid vaccination.  Follow-up: Patient reminded to call with any health changes, since these can be early signs of significant complications.  Also, I advised the patient to be sure any new medications or changes of old medications are discussed with either a pharmacist, or physician knowledgeable with transplant to avoid rejection/drug toxicity related to significant drug interactions.    MD GIA Su Jr Patient Status  Functional Status: 90% - Able to carry on normal activity: minor symptoms of disease  Physical Capacity: No Limitations

## 2025-07-02 NOTE — LETTER
July 2, 2025        Raj Florez  [address] [place] [address]  Phone: [phone]  Fax: [phone]             [facility]  [address]  [facility] [address]  Phone: [phone]   Patient: Jeffrey Moreno   MR Number: [id number]   YOB: 1995   Date of Visit: 7/2/2025       Dear Dr. Raj Florez    Thank you for referring Jeffrey Moreno to me for evaluation. Attached you will find relevant portions of my assessment and plan of care.    If you have questions, please do not hesitate to call me. I look forward to following Jeffrey Moreno along with you.    Sincerely,    Elisa Amaro, NP    Enclosure    If you would like to receive this communication electronically, please contact [email or web address] or [phone] to request [employer] access.    [employer] is a tool which provides read-only access to select patient information with whom you have a relationship. Its easy to use and provides real time access to review your patients record including encounter summaries, notes, results, and demographic information.    If you feel you have received this communication in error or would no longer like to receive these types of communications, please e-mail [email or web address]

## 2025-07-02 NOTE — PROCEDURES
Procedure: Flexible cysto-uretheroscopy and stent removal   Pre Procedure Diagnosis:s/p kidney transplant   Post Procedure Diagnosis:same   Surgeon: Rob Smith MD   Anesthesia: 2% uro-jet lidocaine jelly for local analgesia   Flexible cysto-urethroscopy was performed after consent was obtained. The risks and benefits were explained.   2% lidocaine urojet was used for local analgesia.   The genitalia was prepped and draped in the sterile fashion with betadine.   The flexible scope was advanced into the urethra and into the bladder. The stent was removed without difficulty.   The patient tolerated the procedure well without complication.   They will follow up with transplant.

## 2025-07-03 ENCOUNTER — TELEPHONE (OUTPATIENT)
Dept: TRANSPLANT | Facility: CLINIC | Age: 30
End: 2025-07-03
Payer: MEDICARE

## 2025-07-03 ENCOUNTER — PATIENT MESSAGE (OUTPATIENT)
Dept: TRANSPLANT | Facility: CLINIC | Age: 30
End: 2025-07-03
Payer: MEDICARE

## 2025-07-03 ENCOUNTER — DOCUMENTATION ONLY (OUTPATIENT)
Dept: TRANSPLANT | Facility: CLINIC | Age: 30
End: 2025-07-03
Payer: MEDICARE

## 2025-07-03 ENCOUNTER — RESULTS FOLLOW-UP (OUTPATIENT)
Dept: TRANSPLANT | Facility: CLINIC | Age: 30
End: 2025-07-03
Payer: MEDICARE

## 2025-07-03 DIAGNOSIS — E83.39 HYPOPHOSPHATEMIA: ICD-10-CM

## 2025-07-03 DIAGNOSIS — Z94.0 KIDNEY REPLACED BY TRANSPLANT: ICD-10-CM

## 2025-07-03 LAB
EXT ALBUMIN: 4.3 (ref 3.5–5.7)
EXT ALKALINE PHOSPHATASE: ABNORMAL
EXT ALLOSURE: ABNORMAL
EXT ALT: ABNORMAL
EXT AMYLASE: ABNORMAL
EXT ANC: ABNORMAL
EXT AST: ABNORMAL
EXT BACTERIA UA: ABNORMAL
EXT BANDS%: ABNORMAL
EXT BILIRUBIN DIRECT: ABNORMAL
EXT BILIRUBIN TOTAL: ABNORMAL
EXT BK VIRUS DNA QN PCR: ABNORMAL
EXT BUN: 12 (ref 7–25)
EXT C PEPTIDE: ABNORMAL
EXT CALCIUM: 10 (ref 8.6–10.3)
EXT CHLORIDE: 103 (ref 98–107)
EXT CHOLESTEROL: ABNORMAL
EXT CMV DNA QUANT. BY PCR: ABNORMAL
EXT CO2: 28 (ref 21–31)
EXT CREATININE UA: ABNORMAL
EXT CREATININE: 1.05 MG/DL (ref 0.6–1.3)
EXT CYCLOSPORINE LVL: ABNORMAL
EXT EBV DNA BY PCR: ABNORMAL
EXT EBV IGG: ABNORMAL
EXT EGFR NO RACE VARIABLE: 99
EXT EOSINOPHIL%: 11 (ref 0–7)
EXT FERRITIN: ABNORMAL
EXT GFR MDRD AF AMER: ABNORMAL
EXT GFR MDRD NON AF AMER: ABNORMAL
EXT GLUCOSE UA: ABNORMAL
EXT GLUCOSE: 95 (ref 74–109)
EXT HBV DNA QUANT PCR: ABNORMAL
EXT HCV QUANT: ABNORMAL
EXT HDL: ABNORMAL
EXT HEMATOCRIT: 336.7 (ref 42–52)
EXT HEMOGLOBIN A1C: ABNORMAL
EXT HEMOGLOBIN: 12.9 (ref 14–18)
EXT HIV RNA QUANT PCR: ABNORMAL
EXT IMMUNKNOW (STIMULATED): ABNORMAL
EXT IRON SATURATION: ABNORMAL
EXT LDH, TOTAL: ABNORMAL
EXT LDL CHOLESTEROL: ABNORMAL
EXT LEFLUNOMIDE METABOLITE: ABNORMAL
EXT LIPASE: ABNORMAL
EXT LYMPH%: 18 (ref 21–47)
EXT MAGNESIUM: 1.6 (ref 1.9–2.7)
EXT MONOCYTES%: 7 (ref 2–11)
EXT NITRITES UA: ABNORMAL
EXT PHOSPHORUS: 1.9 (ref 2.5–5)
EXT PLATELETS: 326 (ref 150–375)
EXT POTASSIUM: 3.5 (ref 3.5–5.1)
EXT PROT/CREAT RATIO UR: ABNORMAL
EXT PROTEIN TOTAL: ABNORMAL
EXT PROTEIN UA: ABNORMAL
EXT PTH, INTACT: ABNORMAL
EXT RBC UA: ABNORMAL
EXT SEGS%: 63 (ref 44–81)
EXT SERUM IRON: ABNORMAL
EXT SIROLIMUS LVL: ABNORMAL
EXT SODIUM: 137 MMOL/L (ref 136–145)
EXT TACROLIMUS LVL: ABNORMAL
EXT TIBC: ABNORMAL
EXT TRIGLYCERIDES: ABNORMAL
EXT URIC ACID: ABNORMAL
EXT URINE CULTURE: ABNORMAL
EXT URINE PROTEIN: ABNORMAL
EXT VIT D 25 HYDROXY: ABNORMAL
EXT WBC UA: ABNORMAL
EXT WBC: 4.1 (ref 4–11)
PARVOVIRUS B19 DNA BY PCR: ABNORMAL
QUANTIFERON GOLD TB: ABNORMAL

## 2025-07-03 RX ORDER — KETOCONAZOLE 200 MG/1
100 TABLET ORAL DAILY
Qty: 45 TABLET | Refills: 3 | Status: SHIPPED | OUTPATIENT
Start: 2025-07-03

## 2025-07-03 RX ORDER — CALCITRIOL 0.25 UG/1
0.25 CAPSULE ORAL DAILY
Qty: 90 CAPSULE | Refills: 3 | Status: SHIPPED | OUTPATIENT
Start: 2025-07-03 | End: 2026-07-03

## 2025-07-03 RX ORDER — KETOCONAZOLE 200 MG/1
100 TABLET ORAL DAILY
Qty: 45 TABLET | Refills: 3 | Status: CANCELLED | OUTPATIENT
Start: 2025-07-03

## 2025-07-03 RX ORDER — CALCITRIOL 0.25 UG/1
0.25 CAPSULE ORAL DAILY
Qty: 90 CAPSULE | Refills: 3 | Status: CANCELLED | OUTPATIENT
Start: 2025-07-03

## 2025-07-03 NOTE — TELEPHONE ENCOUNTER
Follow-up call place to patient to inform him of the prograf dose change. Instructed patient to take prograf 10mg twice daily and start ketoconazole 100mg daily once he picks up the prescription from [facility]. Instructed patient to update his blue card with the dose change. Patient verbalized understanding.     I'm ok with 10/10   ----- Message from Gelacio Tyler MD sent at 7/3/2025 12:10 PM CDT -----  Since patient cannot get keto right now, please increase dose from 10/10 to 16/16 and repeat at the end of the week.  Adding pharmacy to see if they think I should use a higher dose  ----- Message -----  From: Kaylah Campos RN  Sent: 7/3/2025  12:02 PM CDT  To: Gelacio Tyler Jr., MD

## 2025-07-03 NOTE — PROGRESS NOTES
Contacted patient to verify if he started taking ketoconazole. Patient reports he hasn't received it from his local [facility] pharmacy yet due to insurance issues.     Contacted [facility] to inquire about filling the patient's ketoconazole prescription. Spoke with pharmacist who says they are waiting on a PA. If the patient has to pay cash price for the medication it will cost him $49.90.     Informed patient the prescription will be sent to [facility] to fill. Patient agrees to pick it up tomorrow when he picks up his other medications.     Instructed patient to tell me what his bottle actually says. Patient reports he's actually taking 7mg twice daily instead of 10mg twice daily. On 6/12/25 patient reports he didn't make the dose change on his blue card so he has been going by the dose on the bottle of prograf he has. Informed patient his dose will be reviewed with Dr. Tyler but for now take extra 3 mg to give a dose of 10mg. Will call him with further instructions if Dr. Tyler decides to increase his dose. Patient verbalized understanding.

## 2025-07-08 ENCOUNTER — DOCUMENTATION ONLY (OUTPATIENT)
Dept: TRANSPLANT | Facility: CLINIC | Age: 30
End: 2025-07-08
Payer: MEDICARE

## 2025-07-08 LAB
EXT ALBUMIN: 4.5 (ref 3.5–5.7)
EXT BUN: 19 (ref 7–25)
EXT CALCIUM: 10 (ref 8.6–10.3)
EXT CHLORIDE: 104 (ref 98–107)
EXT CO2: 30 (ref 21–31)
EXT CREATININE: 1.02 MG/DL (ref 0.6–1.3)
EXT EGFR NO RACE VARIABLE: 102
EXT EOSINOPHIL%: 8 (ref 0–7)
EXT GLUCOSE: 92 (ref 74–109)
EXT HEMATOCRIT: 39.3 (ref 42–52)
EXT HEMOGLOBIN: 13.6 (ref 14–18)
EXT LYMPH%: 16 (ref 21–47)
EXT MAGNESIUM: 1.3 (ref 1.9–2.7)
EXT MONOCYTES%: 12 (ref 2–11)
EXT PHOSPHORUS: 1.7 (ref 2.5–5)
EXT PLATELETS: 245 (ref 150–375)
EXT POTASSIUM: 3.4 (ref 3.5–5.1)
EXT SEGS%: 60 (ref 44–81)
EXT SODIUM: 140 MMOL/L (ref 136–145)
EXT TACROLIMUS LVL: 7.4
EXT WBC: 2.6 (ref 4–11)

## 2025-07-10 ENCOUNTER — PATIENT MESSAGE (OUTPATIENT)
Dept: SURGERY | Facility: HOSPITAL | Age: 30
End: 2025-07-10
Payer: MEDICARE

## 2025-07-10 ENCOUNTER — TELEPHONE (OUTPATIENT)
Dept: HEPATOLOGY | Facility: CLINIC | Age: 30
End: 2025-07-10
Payer: MEDICARE

## 2025-07-10 ENCOUNTER — DOCUMENTATION ONLY (OUTPATIENT)
Dept: TRANSPLANT | Facility: CLINIC | Age: 30
End: 2025-07-10
Payer: MEDICARE

## 2025-07-10 DIAGNOSIS — B25.9 CYTOMEGALOVIRUS INFECTION, UNSPECIFIED CYTOMEGALOVIRAL INFECTION TYPE: Primary | ICD-10-CM

## 2025-07-10 LAB
EXT ALBUMIN: 4.4 (ref 3.5–5.7)
EXT ALKALINE PHOSPHATASE: ABNORMAL
EXT ALLOSURE: ABNORMAL
EXT ALT: ABNORMAL
EXT AMYLASE: ABNORMAL
EXT ANC: ABNORMAL
EXT AST: ABNORMAL
EXT BACTERIA UA: ABNORMAL
EXT BANDS%: ABNORMAL
EXT BILIRUBIN DIRECT: ABNORMAL
EXT BILIRUBIN TOTAL: ABNORMAL
EXT BK VIRUS DNA QN PCR: ABNORMAL
EXT BUN: 14 (ref 7–25)
EXT C PEPTIDE: ABNORMAL
EXT CALCIUM: 9.8 (ref 8.6–10.3)
EXT CHLORIDE: 103 (ref 98–107)
EXT CHOLESTEROL: ABNORMAL
EXT CMV DNA QUANT. BY PCR: ABNORMAL
EXT CO2: 29 (ref 21–31)
EXT CREATININE UA: ABNORMAL
EXT CREATININE: 1.13 MG/DL (ref 0.6–1.3)
EXT CYCLOSPORINE LVL: ABNORMAL
EXT EBV DNA BY PCR: ABNORMAL
EXT EBV IGG: ABNORMAL
EXT EGFR NO RACE VARIABLE: 90
EXT EOSINOPHIL%: ABNORMAL
EXT FERRITIN: ABNORMAL
EXT GFR MDRD AF AMER: ABNORMAL
EXT GFR MDRD NON AF AMER: ABNORMAL
EXT GLUCOSE UA: ABNORMAL
EXT GLUCOSE: 93 (ref 74–109)
EXT HBV DNA QUANT PCR: ABNORMAL
EXT HCV QUANT: ABNORMAL
EXT HDL: ABNORMAL
EXT HEMATOCRIT: 38.4 (ref 42–52)
EXT HEMOGLOBIN A1C: ABNORMAL
EXT HEMOGLOBIN: 13.5 (ref 14–18)
EXT HIV RNA QUANT PCR: ABNORMAL
EXT IMMUNKNOW (STIMULATED): ABNORMAL
EXT IRON SATURATION: ABNORMAL
EXT LDH, TOTAL: ABNORMAL
EXT LDL CHOLESTEROL: ABNORMAL
EXT LEFLUNOMIDE METABOLITE: ABNORMAL
EXT LIPASE: ABNORMAL
EXT LYMPH%: 26 (ref 21–47)
EXT MAGNESIUM: 1.3 (ref 1.9–2.7)
EXT MONOCYTES%: 10 (ref 2–11)
EXT NITRITES UA: ABNORMAL
EXT PHOSPHORUS: 1.9 (ref 2.5–5)
EXT PLATELETS: 189 (ref 150–375)
EXT POTASSIUM: 3.6 (ref 3.5–5.1)
EXT PROT/CREAT RATIO UR: ABNORMAL
EXT PROTEIN TOTAL: ABNORMAL
EXT PROTEIN UA: ABNORMAL
EXT PTH, INTACT: ABNORMAL
EXT RBC UA: ABNORMAL
EXT SEGS%: 62 (ref 44–81)
EXT SERUM IRON: ABNORMAL
EXT SIROLIMUS LVL: ABNORMAL
EXT SODIUM: 139 MMOL/L (ref 136–145)
EXT TACROLIMUS LVL: ABNORMAL
EXT TIBC: ABNORMAL
EXT TRIGLYCERIDES: ABNORMAL
EXT URIC ACID: ABNORMAL
EXT URINE CULTURE: ABNORMAL
EXT URINE PROTEIN: ABNORMAL
EXT VIT D 25 HYDROXY: ABNORMAL
EXT WBC UA: ABNORMAL
EXT WBC: 1.8 (ref 4–11)
PARVOVIRUS B19 DNA BY PCR: ABNORMAL
QUANTIFERON GOLD TB: ABNORMAL

## 2025-07-10 NOTE — TELEPHONE ENCOUNTER
Returned call to patient. Rescheduled sooner appointment per patient request. Appointment date 7/11.

## 2025-07-10 NOTE — TELEPHONE ENCOUNTER
Copied from CRM #[id number]. Topic: Appointments - Appointment Rescheduling  >> Jul 10, 2025 10:54 AM Adalgisa wrote:  Type:  Sooner Appointment Request    Caller is requesting a sooner appointment.  Caller declined first available appointment listed below.  Caller will not accept being placed on the waitlist and is requesting a message be sent to doctor.    Name of Caller:  [facility]  When is the first available appointment?  08/19-scheduled  Symptoms:  Elevated liver enzymes  Would the patient rather a call back or a response via [facility]? Call  Best Call Back Number:  [phone]  Additional Information:  Maryjane was calling to see if we could get him in sooner, can we please call pt back to advise. Thank You

## 2025-07-11 ENCOUNTER — TELEPHONE (OUTPATIENT)
Dept: HEPATOLOGY | Facility: CLINIC | Age: 30
End: 2025-07-11

## 2025-07-11 ENCOUNTER — OFFICE VISIT (OUTPATIENT)
Dept: HEPATOLOGY | Facility: CLINIC | Age: 30
End: 2025-07-11
Payer: MEDICARE

## 2025-07-11 ENCOUNTER — LAB VISIT (OUTPATIENT)
Dept: LAB | Facility: HOSPITAL | Age: 30
End: 2025-07-11
Payer: MEDICARE

## 2025-07-11 VITALS — HEIGHT: 72 IN | WEIGHT: 156.5 LBS | BODY MASS INDEX: 21.2 KG/M2

## 2025-07-11 DIAGNOSIS — R74.8 ELEVATED LIVER ENZYMES: ICD-10-CM

## 2025-07-11 DIAGNOSIS — R74.8 ELEVATED LIVER ENZYMES: Primary | ICD-10-CM

## 2025-07-11 DIAGNOSIS — Z94.0 S/P KIDNEY TRANSPLANT: ICD-10-CM

## 2025-07-11 DIAGNOSIS — R93.2 ABNORMAL FINDINGS ON DIAGNOSTIC IMAGING OF LIVER: ICD-10-CM

## 2025-07-11 PROBLEM — N35.919 URETHRAL STRICTURE: Status: ACTIVE | Noted: 2025-04-21

## 2025-07-11 LAB
ALBUMIN SERPL BCP-MCNC: 4.3 G/DL (ref 3.5–5.2)
ALP SERPL-CCNC: 191 UNIT/L (ref 40–150)
ALT SERPL W/O P-5'-P-CCNC: 100 UNIT/L (ref 10–44)
AST SERPL-CCNC: 51 UNIT/L (ref 11–45)
BILIRUB DIRECT SERPL-MCNC: 0.1 MG/DL (ref 0.1–0.3)
BILIRUB SERPL-MCNC: 0.3 MG/DL (ref 0.1–1)
PROT SERPL-MCNC: 7.7 GM/DL (ref 6–8.4)

## 2025-07-11 PROCEDURE — 36415 COLL VENOUS BLD VENIPUNCTURE: CPT

## 2025-07-11 PROCEDURE — 99999 PR PBB SHADOW E&M-EST. PATIENT-LVL III: CPT | Mod: PBBFAC,,, | Performed by: NURSE PRACTITIONER

## 2025-07-11 PROCEDURE — 80076 HEPATIC FUNCTION PANEL: CPT

## 2025-07-11 NOTE — TELEPHONE ENCOUNTER
Call placed to the Patient at [phone].  Message relayed from Soniya Kelley NP.    Repeat Labs (hepatic panel) in 2 weeks scheduled in [place] per request of the Patient.  Labs scheduled for 7/25/25 at 10:25 am at [facility].  Pt verbalized understanding.  Reminder placed in the mail.

## 2025-07-11 NOTE — TELEPHONE ENCOUNTER
----- Message from Soniya Kelley NP sent at 7/11/2025  3:32 PM CDT -----  Please call patient and let him know that overall his liver enzymes are improving, which is reassuring. Additionally, synthetic function remains intact, which indicates that the liver is still   working well.   I recommend follow up labs in 2 weeks. Since it has been difficult to get lab results from his local lab in [place], I recommend that he have labs drawn in [place]. Lab order in [facility]. Thanks!  ----- Message -----  From: Lab, Background User  Sent: 7/11/2025   3:01 PM CDT  To: Soniya Kelley NP

## 2025-07-11 NOTE — PROGRESS NOTES
"[facility] Established Patient Visit    Reason for Visit: Elevated liver enzymes    PCP: Ellyn, Primary Doctor    HPI:  This is a 30 y.o. male with PMH noted below, here for elevated liver enzymes. He was last seen in clinic by myself in 4/2025. PMH is significant for ESRD, S/P kidney transplant on 6/9/2025.     The patient's risk factors for fatty liver disease include:     Overweight/Obesity                     No; BMI 21.23  Dyslipidemia                                No; Refer to most recent lipid panel results below:   Latest Reference Range & Units 08/20/24 07:43   Cholesterol Total 120 - 199 mg/dL 161   HDL 40 - 75 mg/dL 42   HDL/Cholesterol Ratio 20.0 - 50.0 % 26.1   Non-HDL Cholesterol mg/dL 119   Total Cholesterol/HDL Ratio 2.0 - 5.0  3.8   Triglycerides 30 - 150 mg/dL 141   LDL Cholesterol 63.0 - 159.0 mg/dL 90.8     Insulin resistance/Diabetes         No; Last HgbA1c was 4.4% (1/2025)    He has had intermittently elevated liver enzymes in a hepatocellular pattern since at least 12/2023, based on review of outside records.    Abdominal US in 10/2024 showed a small volume perihepatic free fluid. Prior abdominal US in 5/2024 showed "mildly increased hepatic echogenicity and coarsened echotexture as seen with diffuse hepatic steatosis and/or chronic liver disease. No evidence of advanced cirrhosis. No focal liver lesions."    Prior abdominal CT with contrast in 4/2024 showed a normal appearing liver and spleen. Platelet counts are normal.     A Fibroscan was done to non-invasively stage his liver disease at an outside facility in [place] in 12/2024 that did not suggest any significant hepatic fat or fibrosis. Follow up Fibroscan was performed at [facility] in 4/2025 which showed similar findings.    He has never undergone a liver biopsy.    FIB-4 Calculation: 0.67 at 8/20/2024  7:43 AM  Calculated from:  SGOT/AST: 32 U/L at 8/20/2024  7:43 AM  SGPT/ALT: 41 U/L at 8/20/2024  7:43 AM  Platelets: 209 K/uL " at 8/20/2024  7:43 AM  Age: 29 years     FIB-4 below 1.30 is considered as low-risk for advanced fibrosis  FIB-4 over 2.67 is considered as high-risk for advanced fibrosis  FIB-4 values between 1.30 and 2.67 are considered as intermediate-risk of advanced fibrosis for ages 36-64.     For ages > 64 the cut-off for low-risk goes to < 2.  This is a screening tool and clinical judgement should be used in the interpretation of these results.    Family history is significant for Mother with a history of fatty liver disease. He denies any history of heavy alcohol intake and does not use illicit drugs. HBV, HCV and HIV negative on prior labs. He is immune to Hepatitis B, through prior vaccination. He is S/P vaccination for HAV.     Serological work up was done per local GI provider to rule out other causes of chronic liver disease, and was negative for Alpha-1 antitrypsin deficiency, autoimmune etiology and iron overload. Of note, Ceruloplasmin was mildly low (17.3), but 24 hour urine for copper was normal. Repeat Ceruloplasmin level earlier this year at [facility] was normal.     He experienced an acute rise in liver enzymes post transplant, as below:     Latest Reference Range & Units 06/14/25 23:23 06/15/25 05:22   AST 11 - 45 unit/L 176 (H) 156 (H)   ALT 10 - 44 unit/L 267 (H) 287 (H)     LFT's prior to surgery were normal. Abdominal Ultrasound with doppler studies showed GB stones and biliary sludge, as well as a mildly enlarged spleen (12.7 cm), as below:    FINDINGS:    Liver: Upper limit of normal in size, measuring 18.1 cm. Homogeneous parenchymal echotexture. No focal hepatic lesions.     Hepatic vasculature:Portal veins are patent with proper directional flow. The splenic and superior mesenteric veins are patent at the portal confluence. Hepatic veins and IVC are patent with proper directional flow. Hepatic arteries are patent with normal waveforms. No upper abdominal venous collaterals.     Biliary: Gallbladder  wall is thickened with hypervascularity.  Patient is markedly tender in the right upper quadrant on exam. There is biliary sludge and small stones noted.  No biliary ductal dilatation. CBD measures 3 mm.     Spleen: Borderline enlarged, measuring 12.7 x 3.3 cm. Homogeneous parenchymal echotexture.     Pancreas: Visualized portions demonstrate normal contours.     Aorta: No aneurysm.     Miscellaneous: Small volume free fluid in the upper abdomen, possibly postoperative related to kidney transplant 06/09/2025.  Question splenic collateral vessels.     Impression:     Biliary sludge and small stones in the gallbladder with wall thickening (wall thickening possibly related to free abdominal fluid) and hypervascularity.  Recommend correlation with physical exam.  Consider further evaluation with HIDA scan as clinically indicated to exclude the possibility of acute cholecystitis.     Small volume free fluid in the upper abdomen, likely related to recent surgery.     Spleen is borderline enlarged with questioned small collateral vessels at the hilum, nonspecific, but could suggest underlying chronic hepatic dysfunction.    Prior non-contrast abdominal CT in June and abdominal US in April showed no gallstones or biliary sludge. Additionally, the spleen measured 11.7 cm on US in April.      He is well appearing, and has no signs or symptoms of hepatic decompensation including jaundice, dark urine, pruritus, abdominal distention, lower extremity edema, hematemesis, melena, or periods of confusion suggestive of hepatic encephalopathy.      PMHX:  has a past medical history of Anemia, Disorder of kidney and ureter, Enteritis, Hypertension, LVH (left ventricular hypertrophy), ZAVALETA (nonalcoholic steatohepatitis), Prostatitis, and Secondary hyperparathyroidism.    PSHX:  has a past surgical history that includes Insertion of dialysis catheter; Peritoneal catheter insertion; Kidney transplant (N/A, 6/9/2025); and Peritoneal  catheter removal (N/A, 6/9/2025).    The patient's social and family histories were reviewed by me and updated in the appropriate section of the electronic medical record.    Review of patient's allergies indicates:  No Known Allergies    Medications Ordered Prior to Encounter[1]    SOCIAL HISTORY:   Tobacco Use History[2]    Social History     Substance and Sexual Activity   Alcohol Use Not Currently     Social History     Substance and Sexual Activity   Drug Use Never     ROS:   GENERAL: Denies fever, chills, weight loss/gain, fatigue + mid back pain - onset 2 days ago - described as aching.  HEENT: Denies headaches, dizziness, vision/hearing changes  CARDIOVASCULAR: Denies chest pain, palpitations, or edema  RESPIRATORY: Denies dyspnea, cough  GI: Denies abdominal pain, rectal bleeding, nausea, vomiting. No change in bowel pattern or color  : Denies dysuria, hematuria   SKIN: Denies rash, itching   NEURO: Denies confusion, memory loss, or mood changes  PSYCH: Denies depression or anxiety  HEME/LYMPH: Denies easy bruising or bleeding    Objective Findings:    PHYSICAL EXAM:   Friendly Black or  male, in no acute distress; alert and oriented to person, place and time  VITALS: Ht 6' (1.829 m)   Wt 71 kg (156 lb 8.4 oz)   BMI 21.23 kg/m²   HENT: Normocephalic, without obvious abnormality.   EYES: Sclerae anicteric.   NECK: No obvious masses.  CARDIOVASCULAR: No peripheral edema.  RESPIRATORY: Normal respiratory effort.   GI: Non-distended abdomen.  EXTREMITIES:  No clubbing, cyanosis or edema.  SKIN: Warm and dry. No jaundice. No rashes noted to exposed skin.   NEURO: No asterixis.  PSYCH:  Memory intact. Thought and speech pattern appropriate. Behavior normal. No depression or anxiety noted.    DIAGNOSTIC STUDIES: Reviewed in Epic.     FIBROSCAN 4/3/2025:      Findings  Median liver stiffness score:  5.7  CAP Reading: dB/m:  189     IQR/med %:  8  Interpretation  Fibrosis interpretation is based  on medial liver stiffness - Kilopascal (kPa).     Fibrosis Stage:  F 0-1  Steatosis interpretation is based on controlled attenuation parameter - (dB/m).     Steatosis Grade:  <S1     ASSESSMENT & PLAN:  30 y.o. Black or  male with:    1. Elevated liver enzymes  Hepatic Function Panel      2. S/P kidney transplant  Hepatic Function Panel        - Repeat liver function tests today.  - Maintain a healthy weight, through diet and exercise.  - Recommend good control of cholesterol, blood pressure, & blood sugar levels.  - Avoid herbal supplements/alternative remedies.  - Limit alcohol use to no more than 5-7 standard drinks per week, ideally less.  - Continue follow up with Kidney transplant team, as planned.  - Return to clinic TBD by lab results.     Thank you for allowing me to participate in the care of Jeffrey Moreno Jr.       Hepatology Nurse Practitioner  [facility]    CC'ed note to:   No ref. provider found  No, Primary Doctor           [1]   Current Outpatient Medications on File Prior to Visit   Medication Sig Dispense Refill    calcitRIOL (ROCALTROL) 0.25 MCG Cap Take 1 capsule (0.25 mcg total) by mouth once daily. 90 capsule 3    carvediloL (COREG) 6.25 MG tablet Take 2 tablets (12.5 mg total) by mouth 2 (two) times daily. Hold for SBP <120 360 tablet 3    cholecalciferol, vitamin D3, (VITAMIN D3) 50 mcg (2,000 unit) Tab Take 1 tablet (2,000 Units total) by mouth once daily. 30 tablet 6    gabapentin (NEURONTIN) 100 MG capsule Take 1 capsule (100 mg total) by mouth 3 (three) times daily. 270 capsule 3    hydrALAZINE (APRESOLINE) 25 MG tablet Take 1 tablet (25 mg total) by mouth 2 (two) times daily as needed (BP > 180/90 and call coordinator). 60 tablet 2    k phos di & mono-sod phos mono (K-PHOS-NEUTRAL) 250 mg Tab Take 2 tablets by mouth every 12 (twelve) hours. 360 tablet 3    ketoconazole (NIZORAL) 200 mg Tab Take HALF tablet (100 mg total) by  mouth once daily. 45 tablet 3    magnesium oxide (MAG-OX) 400 mg (241.3 mg magnesium) tablet Take 1 tablet (400 mg total) by mouth 2 (two) times daily. 60 tablet 11    multivitamin Tab Take 1 tablet by mouth once daily. 30 tablet 5    mycophenolate (CELLCEPT) 250 mg Cap Take 4 capsules (1,000 mg total) by mouth 2 (two) times daily. 240 capsule 11    NIFEdipine (PROCARDIA-XL) 60 MG (OSM) 24 hr tablet Take 1 tablet (60 mg total) by mouth 2 (two) times a day. 60 tablet 11    oxybutynin (DITROPAN) 5 MG Tab Take 1 tablet (5 mg total) by mouth 3 (three) times daily as needed (Bladder spasms). 60 tablet 1    oxyCODONE (ROXICODONE) 10 mg Tab immediate release tablet Take 1 tablet (10 mg total) by mouth every 4 (four) hours as needed for Pain. 30 tablet 0    predniSONE (DELTASONE) 5 MG tablet Take 20mg by mouth once daily from 6/11/25-6/17;  Take 15mg daily from 6/18-6/24;  Take 10mg daily from  6/25-7/1/25;  Take 5mg daily thereafter starting 7/2/25. 75 tablet 11    sulfamethoxazole-trimethoprim 400-80mg (BACTRIM,SEPTRA) 400-80 mg per tablet Take 1 tablet by mouth every morning. Stop 12/6/25 30 tablet 5    tacrolimus (PROGRAF) 1 MG Cap Take 10 capsules (10 mg total) by mouth every 12 (twelve) hours. Z94.0;Kidney txp on 6/9/25 600 capsule 11    valGANciclovir (VALCYTE) 450 mg Tab Take 1 tablet (450 mg total) by mouth once daily. Stop 9/7/25 30 tablet 2     No current facility-administered medications on file prior to visit.   [2]   Social History  Tobacco Use   Smoking Status Never   Smokeless Tobacco Never

## 2025-07-14 ENCOUNTER — DOCUMENTATION ONLY (OUTPATIENT)
Dept: TRANSPLANT | Facility: CLINIC | Age: 30
End: 2025-07-14
Payer: MEDICARE

## 2025-07-15 ENCOUNTER — RESULTS FOLLOW-UP (OUTPATIENT)
Dept: TRANSPLANT | Facility: HOSPITAL | Age: 30
End: 2025-07-15
Payer: MEDICARE

## 2025-07-15 ENCOUNTER — DOCUMENTATION ONLY (OUTPATIENT)
Dept: TRANSPLANT | Facility: CLINIC | Age: 30
End: 2025-07-15
Payer: MEDICARE

## 2025-07-15 DIAGNOSIS — E83.39 HYPOPHOSPHATEMIA: ICD-10-CM

## 2025-07-15 DIAGNOSIS — Z94.9 HYPERTENSION ASSOCIATED WITH TRANSPLANTATION: Primary | ICD-10-CM

## 2025-07-15 DIAGNOSIS — E83.42 HYPOMAGNESEMIA: ICD-10-CM

## 2025-07-15 DIAGNOSIS — Z94.0 KIDNEY REPLACED BY TRANSPLANT: Primary | ICD-10-CM

## 2025-07-15 DIAGNOSIS — I15.8 HYPERTENSION ASSOCIATED WITH TRANSPLANTATION: Primary | ICD-10-CM

## 2025-07-15 LAB
EXT ALBUMIN: 4.5 (ref 3.5–5.7)
EXT BUN: 14 (ref 7–25)
EXT CALCIUM: 9.7 (ref 8.6–10.3)
EXT CHLORIDE: 105 (ref 98–107)
EXT CO2: 29 (ref 21–31)
EXT CREATININE: 1.1 MG/DL (ref 0.6–1.3)
EXT EGFR NO RACE VARIABLE: 93
EXT EOSINOPHIL%: 3 (ref 0–7)
EXT GLUCOSE: 102 (ref 74–109)
EXT HEMATOCRIT: 3.9 (ref 42–52)
EXT HEMOGLOBIN: 13.9 (ref 14–18)
EXT LYMPH%: 29 (ref 21–47)
EXT MAGNESIUM: 1 (ref 1.9–2.7)
EXT MONOCYTES%: 6 (ref 2–11)
EXT PHOSPHORUS: 1.7 (ref 2.5–5)
EXT PLATELETS: 216 (ref 150–375)
EXT POTASSIUM: 3.6 (ref 3.5–5.1)
EXT SEGS%: 60 (ref 44–81)
EXT SODIUM: 139 MMOL/L (ref 136–145)
EXT TACROLIMUS LVL: 18.9
EXT TACROLIMUS LVL: ABNORMAL
EXT WBC: 2.7 (ref 4–11)

## 2025-07-15 RX ORDER — LANOLIN ALCOHOL/MO/W.PET/CERES
800 CREAM (GRAM) TOPICAL NIGHTLY
Qty: 60 TABLET | Refills: 11 | Status: SHIPPED | OUTPATIENT
Start: 2025-07-15 | End: 2026-07-15

## 2025-07-15 RX ORDER — AMILORIDE HYDROCHLORIDE 5 MG/1
5 TABLET ORAL DAILY
Qty: 30 TABLET | Refills: 11 | Status: SHIPPED | OUTPATIENT
Start: 2025-07-15 | End: 2026-07-15

## 2025-07-15 NOTE — TELEPHONE ENCOUNTER
Contacted patient to inform him of tacro medication dose change. Instructed patient to HOLD tonight's dose of tacro and restart tomorrow morning at 5mg twice daily and repeat labs on Mon 7/21/25. Patient verbalized understanding.     My [facility] message sent to patient:    Ilya Murphy,     With regards to your prograf/tacrolimus dose change we discussed earlier. Dr. Tyler wants you to HOLD tonight's (7/15) dose of tacro and restart tomorrow morning prograf dose to 5mg twice daily and repeat labs on Mon 7/21/25. So don't worry about going to lab this Thursday to recheck your prograf level. Please use new lab I'm emailing you for Mon 7/21/25.     Kaylah Oakes     ----- Message from Gelacio Tyler MD sent at 7/15/2025  3:47 PM CDT -----  Please hold tonights dose and restart at 5/5 tomorrow morning and repeat tac trough in 1 week.  ----- Message -----  From: Kaylah Campos RN  Sent: 7/15/2025   3:33 PM CDT  To: Gelacio Tyler Jr., MD

## 2025-07-15 NOTE — TELEPHONE ENCOUNTER
----- Message from Gelacio Tyler MD sent at 7/15/2025  1:08 PM CDT -----  If he has palpitations or muscle weakness or shortness of breath he needs ED for IV electrolyte replacement. Increase magox to 800 nightly and kphos to 750 bid. And if systolic BP mostly >130 start   amiloride 5mg dialy for bp and low phos and mag. Other labs acceptable. Tac pending  ----- Message -----  From: Kaylah Campos RN  Sent: 7/15/2025  10:37 AM CDT  To: Gelacio Tyler Jr., MD     DISPLAY PLAN FREE TEXT

## 2025-07-15 NOTE — TELEPHONE ENCOUNTER
Notified patient of Dr. Tyler's review of 7/14/25 lab results. Inquired if patient's tacro level 18.9 is a true 12 hour level. Patient reports the level is true. Informed patient his tacro level will be decreased but will f/u with him once the lab is reviewed by Dr. Tyler.   Informed patient of low magnesium level 1.1  Patient reports muscle cramps in his feet; denies palpitations or muscle weakness. Instructed patient to report to nearest ER if he develops any of these symptoms.   Instructed patient to change his magnesium oxide to 800mg at night; increase K Phos Neutral to 750mg twice daily. Patient reports his B/Ps have been running > 130. Instructed patient to start amiloride 5mg daily for B/P and low phos and mag.  Patient verbalized understanding.     Informed patient a message will also be sent to him via [facility] since he's not home to record the medication changes to his blue medication card.     ----- Message from Gelacio Tyler MD sent at 7/15/2025  1:05 PM CDT -----  These labs are old now and his wbc has improved without changing meds. Tac is high and he will need reduced dose, but he got labs today and the tac is pending. I presume he will need to lower the   dose from 10/10 to 7/7. If today tac comes back in time we can wait for it, but I think it will take until tomorrow, at which point, we should just make his tac dose adjustment today. Again he needs to work on phos and mag supplementation.   ----- Message -----  From: Kaylah Campos RN  Sent: 7/15/2025  10:38 AM CDT  To: Gelacio Tyler Jr., MD    ----- Message from Gelacio Tyler MD sent at 7/15/2025  1:08 PM CDT -----  If he has palpitations or muscle weakness or shortness of breath he needs ED for IV electrolyte replacement. Increase magox to 800 nightly and kphos to 750 bid. And if systolic BP mostly >130 start   amiloride 5mg dialy for bp and low phos and mag. Other labs acceptable. Tac pending  ----- Message  -----  From: Kaylah Campos RN  Sent: 7/15/2025  10:37 AM CDT  To: Gelacio Tyler Jr., MD        oral

## 2025-07-16 RX ORDER — TACROLIMUS 1 MG/1
5 CAPSULE ORAL EVERY 12 HOURS
Qty: 300 CAPSULE | Refills: 11 | Status: SHIPPED | OUTPATIENT
Start: 2025-07-16 | End: 2026-07-16

## 2025-07-18 RX ORDER — SODIUM CHLORIDE 0.9 % (FLUSH) 0.9 %
10 SYRINGE (ML) INJECTION
OUTPATIENT
Start: 2025-07-21

## 2025-07-18 RX ORDER — HEPARIN 100 UNIT/ML
500 SYRINGE INTRAVENOUS
OUTPATIENT
Start: 2025-07-21

## 2025-07-21 ENCOUNTER — RESULTS FOLLOW-UP (OUTPATIENT)
Dept: TRANSPLANT | Facility: CLINIC | Age: 30
End: 2025-07-21

## 2025-07-21 ENCOUNTER — LAB VISIT (OUTPATIENT)
Dept: LAB | Facility: HOSPITAL | Age: 30
End: 2025-07-21
Attending: STUDENT IN AN ORGANIZED HEALTH CARE EDUCATION/TRAINING PROGRAM
Payer: MEDICARE

## 2025-07-21 ENCOUNTER — OFFICE VISIT (OUTPATIENT)
Dept: TRANSPLANT | Facility: CLINIC | Age: 30
End: 2025-07-21
Payer: MEDICARE

## 2025-07-21 DIAGNOSIS — Z29.89 NEED FOR PROPHYLACTIC IMMUNOTHERAPY: ICD-10-CM

## 2025-07-21 DIAGNOSIS — Z79.899 IMMUNOSUPPRESSIVE MANAGEMENT ENCOUNTER FOLLOWING KIDNEY TRANSPLANT: ICD-10-CM

## 2025-07-21 DIAGNOSIS — N18.2 STAGE 2 CHRONIC KIDNEY DISEASE: ICD-10-CM

## 2025-07-21 DIAGNOSIS — Z79.60 IMMUNODEFICIENCY DUE TO LONG TERM IMMUNOSUPPRESSIVE DRUG THERAPY: ICD-10-CM

## 2025-07-21 DIAGNOSIS — T86.10 COMPLICATION OF TRANSPLANTED KIDNEY, UNSPECIFIED COMPLICATION: ICD-10-CM

## 2025-07-21 DIAGNOSIS — D84.821 IMMUNODEFICIENCY DUE TO LONG TERM IMMUNOSUPPRESSIVE DRUG THERAPY: ICD-10-CM

## 2025-07-21 DIAGNOSIS — Z94.0 KIDNEY REPLACED BY TRANSPLANT: ICD-10-CM

## 2025-07-21 DIAGNOSIS — Z91.89 AT RISK FOR OPPORTUNISTIC INFECTIONS: ICD-10-CM

## 2025-07-21 DIAGNOSIS — Z94.0 S/P KIDNEY TRANSPLANT: ICD-10-CM

## 2025-07-21 DIAGNOSIS — B25.9 CYTOMEGALOVIRUS INFECTION, UNSPECIFIED CYTOMEGALOVIRAL INFECTION TYPE: ICD-10-CM

## 2025-07-21 DIAGNOSIS — Z91.89 INCREASED INFECTION RISK: ICD-10-CM

## 2025-07-21 DIAGNOSIS — T45.1X5A IMMUNODEFICIENCY DUE TO LONG TERM IMMUNOSUPPRESSIVE DRUG THERAPY: ICD-10-CM

## 2025-07-21 DIAGNOSIS — I10 HYPERTENSION: ICD-10-CM

## 2025-07-21 DIAGNOSIS — D63.8 ANEMIA OF CHRONIC DISEASE: ICD-10-CM

## 2025-07-21 DIAGNOSIS — I10 SEVERE UNCONTROLLED HYPERTENSION: Primary | ICD-10-CM

## 2025-07-21 DIAGNOSIS — N25.81 SECONDARY HYPERPARATHYROIDISM: ICD-10-CM

## 2025-07-21 DIAGNOSIS — Z94.0 IMMUNOSUPPRESSIVE MANAGEMENT ENCOUNTER FOLLOWING KIDNEY TRANSPLANT: ICD-10-CM

## 2025-07-21 LAB
ABSOLUTE EOSINOPHIL (OHS): 0.1 K/UL
ABSOLUTE MONOCYTE (OHS): 0.43 K/UL (ref 0.3–1)
ABSOLUTE NEUTROPHIL COUNT (OHS): 3 K/UL (ref 1.8–7.7)
ALBUMIN SERPL BCP-MCNC: 4 G/DL (ref 3.5–5.2)
ALP SERPL-CCNC: 149 UNIT/L (ref 40–150)
ALT SERPL W/O P-5'-P-CCNC: 41 UNIT/L (ref 10–44)
ANION GAP (OHS): 7 MMOL/L (ref 8–16)
AST SERPL-CCNC: 47 UNIT/L (ref 11–45)
BASOPHILS # BLD AUTO: 0.03 K/UL
BASOPHILS NFR BLD AUTO: 0.7 %
BILIRUB DIRECT SERPL-MCNC: 0.1 MG/DL (ref 0.1–0.3)
BILIRUB SERPL-MCNC: 0.2 MG/DL (ref 0.1–1)
BUN SERPL-MCNC: 14 MG/DL (ref 6–20)
CALCIUM SERPL-MCNC: 9.3 MG/DL (ref 8.7–10.5)
CHLORIDE SERPL-SCNC: 101 MMOL/L (ref 95–110)
CO2 SERPL-SCNC: 27 MMOL/L (ref 23–29)
CREAT SERPL-MCNC: 1.2 MG/DL (ref 0.5–1.4)
ERYTHROCYTE [DISTWIDTH] IN BLOOD BY AUTOMATED COUNT: 15.5 % (ref 11.5–14.5)
GFR SERPLBLD CREATININE-BSD FMLA CKD-EPI: >60 ML/MIN/1.73/M2
GLUCOSE SERPL-MCNC: 61 MG/DL (ref 70–110)
HCT VFR BLD AUTO: 37.8 % (ref 40–54)
HGB BLD-MCNC: 12.7 GM/DL (ref 14–18)
IMM GRANULOCYTES # BLD AUTO: 0.01 K/UL (ref 0–0.04)
IMM GRANULOCYTES NFR BLD AUTO: 0.2 % (ref 0–0.5)
LYMPHOCYTES # BLD AUTO: 0.78 K/UL (ref 1–4.8)
MAGNESIUM SERPL-MCNC: 1.4 MG/DL (ref 1.6–2.6)
MCH RBC QN AUTO: 28.6 PG (ref 27–31)
MCHC RBC AUTO-ENTMCNC: 33.6 G/DL (ref 32–36)
MCV RBC AUTO: 85 FL (ref 82–98)
NUCLEATED RBC (/100WBC) (OHS): 0 /100 WBC
PHOSPHATE SERPL-MCNC: 1.7 MG/DL (ref 2.7–4.5)
PLATELET # BLD AUTO: 273 K/UL (ref 150–450)
PMV BLD AUTO: 10.7 FL (ref 9.2–12.9)
POTASSIUM SERPL-SCNC: 3.1 MMOL/L (ref 3.5–5.1)
PROT SERPL-MCNC: 7.2 GM/DL (ref 6–8.4)
RBC # BLD AUTO: 4.44 M/UL (ref 4.6–6.2)
RELATIVE EOSINOPHIL (OHS): 2.3 %
RELATIVE LYMPHOCYTE (OHS): 17.9 % (ref 18–48)
RELATIVE MONOCYTE (OHS): 9.9 % (ref 4–15)
RELATIVE NEUTROPHIL (OHS): 69 % (ref 38–73)
SODIUM SERPL-SCNC: 135 MMOL/L (ref 136–145)
TACROLIMUS BLD-MCNC: 10.3 NG/ML (ref 5–15)
WBC # BLD AUTO: 4.35 K/UL (ref 3.9–12.7)

## 2025-07-21 PROCEDURE — 36415 COLL VENOUS BLD VENIPUNCTURE: CPT

## 2025-07-21 PROCEDURE — 1159F MED LIST DOCD IN RCRD: CPT | Mod: CPTII,95,, | Performed by: STUDENT IN AN ORGANIZED HEALTH CARE EDUCATION/TRAINING PROGRAM

## 2025-07-21 PROCEDURE — 80197 ASSAY OF TACROLIMUS: CPT

## 2025-07-21 PROCEDURE — 98007 SYNCH AUDIO-VIDEO EST HI 40: CPT | Mod: 95,,, | Performed by: STUDENT IN AN ORGANIZED HEALTH CARE EDUCATION/TRAINING PROGRAM

## 2025-07-21 PROCEDURE — 3066F NEPHROPATHY DOC TX: CPT | Mod: CPTII,95,, | Performed by: STUDENT IN AN ORGANIZED HEALTH CARE EDUCATION/TRAINING PROGRAM

## 2025-07-21 PROCEDURE — G2211 COMPLEX E/M VISIT ADD ON: HCPCS | Mod: 95,,, | Performed by: STUDENT IN AN ORGANIZED HEALTH CARE EDUCATION/TRAINING PROGRAM

## 2025-07-21 PROCEDURE — 82248 BILIRUBIN DIRECT: CPT

## 2025-07-21 PROCEDURE — 85025 COMPLETE CBC W/AUTO DIFF WBC: CPT

## 2025-07-21 PROCEDURE — 84100 ASSAY OF PHOSPHORUS: CPT

## 2025-07-21 PROCEDURE — 83735 ASSAY OF MAGNESIUM: CPT

## 2025-07-21 PROCEDURE — 3044F HG A1C LEVEL LT 7.0%: CPT | Mod: CPTII,95,, | Performed by: STUDENT IN AN ORGANIZED HEALTH CARE EDUCATION/TRAINING PROGRAM

## 2025-07-21 PROCEDURE — 1160F RVW MEDS BY RX/DR IN RCRD: CPT | Mod: CPTII,95,, | Performed by: STUDENT IN AN ORGANIZED HEALTH CARE EDUCATION/TRAINING PROGRAM

## 2025-07-21 PROCEDURE — 80053 COMPREHEN METABOLIC PANEL: CPT

## 2025-07-21 PROCEDURE — 87799 DETECT AGENT NOS DNA QUANT: CPT

## 2025-07-21 RX ORDER — EPLERENONE 25 MG/1
25 TABLET ORAL NIGHTLY
Qty: 30 TABLET | Refills: 11 | Status: SHIPPED | OUTPATIENT
Start: 2025-07-21 | End: 2026-07-21

## 2025-07-21 RX ORDER — TACROLIMUS 1 MG/1
4 CAPSULE ORAL EVERY 12 HOURS
Qty: 240 CAPSULE | Refills: 11 | Status: SHIPPED | OUTPATIENT
Start: 2025-07-21 | End: 2026-07-21

## 2025-07-21 NOTE — TELEPHONE ENCOUNTER
Patient repeated back and voice a understanding of orders.  High K, High Phos and High Mag diet reviewed.  ----- Message from Gelacio Tyler MD sent at 7/21/2025  2:20 PM CDT -----  Persistent hypokalemia, hypomagnesemia, hypophosphatemia despite oral supplementation and 1 week on amiloride. Added low dose eplerenone during visit today (plan to stop his hydralazine if his BP get   low). Did not get a chance to discuss his tac because it was not back yet. Please ask patient to decrease dose from 5/5 to 4/4 if it is a true tac trough.  ----- Message -----  From: Lab, Background User  Sent: 7/21/2025  10:00 AM CDT  To: Gelacio Tyler Jr., MD

## 2025-07-21 NOTE — PROGRESS NOTES
The patient location is: [place]  The chief complaint leading to consultation is: CKD and kidney transplant    Visit type: audiovisual    Face to Face time with patient: 17m 03s      I spent a total of 22 minutes on the day of the visit.    , which includes face to face time and non-face to face time preparing to see the patient (eg, review of tests), Obtaining and/or reviewing separately obtained history, Documenting clinical information in the electronic or other health record, Independently interpreting results (not separately reported) and communicating results to the patient/family/caregiver, or Care coordination (not separately reported).         Each patient to whom he or she provides medical services by telemedicine is:  (1) informed of the relationship between the physician and patient and the respective role of any other health care provider with respect to management of the patient; and (2) notified that he or she may decline to receive medical services by telemedicine and may withdraw from such care at any time.    Notes:                      Post-Transplant Assessment    Referring Physician: Raj Florez  Current Nephrologist: Raj Florez    ORGAN: LEFT KIDNEY  Donor Type: donation after brain death  PHS Increased Risk: no  Cold Ischemia: 724 mins  Induction Medications: thymo    Chief Complaint   Patient presents with    Chronic Renal Failure    Kidney Transplant Reassessment     Presents to the clinic today for medical conditions listed below.  Problem Noted   S/P Kidney Transplant 6/10/2025    ESRD HTN; HD->PD 12/2023  DBDKT 6/9/2025 KDPI 8 CIT 12 cPRA 30-88 DSA 0 induced thymo. OR not notable. 1 stent. 1 drain. 2 day lobato.      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 He  reports ongoing back pain that is improving with similar pain intensity as initial presentation. He notes stomach soreness associated with medication intake and denies current fevers. He has a history of kidney failure from high blood pressure at age 30. He was initially on hemodialysis and transitioned to peritoneal dialysis in 2023. He underwent a  donor kidney transplant on 2025, with a KDPI of 8, indicating an excellent quality kidney. The kidney failure at such a young age was unusual and likely related to more than just high blood pressure, though the exact etiology remains unclear. He reports variability in blood pressure readings, ranging from approximately 140/90 to other unspecified levels. He is currently on three blood pressure medications with a treatment goal to maintain blood pressure in the 120s to 130s range. Despite being on multiple antihypertensive medications, he has not developed kidney failure. He takes phosphorus supplements at night, consuming approximately eight phosphorus supplements nightly for the past three years with improved energy levels since starting this regimen. He is currently taking two magnesium supplements concurrently. He understands that his transplanted kidney is excreting phosphorus, magnesium, and potassium at high rates. He denies significant muscle weakness despite low phosphorus levels and reports feeling better overall with current supplementation.      ROS:  General: -fever, -fatigue  ENT: +voice change, +hoarseness  Gastrointestinal: +abdominal pain  Musculoskeletal: +back pain         Physical Exam :  There were no vitals filed for this visit.  Physical Exam    General: Well-appearing.  Lungs: Normal respiratory effort. No respiratory distress.          Last 3 sets of metabolic panel, blood count, drug levels reviewed; Most recent discharge reviewed; Most recent imaging of the kidneys reviewed and noted when available  1. Severe  uncontrolled hypertension    2. Stage 2 chronic kidney disease    3. S/P kidney transplant    4. At risk for opportunistic infections    5. Need for prophylactic immunotherapy    6. Immunodeficiency due to long term immunosuppressive drug therapy    7. Anemia of chronic disease    8. Secondary hyperparathyroidism      These problems pose a threat to patient's life or bodily function    Assessment & Plan    Z94.0 Kidney transplant status  N18.6 End stage renal disease  I10 Essential (primary) hypertension  E83.39 Other disorders of phosphorus metabolism  E83.42 Hypomagnesemia  M54.9 Dorsalgia, unspecified  Z99.2 Dependence on renal dialysis    PLAN SUMMARY:  - Started eplerenone 25 mg nightly for electrolyte reabsorption and BP control  - Continue phosphorus supplements, 3 tablets at night  - Continue magnesium supplements, 2 tablets at night  - Continue current BP medications (amiloride, hydralazine, carvedilol, nifedipine)  - Increase dietary intake of phosphorus-rich foods  - Contact office if new issues arise  - Follow-up expected in 3-6 months for medication and electrolyte balance adjustment    KIDNEY TRANSPLANT STATUS:  - Reviewed kidney transplant history, noting excellent KDPI score of 8 and absence of donor-specific antibodies at time of transplant.  - Monitored tacrolimus trough levels, aiming for target range of 7-10 ng/mL in first year post-transplant.  - Informed about expected adjustment period (3-6 months) post-transplant for medication and electrolyte balance.  - Assessed blood counts, noting normal WBC count, improved hemoglobin (12.7), and normal platelet count.    ELECTROLYTE IMBALANCES:  - Assessed electrolyte imbalances, particularly low phosphorus and magnesium levels, likely due to transplanted kidney's tendency to excrete these minerals.  - Explained importance of maintaining adequate phosphorus levels for muscle function and recommended increasing dietary intake of phosphorus-rich foods such  "as canned soups, yogurts, and nuts.  - Continued phosphorus supplements, take 3 tablets at night.  - Continued magnesium supplements, take 2 tablets at night.  - Started eplerenone 25 mg nightly to help kidney reabsorb potassium, magnesium, and phosphorus while potentially improving BP control.    HYPERTENSION:  - Evaluated BP management, noting current regimen includes multiple medications, with potential for future reduction as body adjusts to transplanted kidney.  - Continued current BP medications (amiloride, hydralazine, carvedilol, nifedipine).    BACK PAIN:  - Explained potential cause of back pain as result of positional changes and muscle imbalances following transplant surgery.    FOLLOW-UP:  - Contact the office if any new issues arise.         Immediate changes:    Prescribed eplerenone for blood pressure and low electrolytes. If systolic blood pressure starts to drop to be below 110, will stop hydralazine  Please obtain DSA and allosure with next labs    1. CKD stage 2:   Will continue follow up as per our center guidelines. patient to continue close follow up with the local General nephrologist. Education provided in appropriate fluid intake, potassium intake. Continue with oral hydration.    2. Immunosuppression:   Will closely monitor for toxicities, education provided about adherence to medicines and need to communicate any side effect to the transplant nurse or physician.  Lab Results   Component Value Date    TACROLIMUS 10.3 07/21/2025    TACROLIMUS 3.2 06/30/2025    TACROLIMUS 8.5 06/19/2025     No results found for: "CYCLOSPORINE"    3. Allograft Function:  Stable at baseline for the patient. Continue follow up as per our guidelines and with the local General nephrologist. Communication will be sent today.  Lab Results   Component Value Date    CREATININE 1.2 07/21/2025    CREATININE 1.1 07/14/2025    CREATININE 1.13 07/10/2025     Lab Results   Component Value Date    LIPASE 44 05/02/2024    " "LIPASE 89 (H) 04/28/2024    LIPASE 108 (H) 04/27/2024       4. Hypertension management:    Continue with home blood pressure monitoring, low salt and healthy life discussed with the patient    5. Metabolic Bone Disease/Secondary Hyperparathyroidism:   Calcium and phosphorus level discussed with the patient, patient will continue follow up with the general nephrologist for management of metabolic bone disease. Calcium and phosphorus as per our center protocol.  Lab Results   Component Value Date    .1 (H) 06/12/2025    CALCIUM 9.3 07/21/2025    PHOS 1.7 (L) 07/21/2025    PHOS 2.6 (L) 06/19/2025    PHOS 2.0 (L) 06/16/2025       6. Electrolytes:   Reviewed with the patient, essentially within the normal range no need for acute changes today, will monitor as per our center guidelines.  Lab Results   Component Value Date     (L) 07/21/2025    K 3.1 (L) 07/21/2025     07/21/2025    CO2 27 07/21/2025    CO2 29 07/14/2025    CO2 29 07/10/2025       7. Anemia:   Will continue monitoring as per our center guidelines. No indication for acute intervention today.  Lab Results   Component Value Date    WBC 4.35 07/21/2025    HGB 12.7 (L) 07/21/2025    HCT 37.8 (L) 07/21/2025    MCV 85 07/21/2025     07/21/2025       8.Proteinuria:   Will continue with pr/cr ratio as per our center guidelines  Lab Results   Component Value Date    PROTEINURINE 21 (H) 06/19/2025    CREATRANDUR 92.0 06/19/2025    UTPCR 0.23 (H) 06/19/2025    UTPCR 0.84 (H) 06/10/2025        9. BK virus infection screening:   Will continue with urine or blood PCR as per our guidelines to prevent BK virus viremia and allograft dysfunction  No results found for: "BKVIRUSDNAUR", "BKQUANTURINE", "BKVIRUSLOG", "BKVIRUSURINE"      10. Weight education:   Provided during the clinic visit.  There is no height or weight on file to calculate BMI.     11.Patient safety education regarding immunosuppression including prophylaxis posttransplant for CMV, " PCP :   Education provided about vaccination and prevention of infections.    12.  Cytopenias:   No significant cytopenias will monitor as per our guidelines. Medicine list reviewed including potential causes of drug-induced cytopenias  Lab Results   Component Value Date    WBC 4.35 07/21/2025    HGB 12.7 (L) 07/21/2025    HCT 37.8 (L) 07/21/2025    MCV 85 07/21/2025     07/21/2025       13. Post-transplant Prophylaxis: CMV Infection, PJP and Candida mucosistis and other indicated for this particular patient.     I spent a total of 22 minutes on the day of the visit.      No orders of the defined types were placed in this encounter.    There are no discontinued medications.   Future Appointments   Date Time Provider Department Center   7/25/2025 10:25 AM LABORATORY, [facility] [place]   8/11/2025  9:30 AM [facility] [place]       Follow-up:   Clinic: return to transplant clinic weekly for the first month after transplant; every 2 weeks during months 2-3; then at 6-, 9-, 12-, 18-, 24-, and 36- months post-transplant to reassess for complications from immunosuppression toxicity and monitor for rejection.  Annually thereafter.    Labs: since patient remains at high risk for rejection and drug-related complications that warrant close monitoring, labs will be ordered as follows: continue twice weekly CBC, renal panel, and drug level for first month; then same labs once weekly through 3rd month post-transplant.  Urine for UA and protein/creatinine ratio monthly.  Serum BK - PCR at 1-, 3-, 6-, 9-, 12-, 18-, 24-, 36- 48-, and 60 months post-transplant.  Hepatic panel at 1-, 2-, 3-, 6-, 9-, 12-, 18-, 24-, and 36- months post-transplant.    Gelacio Tyler Jr., MD       Education:   Material provided to the patient.  Patient reminded to call with any health changes since these can be early signs of significant complications.  Also, I advised the patient to be sure any new  medications or changes of old medications are discussed with either a pharmacist or physician knowledgeable with transplant to avoid rejection/drug toxicity related to significant drug interactions.    Patient advised that it is recommended that all transplanted patients, and their close contacts and household members receive Covid vaccination.    Visit today included increased complexity associated with the care of the episodic problem kidney transplant addressed and managing the longitudinal care of the patient due to the serious and/or complex managed problem(s) kidney transplant, CKD, HTN, secondary hyperparathyroidism, and anemia of chronic disease.    UNOS Patient Status  Functional Status: 80% - Normal activity with effort: some symptoms of disease  Physical Capacity: No Limitations

## 2025-07-21 NOTE — LETTER
July 21, 2025        Raj Florez  [address] [place] [address]  Phone: [phone]  Fax: [phone]             [facility]  [address]  [facility] [address]  Phone: [phone]   Patient: Jeffrey Moreno Jr.   MR Number: [id number]   YOB: 1995   Date of Visit: 7/21/2025       Dear Dr. Raj Florez    Thank you for referring Jeffrey Moreno to me for evaluation. Attached you will find relevant portions of my assessment and plan of care.    If you have questions, please do not hesitate to call me. I look forward to following Jeffrey Moreno along with you.    Sincerely,    Gelacio Tyler Jr., MD    Enclosure    If you would like to receive this communication electronically, please contact [email or web address] or [phone] to request [employer] access.    [employer] is a tool which provides read-only access to select patient information with whom you have a relationship. Its easy to use and provides real time access to review your patients record including encounter summaries, notes, results, and demographic information.    If you feel you have received this communication in error or would no longer like to receive these types of communications, please e-mail [email or web address]

## 2025-07-22 DIAGNOSIS — Z57.8 OCCUPATIONAL EXPOSURE TO OTHER RISK FACTORS: ICD-10-CM

## 2025-07-22 DIAGNOSIS — Z94.0 KIDNEY REPLACED BY TRANSPLANT: Primary | ICD-10-CM

## 2025-07-22 DIAGNOSIS — Z21 ASYMPTOMATIC HUMAN IMMUNODEFICIENCY VIRUS (HIV) INFECTION STATUS: ICD-10-CM

## 2025-07-22 DIAGNOSIS — Z11.4 SCREENING FOR HUMAN IMMUNODEFICIENCY VIRUS: ICD-10-CM

## 2025-07-22 DIAGNOSIS — Z91.89 PERSONAL HISTORY OF POISONING, PRESENTING HAZARDS TO HEALTH: ICD-10-CM

## 2025-07-22 LAB — CYTOMEGALOVIRUS DNA, QUAL (OHS): NOT DETECTED

## 2025-07-22 SDOH — SOCIAL DETERMINANTS OF HEALTH (SDOH): OCCUPATIONAL EXPOSURE TO OTHER RISK FACTORS: Z57.8

## 2025-07-23 ENCOUNTER — TELEPHONE (OUTPATIENT)
Dept: TRANSPLANT | Facility: CLINIC | Age: 30
End: 2025-07-23
Payer: MEDICARE

## 2025-07-23 LAB
W BK VIRUS DNA, QUALITATIVE, PLASMA: NOT DETECTED
W BK VIRUS DNA, QUANTITATIVE, PLASMA: <125 COPIES/ML
W LOG BK VIRUS DNA, PLASMA: <2.1 LOG (10) COPIES/ML

## 2025-07-23 NOTE — TELEPHONE ENCOUNTER
Phone call to patient, notified that protocol PHS labs not drawn on Monday 7/21.  Labs added to 7/25 hepatology labs.

## 2025-07-25 ENCOUNTER — TELEPHONE (OUTPATIENT)
Dept: TRANSPLANT | Facility: CLINIC | Age: 30
End: 2025-07-25
Payer: MEDICARE

## 2025-07-25 ENCOUNTER — LAB VISIT (OUTPATIENT)
Dept: LAB | Facility: HOSPITAL | Age: 30
End: 2025-07-25
Attending: NURSE PRACTITIONER
Payer: MEDICARE

## 2025-07-25 DIAGNOSIS — Z57.8 OCCUPATIONAL EXPOSURE TO OTHER RISK FACTORS: ICD-10-CM

## 2025-07-25 DIAGNOSIS — Z11.4 SCREENING FOR HUMAN IMMUNODEFICIENCY VIRUS: ICD-10-CM

## 2025-07-25 DIAGNOSIS — Z94.0 KIDNEY REPLACED BY TRANSPLANT: ICD-10-CM

## 2025-07-25 DIAGNOSIS — R93.2 ABNORMAL FINDINGS ON DIAGNOSTIC IMAGING OF LIVER: ICD-10-CM

## 2025-07-25 DIAGNOSIS — Z94.0 S/P KIDNEY TRANSPLANT: ICD-10-CM

## 2025-07-25 DIAGNOSIS — Z91.89 PERSONAL HISTORY OF POISONING, PRESENTING HAZARDS TO HEALTH: ICD-10-CM

## 2025-07-25 DIAGNOSIS — R74.8 ELEVATED LIVER ENZYMES: ICD-10-CM

## 2025-07-25 DIAGNOSIS — Z21 ASYMPTOMATIC HUMAN IMMUNODEFICIENCY VIRUS (HIV) INFECTION STATUS: ICD-10-CM

## 2025-07-25 LAB
ALBUMIN SERPL BCP-MCNC: 3.9 G/DL (ref 3.5–5.2)
ALP SERPL-CCNC: 136 UNIT/L (ref 40–150)
ALT SERPL W/O P-5'-P-CCNC: 47 UNIT/L (ref 0–55)
AST SERPL-CCNC: 44 UNIT/L (ref 0–50)
BILIRUB DIRECT SERPL-MCNC: 0.1 MG/DL (ref 0.1–0.3)
BILIRUB SERPL-MCNC: 0.3 MG/DL (ref 0.1–1)
PROT SERPL-MCNC: 7.1 GM/DL (ref 6–8.4)

## 2025-07-25 PROCEDURE — 87536 HIV-1 QUANT&REVRSE TRNSCRPJ: CPT

## 2025-07-25 PROCEDURE — 87517 HEPATITIS B DNA QUANT: CPT

## 2025-07-25 PROCEDURE — 36415 COLL VENOUS BLD VENIPUNCTURE: CPT | Mod: PO

## 2025-07-25 PROCEDURE — 87522 HEPATITIS C REVRS TRNSCRPJ: CPT

## 2025-07-25 PROCEDURE — 80076 HEPATIC FUNCTION PANEL: CPT

## 2025-07-25 SDOH — SOCIAL DETERMINANTS OF HEALTH (SDOH): OCCUPATIONAL EXPOSURE TO OTHER RISK FACTORS: Z57.8

## 2025-07-25 NOTE — TELEPHONE ENCOUNTER
Coordinator returned patient's mother phone call. Mother reports the new prescription eplerenone is not covered by the patient's insurance. The cost of medication for a 30 day supply is $28 which along with his other medications is costly for the patient. Mother states patient may not be able to continue taking this medication once this supply is complete. Informed mother Dr. Tyler will be notified. Mother verbalized understanding.

## 2025-07-28 ENCOUNTER — DOCUMENTATION ONLY (OUTPATIENT)
Dept: TRANSPLANT | Facility: CLINIC | Age: 30
End: 2025-07-28
Payer: MEDICARE

## 2025-07-28 LAB
EXT ALBUMIN: 4.5 (ref 3.5–5.7)
EXT ALKALINE PHOSPHATASE: 128 (ref 34–104)
EXT ALT: 35 (ref 7–52)
EXT ANC: ABNORMAL
EXT AST: 30 (ref 13–39)
EXT BACTERIA UA: ABNORMAL
EXT BILIRUBIN DIRECT: 0.1 MG/DL (ref 0–0.2)
EXT BILIRUBIN TOTAL: 0.3 (ref 0.3–1)
EXT BK VIRUS DNA QN PCR: ABNORMAL
EXT BUN: 25 (ref 7–245)
EXT CALCIUM: 10.2 (ref 8.6–10.3)
EXT CHLORIDE: 102 (ref 98–107)
EXT CMV DNA QUANT. BY PCR: ABNORMAL
EXT CO2: 29 (ref 21–31)
EXT CREATININE UA: 110.5
EXT CREATININE: 1.46 MG/DL
EXT EGFR NO RACE VARIABLE: 66
EXT EOSINOPHIL%: 3
EXT GLUCOSE UA: NEGATIVE
EXT GLUCOSE: 95 (ref 74–109)
EXT HCV AB: NEGATIVE
EXT HEMATOCRIT: 38.9 (ref 42–52)
EXT HEMOGLOBIN: 13.7 (ref 14–18)
EXT LYMPH%: 22
EXT MAGNESIUM: 1.8 (ref 1.9–2.7)
EXT MONOCYTES%: 7
EXT NITRITES UA: NEGATIVE
EXT PHOSPHORUS: 3.1 (ref 2.5–5)
EXT PLATELETS: 279 (ref 150–375)
EXT POTASSIUM: 4 (ref 3.5–5.1)
EXT PROT/CREAT RATIO UR: 0.16
EXT PROTEIN TOTAL: 6.9 (ref 6.4–8.9)
EXT PROTEIN UA: NEGATIVE
EXT RBC UA: ABNORMAL
EXT SEGS%: 67
EXT SODIUM: 136 MMOL/L (ref 136–145)
EXT TACROLIMUS LVL: 5.8
EXT URINE PROTEIN: 17.7
EXT WBC UA: ABNORMAL
EXT WBC: 3.2 (ref 4–11)
HBV DNA SERPL NAA+PROBE-ACNC: NORMAL [IU]/ML
HCV RNA SERPL NAA+PROBE-LOG IU: NOT DETECTED {LOG_IU}/ML
HIV1 RNA SERPL NAA+PROBE-LOG#: NOT DETECTED {LOG_COPIES}/ML

## 2025-08-07 ENCOUNTER — RESULTS FOLLOW-UP (OUTPATIENT)
Dept: TRANSPLANT | Facility: CLINIC | Age: 30
End: 2025-08-07
Payer: MEDICARE

## 2025-08-07 DIAGNOSIS — Z94.0 KIDNEY REPLACED BY TRANSPLANT: Primary | ICD-10-CM

## 2025-08-07 RX ORDER — TACROLIMUS 1 MG/1
4 CAPSULE ORAL EVERY 12 HOURS
Qty: 240 CAPSULE | Refills: 11 | Status: SHIPPED | OUTPATIENT
Start: 2025-08-07 | End: 2026-08-07

## 2025-08-07 NOTE — TELEPHONE ENCOUNTER
Notified patient of Dr. Tyler's review of 8/4/25 lab results via [facility].     Dr. Jayson Crump reviewed your 8/4/25 lab results. Your prograf level 13.4 is higher than it should be. He wants to decrease your prograf dose to 4mg twice daily.     Remember to have your labs drawn 12 hours from your PM dose of prograf. So if you still take your night medications at 8PM then have your labs drawn at 8AM.     We'll need to recheck your labs next Thursday 8/14/25. I'll email you a copy of the lab order that was faxed to [facility] and [facility].     Thanks,     Kaylah       ----- Message from Kaylah Campos sent at 8/7/2025 11:45 AM CDT -----    ----- Message -----  From: Gelacio Tyler Jr., MD  Sent: 8/7/2025  10:27 AM CDT  To: [facility] Post-Kidney Transplant Clinical    His med timing is off. Please re-educate on med and lab timing. If true tac trough, decrease dose from 5/4 back to 4/4 and repeat in 1 week. Leukopenia noted to have improved before with no change to   meds. Other labs acceptable.  ----- Message -----  From: Kaylah Campos RN  Sent: 8/6/2025   6:38 PM CDT  To: Gelacio Tyler Jr., MD

## 2025-08-08 DIAGNOSIS — E83.42 HYPOMAGNESEMIA: ICD-10-CM

## 2025-08-08 RX ORDER — LANOLIN ALCOHOL/MO/W.PET/CERES
800 CREAM (GRAM) TOPICAL NIGHTLY
Qty: 180 TABLET | Refills: 3 | Status: SHIPPED | OUTPATIENT
Start: 2025-08-08

## 2025-08-11 ENCOUNTER — OFFICE VISIT (OUTPATIENT)
Dept: TRANSPLANT | Facility: CLINIC | Age: 30
End: 2025-08-11
Payer: MEDICARE

## 2025-08-11 VITALS
TEMPERATURE: 97 F | DIASTOLIC BLOOD PRESSURE: 73 MMHG | RESPIRATION RATE: 18 BRPM | OXYGEN SATURATION: 98 % | WEIGHT: 157.19 LBS | BODY MASS INDEX: 20.17 KG/M2 | HEIGHT: 74 IN | SYSTOLIC BLOOD PRESSURE: 123 MMHG | HEART RATE: 88 BPM

## 2025-08-11 DIAGNOSIS — D63.8 ANEMIA OF CHRONIC DISEASE: ICD-10-CM

## 2025-08-11 DIAGNOSIS — N18.2 BENIGN HYPERTENSION WITH CKD (CHRONIC KIDNEY DISEASE), STAGE II: ICD-10-CM

## 2025-08-11 DIAGNOSIS — Z91.89 AT RISK FOR OPPORTUNISTIC INFECTIONS: ICD-10-CM

## 2025-08-11 DIAGNOSIS — I10 SEVERE UNCONTROLLED HYPERTENSION: ICD-10-CM

## 2025-08-11 DIAGNOSIS — Z29.89 NEED FOR PROPHYLACTIC IMMUNOTHERAPY: ICD-10-CM

## 2025-08-11 DIAGNOSIS — Z79.60 IMMUNODEFICIENCY DUE TO LONG TERM IMMUNOSUPPRESSIVE DRUG THERAPY: ICD-10-CM

## 2025-08-11 DIAGNOSIS — N18.2 STAGE 2 CHRONIC KIDNEY DISEASE: ICD-10-CM

## 2025-08-11 DIAGNOSIS — N25.81 SECONDARY HYPERPARATHYROIDISM: ICD-10-CM

## 2025-08-11 DIAGNOSIS — T45.1X5A IMMUNODEFICIENCY DUE TO LONG TERM IMMUNOSUPPRESSIVE DRUG THERAPY: ICD-10-CM

## 2025-08-11 DIAGNOSIS — I12.9 BENIGN HYPERTENSION WITH CKD (CHRONIC KIDNEY DISEASE), STAGE II: ICD-10-CM

## 2025-08-11 DIAGNOSIS — Z94.0 S/P KIDNEY TRANSPLANT: Primary | ICD-10-CM

## 2025-08-11 DIAGNOSIS — D84.821 IMMUNODEFICIENCY DUE TO LONG TERM IMMUNOSUPPRESSIVE DRUG THERAPY: ICD-10-CM

## 2025-08-11 DIAGNOSIS — N35.913 STRICTURE OF MEMBRANOUS URETHRA IN MALE, UNSPECIFIED STRICTURE TYPE: ICD-10-CM

## 2025-08-11 PROCEDURE — 3074F SYST BP LT 130 MM HG: CPT | Mod: CPTII,S$GLB,, | Performed by: STUDENT IN AN ORGANIZED HEALTH CARE EDUCATION/TRAINING PROGRAM

## 2025-08-11 PROCEDURE — 1159F MED LIST DOCD IN RCRD: CPT | Mod: CPTII,S$GLB,, | Performed by: STUDENT IN AN ORGANIZED HEALTH CARE EDUCATION/TRAINING PROGRAM

## 2025-08-11 PROCEDURE — 3008F BODY MASS INDEX DOCD: CPT | Mod: CPTII,S$GLB,, | Performed by: STUDENT IN AN ORGANIZED HEALTH CARE EDUCATION/TRAINING PROGRAM

## 2025-08-11 PROCEDURE — 99999 PR PBB SHADOW E&M-EST. PATIENT-LVL IV: CPT | Mod: PBBFAC,,, | Performed by: STUDENT IN AN ORGANIZED HEALTH CARE EDUCATION/TRAINING PROGRAM

## 2025-08-11 PROCEDURE — 99215 OFFICE O/P EST HI 40 MIN: CPT | Mod: S$GLB,,, | Performed by: STUDENT IN AN ORGANIZED HEALTH CARE EDUCATION/TRAINING PROGRAM

## 2025-08-11 PROCEDURE — 1160F RVW MEDS BY RX/DR IN RCRD: CPT | Mod: CPTII,S$GLB,, | Performed by: STUDENT IN AN ORGANIZED HEALTH CARE EDUCATION/TRAINING PROGRAM

## 2025-08-11 PROCEDURE — 3044F HG A1C LEVEL LT 7.0%: CPT | Mod: CPTII,S$GLB,, | Performed by: STUDENT IN AN ORGANIZED HEALTH CARE EDUCATION/TRAINING PROGRAM

## 2025-08-11 PROCEDURE — 3066F NEPHROPATHY DOC TX: CPT | Mod: CPTII,S$GLB,, | Performed by: STUDENT IN AN ORGANIZED HEALTH CARE EDUCATION/TRAINING PROGRAM

## 2025-08-11 PROCEDURE — G2211 COMPLEX E/M VISIT ADD ON: HCPCS | Mod: S$GLB,,, | Performed by: STUDENT IN AN ORGANIZED HEALTH CARE EDUCATION/TRAINING PROGRAM

## 2025-08-11 PROCEDURE — 3078F DIAST BP <80 MM HG: CPT | Mod: CPTII,S$GLB,, | Performed by: STUDENT IN AN ORGANIZED HEALTH CARE EDUCATION/TRAINING PROGRAM

## 2025-08-18 ENCOUNTER — DOCUMENTATION ONLY (OUTPATIENT)
Dept: TRANSPLANT | Facility: CLINIC | Age: 30
End: 2025-08-18
Payer: MEDICARE

## 2025-08-18 LAB
EXT ALBUMIN: 4.6 (ref 3.5–5.7)
EXT ALKALINE PHOSPHATASE: 140 (ref 34–104)
EXT ALT: 18 (ref 7–52)
EXT AST: 23 (ref 13–39)
EXT BILIRUBIN DIRECT: <0 MG/DL (ref 0–0.2)
EXT BILIRUBIN TOTAL: 0.2 (ref 0.3–1)
EXT BK VIRUS DNA QN PCR: NOT DETECTED
EXT BUN: 19 (ref 7–25)
EXT CALCIUM: 10.3 (ref 8.6–10.3)
EXT CHLORIDE: 102 (ref 98–107)
EXT CMV DNA QUANT. BY PCR: ABNORMAL
EXT CO2: 28 (ref 21–31)
EXT CREATININE UA: 68.8
EXT CREATININE: 1.49 MG/DL (ref 0.6–1.3)
EXT EGFR NO RACE VARIABLE: 65 (ref 61–120)
EXT EOSINOPHIL%: 2 (ref 0–7)
EXT GLUCOSE UA: ABNORMAL
EXT GLUCOSE: 131 (ref 74–109)
EXT HEMATOCRIT: 40.8 (ref 42–52)
EXT HEMOGLOBIN: 14.2 (ref 14–18)
EXT LYMPH%: 23 (ref 21–47)
EXT MAGNESIUM: 1.5 (ref 1.9–2.7)
EXT MONOCYTES%: 6 (ref 2–11)
EXT NITRITES UA: NEGATIVE
EXT PHOSPHORUS: 2.6 (ref 2.5–5)
EXT PLATELETS: 245 (ref 150–375)
EXT POTASSIUM: 4.1 (ref 3.5–5.1)
EXT PROT/CREAT RATIO UR: 0.17
EXT PROTEIN TOTAL: 7.3 (ref 6.4–8.9)
EXT PROTEIN UA: NEGATIVE
EXT SEGS%: 68 (ref 44–81)
EXT SODIUM: 136 MMOL/L (ref 136–145)
EXT TACROLIMUS LVL: 11.1
EXT URINE PROTEIN: 11.9 (ref 50–80)
EXT WBC UA: ABNORMAL
EXT WBC: 4.9 (ref 4–11)

## 2025-08-20 ENCOUNTER — RESULTS FOLLOW-UP (OUTPATIENT)
Dept: TRANSPLANT | Facility: HOSPITAL | Age: 30
End: 2025-08-20
Payer: MEDICARE

## 2025-08-20 DIAGNOSIS — T86.10 COMPLICATION OF TRANSPLANTED KIDNEY, UNSPECIFIED COMPLICATION: Primary | ICD-10-CM

## 2025-08-20 DIAGNOSIS — Z94.0 KIDNEY REPLACED BY TRANSPLANT: ICD-10-CM

## 2025-08-20 RX ORDER — TACROLIMUS 1 MG/1
CAPSULE ORAL
Qty: 210 CAPSULE | Refills: 11 | Status: SHIPPED | OUTPATIENT
Start: 2025-08-20 | End: 2026-08-20

## 2025-08-22 ENCOUNTER — HOSPITAL ENCOUNTER (OUTPATIENT)
Dept: RADIOLOGY | Facility: HOSPITAL | Age: 30
Discharge: HOME | End: 2025-08-22
Attending: STUDENT IN AN ORGANIZED HEALTH CARE EDUCATION/TRAINING PROGRAM
Payer: MEDICARE

## 2025-08-22 DIAGNOSIS — R74.8 ELEVATED LIVER ENZYMES: ICD-10-CM

## 2025-08-22 DIAGNOSIS — D63.8 ANEMIA OF CHRONIC DISEASE: ICD-10-CM

## 2025-08-22 DIAGNOSIS — T86.10 COMPLICATION OF TRANSPLANTED KIDNEY, UNSPECIFIED COMPLICATION: ICD-10-CM

## 2025-08-22 DIAGNOSIS — K75.81 NASH (NONALCOHOLIC STEATOHEPATITIS): Chronic | ICD-10-CM

## 2025-08-22 DIAGNOSIS — K76.0 HEPATIC STEATOSIS: Primary | ICD-10-CM

## 2025-08-22 PROCEDURE — 76776 US EXAM K TRANSPL W/DOPPLER: CPT | Mod: TC

## 2025-08-22 PROCEDURE — 76776 US EXAM K TRANSPL W/DOPPLER: CPT | Mod: 26,,, | Performed by: RADIOLOGY

## 2025-08-22 RX ORDER — LIDOCAINE HYDROCHLORIDE 10 MG/ML
1 INJECTION, SOLUTION EPIDURAL; INFILTRATION; INTRACAUDAL; PERINEURAL ONCE
Status: CANCELLED | OUTPATIENT
Start: 2025-08-22 | End: 2025-08-22

## 2025-08-25 ENCOUNTER — TELEPHONE (OUTPATIENT)
Dept: INTERVENTIONAL RADIOLOGY/VASCULAR | Facility: HOSPITAL | Age: 30
End: 2025-08-25
Payer: MEDICARE

## 2025-08-25 ENCOUNTER — RESULTS FOLLOW-UP (OUTPATIENT)
Dept: TRANSPLANT | Facility: CLINIC | Age: 30
End: 2025-08-25
Payer: MEDICARE

## 2025-08-25 DIAGNOSIS — T86.19 PERINEPHRIC FLUID COLLECTION OF KIDNEY TRANSPLANT: Primary | ICD-10-CM

## 2025-08-25 DIAGNOSIS — T86.10 COMPLICATION OF TRANSPLANTED KIDNEY, UNSPECIFIED COMPLICATION: ICD-10-CM

## 2025-08-25 DIAGNOSIS — N28.89 PERINEPHRIC FLUID COLLECTION OF KIDNEY TRANSPLANT: Primary | ICD-10-CM

## 2025-08-25 PROBLEM — R10.9 INTRACTABLE ABDOMINAL PAIN: Status: RESOLVED | Noted: 2024-04-29 | Resolved: 2025-08-25

## 2025-08-26 ENCOUNTER — HOSPITAL ENCOUNTER (OUTPATIENT)
Dept: INTERVENTIONAL RADIOLOGY/VASCULAR | Facility: HOSPITAL | Age: 30
Discharge: HOME | End: 2025-08-26
Attending: STUDENT IN AN ORGANIZED HEALTH CARE EDUCATION/TRAINING PROGRAM | Admitting: STUDENT IN AN ORGANIZED HEALTH CARE EDUCATION/TRAINING PROGRAM
Payer: MEDICARE

## 2025-08-26 VITALS
TEMPERATURE: 98 F | HEART RATE: 96 BPM | RESPIRATION RATE: 21 BRPM | HEIGHT: 74 IN | WEIGHT: 152 LBS | DIASTOLIC BLOOD PRESSURE: 77 MMHG | BODY MASS INDEX: 19.51 KG/M2 | OXYGEN SATURATION: 99 % | SYSTOLIC BLOOD PRESSURE: 117 MMHG

## 2025-08-26 DIAGNOSIS — N28.9 RENAL DYSFUNCTION: ICD-10-CM

## 2025-08-26 DIAGNOSIS — T86.10 COMPLICATION OF TRANSPLANTED KIDNEY, UNSPECIFIED COMPLICATION: ICD-10-CM

## 2025-08-26 PROCEDURE — 99153 MOD SED SAME PHYS/QHP EA: CPT | Performed by: STUDENT IN AN ORGANIZED HEALTH CARE EDUCATION/TRAINING PROGRAM

## 2025-08-26 PROCEDURE — 99152 MOD SED SAME PHYS/QHP 5/>YRS: CPT | Performed by: STUDENT IN AN ORGANIZED HEALTH CARE EDUCATION/TRAINING PROGRAM

## 2025-08-26 PROCEDURE — 88305 TISSUE EXAM BY PATHOLOGIST: CPT | Performed by: STUDENT IN AN ORGANIZED HEALTH CARE EDUCATION/TRAINING PROGRAM

## 2025-08-26 PROCEDURE — 25000003 PHARM REV CODE 250: Performed by: STUDENT IN AN ORGANIZED HEALTH CARE EDUCATION/TRAINING PROGRAM

## 2025-08-26 PROCEDURE — 63600175 PHARM REV CODE 636 W HCPCS: Performed by: STUDENT IN AN ORGANIZED HEALTH CARE EDUCATION/TRAINING PROGRAM

## 2025-08-26 RX ORDER — LIDOCAINE HYDROCHLORIDE 10 MG/ML
INJECTION, SOLUTION INFILTRATION; PERINEURAL
Status: COMPLETED | OUTPATIENT
Start: 2025-08-26 | End: 2025-08-26

## 2025-08-26 RX ORDER — MIDAZOLAM HYDROCHLORIDE 1 MG/ML
INJECTION, SOLUTION INTRAMUSCULAR; INTRAVENOUS
Status: COMPLETED | OUTPATIENT
Start: 2025-08-26 | End: 2025-08-26

## 2025-08-26 RX ORDER — FENTANYL CITRATE 50 UG/ML
INJECTION, SOLUTION INTRAMUSCULAR; INTRAVENOUS
Status: COMPLETED | OUTPATIENT
Start: 2025-08-26 | End: 2025-08-26

## 2025-08-26 RX ADMIN — FENTANYL CITRATE 50 MCG: 50 INJECTION INTRAMUSCULAR; INTRAVENOUS at 09:08

## 2025-08-26 RX ADMIN — MIDAZOLAM 2 MG: 1 INJECTION INTRAMUSCULAR; INTRAVENOUS at 09:08

## 2025-08-26 RX ADMIN — Medication 1 G: at 09:08

## 2025-08-26 RX ADMIN — LIDOCAINE HYDROCHLORIDE 5 ML: 10 INJECTION, SOLUTION INFILTRATION; PERINEURAL at 09:08

## 2025-08-27 ENCOUNTER — TELEPHONE (OUTPATIENT)
Dept: TRANSPLANT | Facility: HOSPITAL | Age: 30
End: 2025-08-27
Payer: MEDICARE

## 2025-08-29 LAB
ESTROGEN SERPL-MCNC: NORMAL PG/ML
INSULIN SERPL-ACNC: NORMAL U[IU]/ML
LAB AP CLINICAL INFORMATION: NORMAL
LAB AP GROSS DESCRIPTION: NORMAL
LAB AP PERFORMING LOCATION(S): NORMAL
LAB AP REPORT FOOTNOTES: NORMAL

## 2025-09-02 ENCOUNTER — RESULTS FOLLOW-UP (OUTPATIENT)
Dept: TRANSPLANT | Facility: CLINIC | Age: 30
End: 2025-09-02
Payer: MEDICARE

## 2025-09-02 DIAGNOSIS — I15.8 HYPERTENSION ASSOCIATED WITH TRANSPLANTATION: ICD-10-CM

## 2025-09-02 DIAGNOSIS — Z94.9 HYPERTENSION ASSOCIATED WITH TRANSPLANTATION: ICD-10-CM

## 2025-09-03 RX ORDER — AMILORIDE HYDROCHLORIDE 5 MG/1
5 TABLET ORAL DAILY
Qty: 90 TABLET | Refills: 3 | Status: SHIPPED | OUTPATIENT
Start: 2025-09-03

## 2025-09-03 RX ORDER — EPLERENONE 25 MG/1
25 TABLET ORAL NIGHTLY
Qty: 90 TABLET | Refills: 3 | Status: SHIPPED | OUTPATIENT
Start: 2025-09-03

## (undated) DEVICE — ELECTRODE MEGADYNE RETURN DUAL

## (undated) DEVICE — DRESSING MEPORE 3.6 X 10

## (undated) DEVICE — Device

## (undated) DEVICE — SYR ONLY LUER LOCK 20CC

## (undated) DEVICE — SUT ETHILON 3-0 PS2 18 BLK

## (undated) DEVICE — SUT SILK 2-0 STRANDS 30IN

## (undated) DEVICE — SUT SILK 3-0 STRANDS 30IN

## (undated) DEVICE — STAPLER SKIN PROXIMATE WIDE

## (undated) DEVICE — PACK KIDNEY TRANSPLANT CUSTOM

## (undated) DEVICE — SUT PDS BV 6-0

## (undated) DEVICE — SUT PROLENE 5-0 36IN C-1

## (undated) DEVICE — SUT PROLENE 6-0 BV-1 30IN

## (undated) DEVICE — CLIPPER BLADE MOD 4406 (CAREF)

## (undated) DEVICE — DRAIN CHANNEL ROUND 19FR

## (undated) DEVICE — TOWEL OR XRAY WHITE 17X26IN

## (undated) DEVICE — SUT 4-0 12-18IN SILK BLACK

## (undated) DEVICE — EVACUATOR WOUND BULB 100CC

## (undated) DEVICE — TRAY CATH 1-LYR URIMTR 16FR

## (undated) DEVICE — PUNCH AORTIC 4.8MM

## (undated) DEVICE — TIP YANKAUERS BULB NO VENT

## (undated) DEVICE — DRAPE SLUSH WARMER WITH DISC

## (undated) DEVICE — HEMOSTAT SURGICEL NU-KNIT 6X9

## (undated) DEVICE — DECANTER FLUID TRNSF WHITE 9IN

## (undated) DEVICE — SUT 2-0 12-18IN SILK

## (undated) DEVICE — SUT 1 36IN PDS II VIO MONO

## (undated) DEVICE — STOCKINETTE 2INX36

## (undated) DEVICE — PACK SET UP CONVERTORS

## (undated) DEVICE — ADHESIVE DERMABOND ADVANCED

## (undated) DEVICE — DRESSING ABSRBNT ISLAND 3.6X8

## (undated) DEVICE — SUT 3-0 12-18IN SILK